# Patient Record
Sex: MALE | Race: OTHER | HISPANIC OR LATINO | Employment: FULL TIME | URBAN - METROPOLITAN AREA
[De-identification: names, ages, dates, MRNs, and addresses within clinical notes are randomized per-mention and may not be internally consistent; named-entity substitution may affect disease eponyms.]

---

## 2017-04-02 ENCOUNTER — HOSPITAL ENCOUNTER (EMERGENCY)
Facility: HOSPITAL | Age: 22
Discharge: HOME | End: 2017-04-02
Admitting: EMERGENCY MEDICINE
Payer: COMMERCIAL

## 2017-04-02 VITALS
DIASTOLIC BLOOD PRESSURE: 68 MMHG | WEIGHT: 227.29 LBS | RESPIRATION RATE: 16 BRPM | HEART RATE: 84 BPM | SYSTOLIC BLOOD PRESSURE: 149 MMHG | OXYGEN SATURATION: 98 % | TEMPERATURE: 98.1 F

## 2017-04-02 DIAGNOSIS — H62.40 OTOMYCOSIS: ICD-10-CM

## 2017-04-02 DIAGNOSIS — H72.90: Primary | ICD-10-CM

## 2017-04-02 DIAGNOSIS — B36.9 OTOMYCOSIS: ICD-10-CM

## 2017-04-02 PROCEDURE — 99282 EMERGENCY DEPT VISIT SF MDM: CPT

## 2017-04-02 RX ORDER — ACETIC ACID 20.65 MG/ML
4 SOLUTION AURICULAR (OTIC) 3 TIMES DAILY
Qty: 50 ML | Refills: 0 | Status: SHIPPED | OUTPATIENT
Start: 2017-04-02 | End: 2018-01-16

## 2017-08-22 ENCOUNTER — HOSPITAL ENCOUNTER (EMERGENCY)
Facility: HOSPITAL | Age: 22
Discharge: HOME | End: 2017-08-22
Admitting: EMERGENCY MEDICINE
Payer: MEDICAID

## 2017-08-22 VITALS
RESPIRATION RATE: 15 BRPM | OXYGEN SATURATION: 98 % | DIASTOLIC BLOOD PRESSURE: 73 MMHG | TEMPERATURE: 98.2 F | SYSTOLIC BLOOD PRESSURE: 170 MMHG | HEART RATE: 85 BPM | WEIGHT: 234.13 LBS

## 2017-08-22 DIAGNOSIS — H66.93 BILATERAL OTITIS MEDIA: Primary | ICD-10-CM

## 2017-08-22 PROCEDURE — 99282 EMERGENCY DEPT VISIT SF MDM: CPT

## 2017-08-22 RX ORDER — OFLOXACIN 3 MG/ML
10 SOLUTION AURICULAR (OTIC) DAILY
Qty: 5 ML | Refills: 0 | Status: SHIPPED | OUTPATIENT
Start: 2017-08-22 | End: 2017-08-29

## 2018-01-16 ENCOUNTER — APPOINTMENT (EMERGENCY)
Dept: CT IMAGING | Facility: HOSPITAL | Age: 23
End: 2018-01-16
Payer: COMMERCIAL

## 2018-01-16 ENCOUNTER — HOSPITAL ENCOUNTER (EMERGENCY)
Facility: HOSPITAL | Age: 23
Discharge: HOME | End: 2018-01-16
Payer: COMMERCIAL

## 2018-01-16 VITALS
SYSTOLIC BLOOD PRESSURE: 130 MMHG | RESPIRATION RATE: 16 BRPM | HEART RATE: 76 BPM | TEMPERATURE: 97.8 F | OXYGEN SATURATION: 100 % | DIASTOLIC BLOOD PRESSURE: 73 MMHG | WEIGHT: 230 LBS

## 2018-01-16 DIAGNOSIS — R11.0 NAUSEA: ICD-10-CM

## 2018-01-16 DIAGNOSIS — R10.13 EPIGASTRIC ABDOMINAL PAIN: ICD-10-CM

## 2018-01-16 DIAGNOSIS — M54.50 LOW BACK PAIN: Primary | ICD-10-CM

## 2018-01-16 LAB
ALBUMIN SERPL BCP-MCNC: 4.2 G/DL (ref 3.5–5)
ALP SERPL-CCNC: 57 U/L (ref 46–116)
ALT SERPL W P-5'-P-CCNC: 32 U/L (ref 12–78)
ANION GAP SERPL CALCULATED.3IONS-SCNC: 6 MMOL/L (ref 4–13)
AST SERPL W P-5'-P-CCNC: 22 U/L (ref 5–45)
BASOPHILS # BLD AUTO: 0.02 THOUSANDS/ΜL (ref 0–0.1)
BASOPHILS NFR BLD AUTO: 0 % (ref 0–1)
BILIRUB SERPL-MCNC: 0.49 MG/DL (ref 0.2–1)
BILIRUB UR QL STRIP: NEGATIVE
BUN SERPL-MCNC: 17 MG/DL (ref 5–25)
CALCIUM SERPL-MCNC: 9.5 MG/DL (ref 8.3–10.1)
CHLORIDE SERPL-SCNC: 102 MMOL/L (ref 100–108)
CLARITY UR: CLEAR
CO2 SERPL-SCNC: 31 MMOL/L (ref 21–32)
COLOR UR: YELLOW
COLOR, POC: YELLOW
CREAT SERPL-MCNC: 1 MG/DL (ref 0.6–1.3)
EOSINOPHIL # BLD AUTO: 0.38 THOUSAND/ΜL (ref 0–0.61)
EOSINOPHIL NFR BLD AUTO: 5 % (ref 0–6)
ERYTHROCYTE [DISTWIDTH] IN BLOOD BY AUTOMATED COUNT: 13 % (ref 11.6–15.1)
GFR SERPL CREATININE-BSD FRML MDRD: 107 ML/MIN/1.73SQ M
GLUCOSE SERPL-MCNC: 94 MG/DL (ref 65–140)
GLUCOSE UR STRIP-MCNC: NEGATIVE MG/DL
HCT VFR BLD AUTO: 52.1 % (ref 36.5–49.3)
HGB BLD-MCNC: 17 G/DL (ref 12–17)
HGB UR QL STRIP.AUTO: NEGATIVE
KETONES UR STRIP-MCNC: NEGATIVE MG/DL
LEUKOCYTE ESTERASE UR QL STRIP: NEGATIVE
LIPASE SERPL-CCNC: 199 U/L (ref 73–393)
LYMPHOCYTES # BLD AUTO: 2.2 THOUSANDS/ΜL (ref 0.6–4.47)
LYMPHOCYTES NFR BLD AUTO: 31 % (ref 14–44)
MCH RBC QN AUTO: 31.3 PG (ref 26.8–34.3)
MCHC RBC AUTO-ENTMCNC: 32.6 G/DL (ref 31.4–37.4)
MCV RBC AUTO: 96 FL (ref 82–98)
MONOCYTES # BLD AUTO: 0.37 THOUSAND/ΜL (ref 0.17–1.22)
MONOCYTES NFR BLD AUTO: 5 % (ref 4–12)
NEUTROPHILS # BLD AUTO: 4.08 THOUSANDS/ΜL (ref 1.85–7.62)
NEUTS SEG NFR BLD AUTO: 59 % (ref 43–75)
NITRITE UR QL STRIP: NEGATIVE
NRBC BLD AUTO-RTO: 0 /100 WBCS
PH UR STRIP.AUTO: 7 [PH] (ref 4.5–8)
PLATELET # BLD AUTO: 211 THOUSANDS/UL (ref 149–390)
PMV BLD AUTO: 11.6 FL (ref 8.9–12.7)
POTASSIUM SERPL-SCNC: 4.2 MMOL/L (ref 3.5–5.3)
PROT SERPL-MCNC: 8.3 G/DL (ref 6.4–8.2)
PROT UR STRIP-MCNC: NEGATIVE MG/DL
RBC # BLD AUTO: 5.44 MILLION/UL (ref 3.88–5.62)
SODIUM SERPL-SCNC: 139 MMOL/L (ref 136–145)
SP GR UR STRIP.AUTO: 1.02 (ref 1–1.03)
UROBILINOGEN UR QL STRIP.AUTO: 0.2 E.U./DL
WBC # BLD AUTO: 7.05 THOUSAND/UL (ref 4.31–10.16)

## 2018-01-16 PROCEDURE — 80053 COMPREHEN METABOLIC PANEL: CPT

## 2018-01-16 PROCEDURE — 74177 CT ABD & PELVIS W/CONTRAST: CPT

## 2018-01-16 PROCEDURE — 83690 ASSAY OF LIPASE: CPT

## 2018-01-16 PROCEDURE — 85025 COMPLETE CBC W/AUTO DIFF WBC: CPT

## 2018-01-16 PROCEDURE — 81002 URINALYSIS NONAUTO W/O SCOPE: CPT

## 2018-01-16 PROCEDURE — 87491 CHLMYD TRACH DNA AMP PROBE: CPT | Performed by: PHYSICIAN ASSISTANT

## 2018-01-16 PROCEDURE — 96374 THER/PROPH/DIAG INJ IV PUSH: CPT

## 2018-01-16 PROCEDURE — 81003 URINALYSIS AUTO W/O SCOPE: CPT

## 2018-01-16 PROCEDURE — 87591 N.GONORRHOEAE DNA AMP PROB: CPT | Performed by: PHYSICIAN ASSISTANT

## 2018-01-16 PROCEDURE — 99284 EMERGENCY DEPT VISIT MOD MDM: CPT

## 2018-01-16 PROCEDURE — 36415 COLL VENOUS BLD VENIPUNCTURE: CPT

## 2018-01-16 RX ORDER — ONDANSETRON 4 MG/1
4 TABLET, ORALLY DISINTEGRATING ORAL EVERY 8 HOURS PRN
Qty: 12 TABLET | Refills: 0 | Status: SHIPPED | OUTPATIENT
Start: 2018-01-16 | End: 2019-03-05 | Stop reason: ALTCHOICE

## 2018-01-16 RX ORDER — CYCLOBENZAPRINE HCL 5 MG
5 TABLET ORAL 3 TIMES DAILY PRN
Qty: 10 TABLET | Refills: 0 | Status: SHIPPED | OUTPATIENT
Start: 2018-01-16 | End: 2019-03-05 | Stop reason: ALTCHOICE

## 2018-01-16 RX ORDER — DIAZEPAM 5 MG/ML
5 INJECTION, SOLUTION INTRAMUSCULAR; INTRAVENOUS ONCE
Status: COMPLETED | OUTPATIENT
Start: 2018-01-16 | End: 2018-01-16

## 2018-01-16 RX ADMIN — IOHEXOL 100 ML: 350 INJECTION, SOLUTION INTRAVENOUS at 17:54

## 2018-01-16 RX ADMIN — DIAZEPAM 5 MG: 5 INJECTION, SOLUTION INTRAMUSCULAR; INTRAVENOUS at 17:15

## 2018-01-16 NOTE — DISCHARGE INSTRUCTIONS
Acute Nausea and Vomiting, Ambulatory Care   GENERAL INFORMATION:   Acute nausea and vomiting  starts suddenly, gets worse quickly, and lasts a short time  Nausea and vomiting may be caused by pregnancy, alcohol, infection, or medicines  Common related symptoms include the following:   · Fever    · Abdominal swelling    · Pain, tenderness, or a lump in the abdomen    · Splashing sounds heard in your stomach when you move  Seek immediate care for the following symptoms:   · Blood in your vomit or bowel movements    · Sudden, severe pain in your chest and upper abdomen after hard vomiting    · Dizziness, dry mouth, and thirst    · Urinating very little or not at all    · Muscle weakness, leg cramps, and trouble breathing    · A heart beat that is faster than normal    · Vomiting for more than 48 hours  Treatment for acute nausea and vomiting  may include medicines to calm your stomach and stop the vomiting  You may need IV fluids if you are dehydrated  Manage your nausea and vomiting:   · Drink liquids as directed to prevent dehydration  Ask how much liquid to drink each day and which liquids are best for you  You may need to drink an oral rehydration solution (ORS)  ORS contains water, salts, and sugar that are needed to replace the lost body fluids  Ask what kind of ORS to use, how much to drink, and where to get it  · Eat smaller meals, more often  Eat small amounts of food every 2 to 3 hours, even if you are not hungry  Food in your stomach may help decrease your nausea  · Avoid stress  Find ways to relax and manage your stress  Headaches due to stress may cause nausea and vomiting  Get more rest and sleep  Follow up with your healthcare provider as directed:  Write down your questions so you remember to ask them during your visits  CARE AGREEMENT:   You have the right to help plan your care  Learn about your health condition and how it may be treated   Discuss treatment options with your caregivers to decide what care you want to receive  You always have the right to refuse treatment  The above information is an  only  It is not intended as medical advice for individual conditions or treatments  Talk to your doctor, nurse or pharmacist before following any medical regimen to see if it is safe and effective for you  © 2014 [address] is for End User's use only and may not be sold, redistributed or otherwise used for commercial purposes  All illustrations and images included in CareNotes® are the copyrighted property of [employer]  or Godwin Guzmanuss  Abdominal Pain   WHAT YOU NEED TO KNOW:   Abdominal pain can be dull, achy, or sharp  You may have pain in one area of your abdomen, or in your entire abdomen  Your pain may be caused by a condition such as constipation, food sensitivity or poisoning, infection, or a blockage  Abdominal pain can also be from a hernia, appendicitis, or an ulcer  Liver, gallbladder, or kidney conditions can also cause abdominal pain  The cause of your abdominal pain may be unknown  DISCHARGE INSTRUCTIONS:   Return to the emergency department if:   · You have new chest pain or shortness of breath  · You have pulsing pain in your upper abdomen or lower back that suddenly becomes constant  · Your pain is in the right lower abdominal area and worsens with movement  · You have a fever over 100 4°F (38°C) or shaking chills  · You are vomiting and cannot keep food or liquids down  · Your pain does not improve or gets worse over the next 8 to 12 hours  · You see blood in your vomit or bowel movements, or they look black and tarry  · Your skin or the whites of your eyes turn yellow  · You are a woman and have a large amount of vaginal bleeding that is not your monthly period  Contact your healthcare provider if:   · You have pain in your lower back       · You are a man and have pain in your testicles  · You have pain when you urinate  · You have questions or concerns about your condition or care  Follow up with your healthcare provider within 24 hours or as directed:  Write down your questions so you remember to ask them during your visits  Medicines:   · Medicines  may be given to calm your stomach and prevent vomiting or to decrease pain  Ask how to take pain medicine safely  · Take your medicine as directed  Contact your healthcare provider if you think your medicine is not helping or if you have side effects  Tell him of her if you are allergic to any medicine  Keep a list of the medicines, vitamins, and herbs you take  Include the amounts, and when and why you take them  Bring the list or the pill bottles to follow-up visits  Carry your medicine list with you in case of an emergency  © 2017 [address] Information is for End User's use only and may not be sold, redistributed or otherwise used for commercial purposes  All illustrations and images included in CareNotes® are the copyrighted property of [employer] D A M [employer] Inc  or Godwin Lopez  The above information is an  only  It is not intended as medical advice for individual conditions or treatments  Talk to your doctor, nurse or pharmacist before following any medical regimen to see if it is safe and effective for you  Acute Low Back Pain   WHAT YOU NEED TO KNOW:   Acute low back pain is sudden discomfort in your lower back area that lasts for up to 6 weeks  The discomfort makes it difficult to tolerate activity  DISCHARGE INSTRUCTIONS:   Return to the emergency department if:   · You have severe pain  · You have sudden stiffness and heaviness on both buttocks down to both legs  · You have numbness or weakness in one leg, or pain in both legs  · You have numbness in your genital area or across your lower back  · You cannot control your urine or bowel movements    Contact your healthcare provider if:   · You have a fever  · You have pain at night or when you rest     · Your pain does not get better with treatment  · You have pain that worsens when you cough or sneeze  · You suddenly feel something pop or snap in your back  · You have questions or concerns about your condition or care  Medicines: The following medicines may be ordered by your healthcare provider:  · Acetaminophen  decreases pain  It is available without a doctor's order  Ask how much to take and how often to take it  Follow directions  Acetaminophen can cause liver damage if not taken correctly  · NSAIDs  help decrease swelling and pain  This medicine is available with or without a doctor's order  NSAIDs can cause stomach bleeding or kidney problems in certain people  If you take blood thinner medicine, always ask your healthcare provider if NSAIDs are safe for you  Always read the medicine label and follow directions  · Prescription pain medicine  may be given  Ask your healthcare provider how to take this medicine safely  · Muscle relaxers  decrease pain by relaxing the muscles in your lower spine  · Take your medicine as directed  Contact your healthcare provider if you think your medicine is not helping or if you have side effects  Tell him of her if you are allergic to any medicine  Keep a list of the medicines, vitamins, and herbs you take  Include the amounts, and when and why you take them  Bring the list or the pill bottles to follow-up visits  Carry your medicine list with you in case of an emergency  Self-care:   · Stay active  as much as you can without causing more pain  Bed rest could make your back pain worse  Start with some light exercises such as walking  Avoid heavy lifting until your pain is gone  Ask for more information about the activities or exercises that are right for you  · Ice  helps decrease swelling, pain, and muscle spams  Put crushed ice in a plastic bag  Cover it with a towel  Place it on your lower back for 20 to 30 minutes every 2 hours  Do this for about 2 to 3 days after your pain starts, or as directed  · Heat  helps decrease pain and muscle spasms  Start to use heat after treatment with ice has stopped  Use a small towel dampened with warm water or a heating pad, or sit in a warm bath  Apply heat on the area for 20 to 30 minutes every 2 hours for as many days as directed  Alternate heat and ice  Prevent acute low back pain:   · Use proper body mechanics  ¨ Bend at the hips and knees when you  objects  Do not bend from the waist  Use your leg muscles as you lift the load  Do not use your back  Keep the object close to your chest as you lift it  Try not to twist or lift anything above your waist     ¨ Change your position often when you stand for long periods of time  Rest one foot on a small box or footrest, and then switch to the other foot often  ¨ Try not to sit for long periods of time  When you do, sit in a straight-backed chair with your feet flat on the floor  Never reach, pull, or push while you are sitting  · Do exercises that strengthen your back muscles  Warm up before you exercise  Ask your healthcare provider the best exercises for you  · Maintain a healthy weight  Ask your healthcare provider how much you should weigh  Ask him to help you create a weight loss plan if you are overweight  Follow up with your healthcare provider as directed:  Return for a follow-up visit if you still have pain after 1 to 3 weeks of treatment  You may need to visit an orthopedist if your back pain lasts more than 12 weeks  Write down your questions so you remember to ask them during your visits  © 2017 [facility] Information is for End User's use only and may not be sold, redistributed or otherwise used for commercial purposes   All illustrations and images included in CareNotes® are the copyrighted property of FREDDY FAROOQ Andrea  or Godwin Lopez  The above information is an  only  It is not intended as medical advice for individual conditions or treatments  Talk to your doctor, nurse or pharmacist before following any medical regimen to see if it is safe and effective for you  DISCHARGE INSTRUCTIONS:    FOLLOW UP WITH YOUR PRIMARY CARE PROVIDER OR THE [address] [place] [address]  MAKE AN APPOINTMENT TO BE SEEN  TAKE TYLENOL FOR PAIN  TAKE FLEXERIL AS PRESCRIBED FOR MUSCLE STRAIN  IF RASH, SHORTNESS OF BREATH OR TROUBLE SWALLOWING, STOP TAKING THE MEDICATION AND BE SEEN  NO DRINKING, DRIVING OR OPERATING HEAVY MACHINERY WHILE TAKING THIS MEDICATION  TAKE ZOFRAN FOR NAUSEA  IF RASH, SHORTNESS OF BREATH OR TROUBLE SWALLOWING, STOP TAKING THE MEDICATION AND BE SEEN  REST AND DRINK PLENTY OF FLUIDS  FOLLOW UP WITH THE RECOMMENDED GASTROENTEROLOGIST  FOLLOW A BLAND DIET  APPLY ICE AND HEAT TO BACK WHERE HAVING PAIN  IF SYMPTOMS WORSEN OR NEW SYMPTOMS ARISE, RETURN TO THE ER TO BE SEEN

## 2018-01-16 NOTE — ED PROVIDER NOTES
History  Chief Complaint   Patient presents with    Abdominal Pain     abdomianl pain x 2 weeks with nausea  Pt deneis fevers/vomiting  Pt states when he gets hungry hisstomach hurts more  Pt also c/o burnign with urination amdb/l lower back pain      22y  o male with no significant PMH presents to the ER for left low back pain and epigastric abdominal pain for 1-2 weeks  Patient denies taking any medication for his symptoms  He describes his pain as sharp and non-radiating  He rates his pain 8/10 and states his abdominal pain comes and goes when he is hungry and his back pain comes and goes with movement  Associated symptoms: diarrhea, nausea and dysuria  He denies injury to the area or heavy lifting  He denies fever, chills, chest pain, dyspnea, vomiting, hematuria, frequency, urgency, bowel/bladder incontinence, weakness or paresthesias  History provided by:  Patient   used: No        None       History reviewed  No pertinent past medical history  History reviewed  No pertinent surgical history  History reviewed  No pertinent family history  I have reviewed and agree with the history as documented  Social History   Substance Use Topics    Smoking status: Never Smoker    Smokeless tobacco: Never Used    Alcohol use Yes      Comment: occasionally        Review of Systems   Constitutional: Negative for chills and fever  Eyes: Negative for photophobia and visual disturbance  Respiratory: Negative for shortness of breath  Cardiovascular: Negative for chest pain  Gastrointestinal: Positive for abdominal pain, diarrhea and nausea  Negative for vomiting  Genitourinary: Positive for dysuria  Negative for frequency, hematuria and urgency  Musculoskeletal: Positive for back pain  Negative for neck pain and neck stiffness  Skin: Negative for rash  Allergic/Immunologic: Negative for food allergies  Neurological: Negative for weakness, numbness and headaches  Physical Exam  ED Triage Vitals [01/16/18 1331]   Temperature Pulse Respirations Blood Pressure SpO2   99 °F (37 2 °C) 83 16 141/62 100 %      Temp Source Heart Rate Source Patient Position - Orthostatic VS BP Location FiO2 (%)   Oral Monitor Sitting Right arm --      Pain Score       5           Orthostatic Vital Signs  Vitals:    01/16/18 1331 01/16/18 1642 01/16/18 1814   BP: 141/62 142/63 130/73   Pulse: 83 77 76   Patient Position - Orthostatic VS: Sitting  Sitting       Physical Exam   Constitutional:  Non-toxic appearance  No distress  HENT:   Head: Normocephalic and atraumatic  Right Ear: Tympanic membrane, external ear and ear canal normal  No drainage, swelling or tenderness  No foreign bodies  Tympanic membrane is not erythematous  No hemotympanum  Left Ear: Tympanic membrane, external ear and ear canal normal  No drainage, swelling or tenderness  No foreign bodies  Tympanic membrane is not erythematous  No hemotympanum  Nose: Nose normal    Mouth/Throat: Uvula is midline, oropharynx is clear and moist and mucous membranes are normal  No uvula swelling  No posterior oropharyngeal edema, posterior oropharyngeal erythema or tonsillar abscesses  No tonsillar exudate  Eyes: Conjunctivae and EOM are normal  Pupils are equal, round, and reactive to light  Neck: Normal range of motion and phonation normal  Neck supple  No tracheal deviation present  Cardiovascular: Normal rate, regular rhythm, S1 normal, S2 normal and normal heart sounds  Exam reveals no gallop and no friction rub  No murmur heard  Pulmonary/Chest: Effort normal and breath sounds normal  No respiratory distress  He has no decreased breath sounds  He has no wheezes  He has no rhonchi  He has no rales  He exhibits no tenderness  Abdominal: Soft  Bowel sounds are normal  He exhibits no distension  There is tenderness in the epigastric area  There is no rebound and no guarding  Musculoskeletal: Normal range of motion   He exhibits no edema or deformity  Cervical back: Normal         Thoracic back: Normal         Lumbar back: He exhibits tenderness (mild) and pain  He exhibits normal range of motion, no bony tenderness, no swelling, no edema and no deformity  Back:    Neurological: He is alert  He has normal strength  No cranial nerve deficit or sensory deficit  He exhibits normal muscle tone  Gait normal  GCS eye subscore is 4  GCS verbal subscore is 5  GCS motor subscore is 6  Skin: Skin is warm and dry  No rash noted  Psychiatric: He has a normal mood and affect  Nursing note and vitals reviewed  ED Medications  Medications   diazepam (VALIUM) injection 5 mg (5 mg Intravenous Given 1/16/18 1715)   iohexol (OMNIPAQUE) 350 MG/ML injection (MULTI-DOSE) 100 mL (100 mL Intravenous Given 1/16/18 1754)       Diagnostic Studies  Results Reviewed     Procedure Component Value Units Date/Time    Chlamydia/GC amplified DNA by PCR [[id number]] Collected:  01/16/18 1719    Lab Status: In process Specimen:  Urine from Urine, Other Updated:  01/16/18 1722    Comprehensive metabolic panel [[id number]]  (Abnormal) Collected:  01/16/18 1527    Lab Status:  Final result Specimen:  Blood from Arm, Right Updated:  01/16/18 1608     Sodium 139 mmol/L      Potassium 4 2 mmol/L      Chloride 102 mmol/L      CO2 31 mmol/L      Anion Gap 6 mmol/L      BUN 17 mg/dL      Creatinine 1 00 mg/dL      Glucose 94 mg/dL      Calcium 9 5 mg/dL      AST 22 U/L      ALT 32 U/L      Alkaline Phosphatase 57 U/L      Total Protein 8 3 (H) g/dL      Albumin 4 2 g/dL      Total Bilirubin 0 49 mg/dL      eGFR 107 ml/min/1 73sq m     Narrative:         National Kidney Disease Education Program recommendations are as follows:  GFR calculation is accurate only with a steady state creatinine  Chronic Kidney disease less than 60 ml/min/1 73 sq  meters  Kidney failure less than 15 ml/min/1 73 sq  meters      Lipase [[id number]]  (Normal) Collected:  01/16/18 1527    Lab Status:  Final result Specimen:  Blood from Arm, Right Updated:  01/16/18 1608     Lipase 199 u/L     CBC and differential [[id number]]  (Abnormal) Collected:  01/16/18 1527    Lab Status:  Final result Specimen:  Blood from Arm, Right Updated:  01/16/18 1538     WBC 7 05 Thousand/uL      RBC 5 44 Million/uL      Hemoglobin 17 0 g/dL      Hematocrit 52 1 (H) %      MCV 96 fL      MCH 31 3 pg      MCHC 32 6 g/dL      RDW 13 0 %      MPV 11 6 fL      Platelets 189 Thousands/uL      nRBC 0 /100 WBCs      Neutrophils Relative 59 %      Lymphocytes Relative 31 %      Monocytes Relative 5 %      Eosinophils Relative 5 %      Basophils Relative 0 %      Neutrophils Absolute 4 08 Thousands/µL      Lymphocytes Absolute 2 20 Thousands/µL      Monocytes Absolute 0 37 Thousand/µL      Eosinophils Absolute 0 38 Thousand/µL      Basophils Absolute 0 02 Thousands/µL     POCT urinalysis dipstick [[id number]]  (Normal) Resulted:  01/16/18 1536    Lab Status:  Final result Specimen:  Urine from Urine, Other Updated:  01/16/18 1536     Color, UA yellow    ED Urine Macroscopic [[id number]]  (Normal) Collected:  01/16/18 1533    Lab Status:  Final result Specimen:  Urine Updated:  01/16/18 1534     Color, UA Yellow     Clarity, UA Clear     pH, UA 7 0     Leukocytes, UA Negative     Nitrite, UA Negative     Protein, UA Negative mg/dl      Glucose, UA Negative mg/dl      Ketones, UA Negative mg/dl      Urobilinogen, UA 0 2 E U /dl      Bilirubin, UA Negative     Blood, UA Negative     Specific Gravity, UA 1 020    Narrative:       [facility] RESULT                 CT abdomen pelvis with contrast   Final Result by John France MD (01/16 1807)   Nonacute with a normal appendix identified           Workstation performed: [id number]                    Procedures  Procedures       Phone Contacts  ED Phone Contact    ED Course  ED Course                                MDM  Number of Diagnoses or Management Options  Epigastric abdominal pain: new and requires workup  Low back pain: new and requires workup  Nausea: new and requires workup  Diagnosis management comments: DDX consists of but not limited to: gastroenteritis, pancreatitis, ulcer, perforation, strain, UTI, kidney stone    Nursing staff ordered CVC, CMP, lipase and urine prior to me seeing the patient  Will CT abdomen and check GC/chlamydia  Will give Valium for back  At discharge, I instructed the patient to:  -follow up with pcp  -take Tylenol for pain  -take Flexeril as prescribed for muscle strain  -take Zofran as prescribed for nausea and vomiting  -rest and drink plenty of fluids  -follow up with the recommended GI specialist for epigastric pain  -follow a bland diet  -apply ice and heat to back  -return to the ER if symptoms worsened or new symptoms arose  Patient agreed to this plan and was stable at time of discharge  Amount and/or Complexity of Data Reviewed  Clinical lab tests: ordered and reviewed  Tests in the radiology section of CPT®: ordered and reviewed    Patient Progress  Patient progress: stable    CritCare Time    Disposition  Final diagnoses:   Low back pain   Epigastric abdominal pain   Nausea     Time reflects when diagnosis was documented in both MDM as applicable and the Disposition within this note     Time User Action Codes Description Comment    1/16/2018  6:22 PM Mateuszkian Sutton Add [M54 5] Low back pain     1/16/2018  6:22 PM Starr Cook Islander A Add [R10 13] Epigastric abdominal pain     1/16/2018  6:22 PM Starr Cook Islander A Add [R11 0] Nausea       ED Disposition     ED Disposition Condition Comment    Discharge  Fernanda Tinoco discharge to home/self care      Condition at discharge: Stable        Follow-up Information     Follow up With Specialties Details Why Donavan Morrell MD Pediatric Emergency Medicine, Pediatrics Schedule an appointment as soon as possible for a visit in 1 day  [facility] [address] [facility] Gastroenterology Schedule an appointment as soon as possible for a visit in 1 day  [place] [id number] [phone]        Discharge Medication List as of 1/16/2018  6:25 PM      START taking these medications    Details   cyclobenzaprine (FLEXERIL) 5 mg tablet Take 1 tablet by mouth 3 (three) times a day as needed for muscle spasms, Starting Tue 1/16/2018, Print      ondansetron (ZOFRAN-ODT) 4 mg disintegrating tablet Take 1 tablet by mouth every 8 (eight) hours as needed for nausea or vomiting, Starting Tue 1/16/2018, Print           No discharge procedures on file      ED Provider  Electronically Signed by           Mario Gutierrez PA-C  01/16/18 2100

## 2018-01-17 LAB
CHLAMYDIA DNA CVX QL NAA+PROBE: NORMAL
N GONORRHOEA DNA GENITAL QL NAA+PROBE: NORMAL

## 2019-03-05 ENCOUNTER — OFFICE VISIT (OUTPATIENT)
Dept: FAMILY MEDICINE CLINIC | Facility: CLINIC | Age: 24
End: 2019-03-05
Payer: COMMERCIAL

## 2019-03-05 VITALS
DIASTOLIC BLOOD PRESSURE: 70 MMHG | SYSTOLIC BLOOD PRESSURE: 110 MMHG | WEIGHT: 218 LBS | HEIGHT: 72 IN | HEART RATE: 82 BPM | TEMPERATURE: 98 F | RESPIRATION RATE: 16 BRPM | BODY MASS INDEX: 29.53 KG/M2 | OXYGEN SATURATION: 97 %

## 2019-03-05 DIAGNOSIS — K58.9 IRRITABLE BOWEL SYNDROME, UNSPECIFIED TYPE: Primary | ICD-10-CM

## 2019-03-05 PROCEDURE — 99203 OFFICE O/P NEW LOW 30 MIN: CPT | Performed by: FAMILY MEDICINE

## 2019-03-05 PROCEDURE — 3008F BODY MASS INDEX DOCD: CPT | Performed by: FAMILY MEDICINE

## 2019-03-12 PROBLEM — K58.9 IRRITABLE BOWEL SYNDROME: Status: ACTIVE | Noted: 2019-03-12

## 2019-03-12 NOTE — ASSESSMENT & PLAN NOTE
He does not have any active disease  Reassurance  Watch diet, increase fiber  avoid excessive fat and carbs  I explained to patient that in patients who have symptoms of irritable bowel syndrome (IBS), the differential diagnosis includes celiac sprue, microscopic and collagenous colitis, atypical Crohns disease for patients with diarrhea-predominant IBS, and chronic constipation (without pain) for those with constipation-predominant IBS  If there are no warning signs, laboratory testing is warranted only if indicated by the history  The current symptoms re benign and are been treated in the needed basis

## 2019-03-12 NOTE — PROGRESS NOTES
Assessment/Plan:    Irritable bowel syndrome  He does not have any active disease  Reassurance  Watch diet, increase fiber  avoid excessive fat and carbs  I explained to patient that in patients who have symptoms of irritable bowel syndrome (IBS), the differential diagnosis includes celiac sprue, microscopic and collagenous colitis, atypical Crohns disease for patients with diarrhea-predominant IBS, and chronic constipation (without pain) for those with constipation-predominant IBS  If there are no warning signs, laboratory testing is warranted only if indicated by the history  The current symptoms re benign and are been treated in the needed basis  Diagnoses and all orders for this visit:    Irritable bowel syndrome, unspecified type          Subjective:      Patient ID: Gerry Serrano is a 26 y o  male  This is a chronic problem  The current episode started more than 1 year ago  The onset quality is sudden  The problem occurs intermittently  The problem has been waxing and waning  The pain is located in the generalized abdominal region  The pain is at a severity of 5/10  The pain is moderate  The quality of the pain is colicky  Pertinent negatives include no arthralgias, dysuria, fever or headaches  Exacerbated by: stress  The pain is relieved by nothing  He has tried nothing for the symptoms  Prior diagnostic workup includes upper endoscopy  There is no history of abdominal surgery, colon cancer, Crohn's disease, gallstones, GERD, irritable bowel syndrome, pancreatitis, PUD or ulcerative colitis  The following portions of the patient's history were reviewed and updated as appropriate: allergies, current medications, past family history, past medical history, past social history, past surgical history and problem list     Review of Systems   Constitutional: Negative for activity change, appetite change, fatigue and fever     HENT: Negative for congestion, dental problem, ear pain, sinus pain and trouble swallowing  Eyes: Negative for discharge, redness and itching  Respiratory: Negative for cough, shortness of breath and wheezing  Cardiovascular: Negative for chest pain  Gastrointestinal: Positive for abdominal pain  Negative for abdominal distention  Genitourinary: Negative for difficulty urinating, dysuria and enuresis  Musculoskeletal: Negative for arthralgias, back pain and gait problem  Neurological: Negative for dizziness and headaches  Hematological: Negative for adenopathy  Does not bruise/bleed easily  Psychiatric/Behavioral: Negative for behavioral problems  Objective:      /70 (BP Location: Left arm, Patient Position: Sitting, Cuff Size: Standard)   Pulse 82   Temp 98 °F (36 7 °C) (Oral)   Resp 16   Ht 6' (1 829 m)   Wt 98 9 kg (218 lb)   SpO2 97%   BMI 29 57 kg/m²          Physical Exam   HENT:   Head: Normocephalic  Eyes: Pupils are equal, round, and reactive to light  EOM are normal    Neck: Neck supple  Cardiovascular: Normal rate and regular rhythm  Pulmonary/Chest: Effort normal    Abdominal: Soft  Musculoskeletal: Normal range of motion  Neurological: He is alert  Skin: Skin is warm and dry  Psychiatric: He has a normal mood and affect   His behavior is normal

## 2019-05-07 ENCOUNTER — OFFICE VISIT (OUTPATIENT)
Dept: FAMILY MEDICINE CLINIC | Facility: CLINIC | Age: 24
End: 2019-05-07
Payer: COMMERCIAL

## 2019-05-07 VITALS
HEIGHT: 72 IN | HEART RATE: 78 BPM | OXYGEN SATURATION: 98 % | TEMPERATURE: 98 F | DIASTOLIC BLOOD PRESSURE: 70 MMHG | BODY MASS INDEX: 31.15 KG/M2 | SYSTOLIC BLOOD PRESSURE: 120 MMHG | RESPIRATION RATE: 16 BRPM | WEIGHT: 230 LBS

## 2019-05-07 DIAGNOSIS — M26.621 ARTHRALGIA OF RIGHT TEMPOROMANDIBULAR JOINT: ICD-10-CM

## 2019-05-07 DIAGNOSIS — Z00.01 ENCOUNTER FOR GENERAL ADULT MEDICAL EXAMINATION WITH ABNORMAL FINDINGS: Primary | ICD-10-CM

## 2019-05-07 PROCEDURE — 99395 PREV VISIT EST AGE 18-39: CPT | Performed by: FAMILY MEDICINE

## 2019-05-12 PROBLEM — M26.621 ARTHRALGIA OF RIGHT TEMPOROMANDIBULAR JOINT: Status: ACTIVE | Noted: 2019-05-12

## 2019-05-12 PROBLEM — Z00.01 ENCOUNTER FOR GENERAL ADULT MEDICAL EXAMINATION WITH ABNORMAL FINDINGS: Status: ACTIVE | Noted: 2019-05-12

## 2019-05-13 ENCOUNTER — HOSPITAL ENCOUNTER (OUTPATIENT)
Dept: RADIOLOGY | Facility: HOSPITAL | Age: 24
Discharge: HOME | End: 2019-05-13
Payer: COMMERCIAL

## 2019-05-13 ENCOUNTER — LAB (OUTPATIENT)
Dept: LAB | Facility: HOSPITAL | Age: 24
End: 2019-05-13
Payer: COMMERCIAL

## 2019-05-13 DIAGNOSIS — Z00.01 ENCOUNTER FOR GENERAL ADULT MEDICAL EXAMINATION WITH ABNORMAL FINDINGS: ICD-10-CM

## 2019-05-13 DIAGNOSIS — M26.621 ARTHRALGIA OF RIGHT TEMPOROMANDIBULAR JOINT: ICD-10-CM

## 2019-05-13 LAB
ALBUMIN SERPL BCP-MCNC: 4.4 G/DL (ref 3–5.2)
ALP SERPL-CCNC: 42 U/L (ref 43–122)
ALT SERPL W P-5'-P-CCNC: 55 U/L (ref 9–52)
ANION GAP SERPL CALCULATED.3IONS-SCNC: 6 MMOL/L (ref 5–14)
AST SERPL W P-5'-P-CCNC: 40 U/L (ref 17–59)
BASOPHILS # BLD AUTO: 0 THOUSANDS/ΜL (ref 0–0.1)
BASOPHILS NFR BLD AUTO: 1 % (ref 0–1)
BILIRUB SERPL-MCNC: 0.7 MG/DL
BUN SERPL-MCNC: 18 MG/DL (ref 5–25)
CALCIUM SERPL-MCNC: 9.9 MG/DL (ref 8.4–10.2)
CHLORIDE SERPL-SCNC: 99 MMOL/L (ref 97–108)
CHOLEST SERPL-MCNC: 209 MG/DL
CO2 SERPL-SCNC: 33 MMOL/L (ref 22–30)
CREAT SERPL-MCNC: 1.04 MG/DL (ref 0.7–1.5)
EOSINOPHIL # BLD AUTO: 0.4 THOUSAND/ΜL (ref 0–0.4)
EOSINOPHIL NFR BLD AUTO: 8 % (ref 0–6)
ERYTHROCYTE [DISTWIDTH] IN BLOOD BY AUTOMATED COUNT: 13.4 %
EST. AVERAGE GLUCOSE BLD GHB EST-MCNC: 100 MG/DL
GFR SERPL CREATININE-BSD FRML MDRD: 101 ML/MIN/1.73SQ M
GLUCOSE P FAST SERPL-MCNC: 98 MG/DL (ref 70–99)
HBA1C MFR BLD: 5.1 % (ref 4.2–6.3)
HCT VFR BLD AUTO: 50.4 % (ref 41–53)
HDLC SERPL-MCNC: 58 MG/DL (ref 40–59)
HGB BLD-MCNC: 16.6 G/DL (ref 13.5–17.5)
LDLC SERPL CALC-MCNC: 131 MG/DL
LYMPHOCYTES # BLD AUTO: 1.8 THOUSANDS/ΜL (ref 0.5–4)
LYMPHOCYTES NFR BLD AUTO: 32 % (ref 25–45)
MCH RBC QN AUTO: 30.9 PG (ref 26–34)
MCHC RBC AUTO-ENTMCNC: 32.9 G/DL (ref 31–36)
MCV RBC AUTO: 94 FL (ref 80–100)
MONOCYTES # BLD AUTO: 0.4 THOUSAND/ΜL (ref 0.2–0.9)
MONOCYTES NFR BLD AUTO: 6 % (ref 1–10)
NEUTROPHILS # BLD AUTO: 3 THOUSANDS/ΜL (ref 1.8–7.8)
NEUTS SEG NFR BLD AUTO: 53 % (ref 45–65)
NONHDLC SERPL-MCNC: 151 MG/DL
PLATELET # BLD AUTO: 201 THOUSANDS/UL (ref 150–450)
PMV BLD AUTO: 9.3 FL (ref 8.9–12.7)
POTASSIUM SERPL-SCNC: 4.1 MMOL/L (ref 3.6–5)
PROT SERPL-MCNC: 7.7 G/DL (ref 5.9–8.4)
RBC # BLD AUTO: 5.37 MILLION/UL (ref 4.5–5.9)
SODIUM SERPL-SCNC: 138 MMOL/L (ref 137–147)
TRIGL SERPL-MCNC: 98 MG/DL
WBC # BLD AUTO: 5.7 THOUSAND/UL (ref 4.5–11)

## 2019-05-13 PROCEDURE — 80061 LIPID PANEL: CPT

## 2019-05-13 PROCEDURE — 85025 COMPLETE CBC W/AUTO DIFF WBC: CPT

## 2019-05-13 PROCEDURE — 36415 COLL VENOUS BLD VENIPUNCTURE: CPT

## 2019-05-13 PROCEDURE — 80053 COMPREHEN METABOLIC PANEL: CPT

## 2019-05-13 PROCEDURE — 83036 HEMOGLOBIN GLYCOSYLATED A1C: CPT

## 2019-05-13 PROCEDURE — 70330 X-RAY EXAM OF JAW JOINTS: CPT

## 2019-07-03 ENCOUNTER — HOSPITAL ENCOUNTER (EMERGENCY)
Facility: HOSPITAL | Age: 24
Discharge: HOME | End: 2019-07-03
Attending: EMERGENCY MEDICINE | Admitting: EMERGENCY MEDICINE
Payer: COMMERCIAL

## 2019-07-03 VITALS
BODY MASS INDEX: 31.99 KG/M2 | HEART RATE: 82 BPM | SYSTOLIC BLOOD PRESSURE: 131 MMHG | DIASTOLIC BLOOD PRESSURE: 70 MMHG | RESPIRATION RATE: 17 BRPM | TEMPERATURE: 98.1 F | OXYGEN SATURATION: 100 % | WEIGHT: 235.89 LBS

## 2019-07-03 DIAGNOSIS — E86.0 DEHYDRATION: ICD-10-CM

## 2019-07-03 DIAGNOSIS — R19.7 NAUSEA, VOMITING AND DIARRHEA: Primary | ICD-10-CM

## 2019-07-03 DIAGNOSIS — R10.9 ABDOMINAL CRAMPING: ICD-10-CM

## 2019-07-03 DIAGNOSIS — R11.2 NAUSEA, VOMITING AND DIARRHEA: Primary | ICD-10-CM

## 2019-07-03 LAB
ALBUMIN SERPL BCP-MCNC: 3.4 G/DL (ref 3.5–5)
ALP SERPL-CCNC: 51 U/L (ref 46–116)
ALT SERPL W P-5'-P-CCNC: 66 U/L (ref 12–78)
ANION GAP SERPL CALCULATED.3IONS-SCNC: 3 MMOL/L (ref 4–13)
AST SERPL W P-5'-P-CCNC: 29 U/L (ref 5–45)
BASOPHILS # BLD AUTO: 0.06 THOUSANDS/ΜL (ref 0–0.1)
BASOPHILS NFR BLD AUTO: 1 % (ref 0–1)
BILIRUB SERPL-MCNC: 0.39 MG/DL (ref 0.2–1)
BILIRUB UR QL STRIP: NEGATIVE
BUN SERPL-MCNC: 12 MG/DL (ref 5–25)
CALCIUM SERPL-MCNC: 8.8 MG/DL (ref 8.3–10.1)
CHLORIDE SERPL-SCNC: 105 MMOL/L (ref 100–108)
CLARITY UR: CLEAR
CO2 SERPL-SCNC: 33 MMOL/L (ref 21–32)
COLOR UR: YELLOW
COLOR, POC: NORMAL
CREAT SERPL-MCNC: 1.16 MG/DL (ref 0.6–1.3)
EOSINOPHIL # BLD AUTO: 2.48 THOUSAND/ΜL (ref 0–0.61)
EOSINOPHIL NFR BLD AUTO: 27 % (ref 0–6)
ERYTHROCYTE [DISTWIDTH] IN BLOOD BY AUTOMATED COUNT: 12.3 % (ref 11.6–15.1)
GFR SERPL CREATININE-BSD FRML MDRD: 89 ML/MIN/1.73SQ M
GLUCOSE SERPL-MCNC: 56 MG/DL (ref 65–140)
GLUCOSE SERPL-MCNC: 98 MG/DL (ref 65–140)
GLUCOSE UR STRIP-MCNC: NEGATIVE MG/DL
HCT VFR BLD AUTO: 47.2 % (ref 36.5–49.3)
HGB BLD-MCNC: 15.8 G/DL (ref 12–17)
HGB UR QL STRIP.AUTO: NEGATIVE
IMM GRANULOCYTES # BLD AUTO: 0.02 THOUSAND/UL (ref 0–0.2)
IMM GRANULOCYTES NFR BLD AUTO: 0 % (ref 0–2)
KETONES UR STRIP-MCNC: NEGATIVE MG/DL
LEUKOCYTE ESTERASE UR QL STRIP: NEGATIVE
LIPASE SERPL-CCNC: 192 U/L (ref 73–393)
LYMPHOCYTES # BLD AUTO: 2.58 THOUSANDS/ΜL (ref 0.6–4.47)
LYMPHOCYTES NFR BLD AUTO: 28 % (ref 14–44)
MCH RBC QN AUTO: 31.5 PG (ref 26.8–34.3)
MCHC RBC AUTO-ENTMCNC: 33.5 G/DL (ref 31.4–37.4)
MCV RBC AUTO: 94 FL (ref 82–98)
MONOCYTES # BLD AUTO: 0.47 THOUSAND/ΜL (ref 0.17–1.22)
MONOCYTES NFR BLD AUTO: 5 % (ref 4–12)
NEUTROPHILS # BLD AUTO: 3.57 THOUSANDS/ΜL (ref 1.85–7.62)
NEUTS SEG NFR BLD AUTO: 39 % (ref 43–75)
NITRITE UR QL STRIP: NEGATIVE
NRBC BLD AUTO-RTO: 0 /100 WBCS
PH UR STRIP.AUTO: 6 [PH] (ref 4.5–8)
PLATELET # BLD AUTO: 222 THOUSANDS/UL (ref 149–390)
PMV BLD AUTO: 10.7 FL (ref 8.9–12.7)
POTASSIUM SERPL-SCNC: 3.6 MMOL/L (ref 3.5–5.3)
PROT SERPL-MCNC: 7 G/DL (ref 6.4–8.2)
PROT UR STRIP-MCNC: NEGATIVE MG/DL
RBC # BLD AUTO: 5.01 MILLION/UL (ref 3.88–5.62)
SODIUM SERPL-SCNC: 141 MMOL/L (ref 136–145)
SP GR UR STRIP.AUTO: >=1.03 (ref 1–1.03)
UROBILINOGEN UR QL STRIP.AUTO: 1 E.U./DL
WBC # BLD AUTO: 9.18 THOUSAND/UL (ref 4.31–10.16)

## 2019-07-03 PROCEDURE — 96361 HYDRATE IV INFUSION ADD-ON: CPT

## 2019-07-03 PROCEDURE — 99284 EMERGENCY DEPT VISIT MOD MDM: CPT

## 2019-07-03 PROCEDURE — 85025 COMPLETE CBC W/AUTO DIFF WBC: CPT | Performed by: EMERGENCY MEDICINE

## 2019-07-03 PROCEDURE — 99284 EMERGENCY DEPT VISIT MOD MDM: CPT | Performed by: EMERGENCY MEDICINE

## 2019-07-03 PROCEDURE — 82948 REAGENT STRIP/BLOOD GLUCOSE: CPT

## 2019-07-03 PROCEDURE — 83690 ASSAY OF LIPASE: CPT | Performed by: EMERGENCY MEDICINE

## 2019-07-03 PROCEDURE — 80053 COMPREHEN METABOLIC PANEL: CPT | Performed by: EMERGENCY MEDICINE

## 2019-07-03 PROCEDURE — 96375 TX/PRO/DX INJ NEW DRUG ADDON: CPT

## 2019-07-03 PROCEDURE — 36415 COLL VENOUS BLD VENIPUNCTURE: CPT | Performed by: EMERGENCY MEDICINE

## 2019-07-03 PROCEDURE — 96374 THER/PROPH/DIAG INJ IV PUSH: CPT

## 2019-07-03 PROCEDURE — 81003 URINALYSIS AUTO W/O SCOPE: CPT

## 2019-07-03 RX ORDER — KETOROLAC TROMETHAMINE 30 MG/ML
30 INJECTION, SOLUTION INTRAMUSCULAR; INTRAVENOUS ONCE
Status: COMPLETED | OUTPATIENT
Start: 2019-07-03 | End: 2019-07-03

## 2019-07-03 RX ORDER — ONDANSETRON 4 MG/1
4 TABLET, ORALLY DISINTEGRATING ORAL EVERY 8 HOURS PRN
Qty: 20 TABLET | Refills: 0 | Status: SHIPPED | OUTPATIENT
Start: 2019-07-03 | End: 2019-08-09

## 2019-07-03 RX ORDER — ONDANSETRON 2 MG/ML
4 INJECTION INTRAMUSCULAR; INTRAVENOUS ONCE
Status: COMPLETED | OUTPATIENT
Start: 2019-07-03 | End: 2019-07-03

## 2019-07-03 RX ORDER — DICYCLOMINE HCL 20 MG
20 TABLET ORAL EVERY 6 HOURS PRN
Qty: 20 TABLET | Refills: 0 | Status: SHIPPED | OUTPATIENT
Start: 2019-07-03 | End: 2019-07-08 | Stop reason: SDUPTHER

## 2019-07-03 RX ORDER — DICYCLOMINE HCL 20 MG
20 TABLET ORAL ONCE
Status: COMPLETED | OUTPATIENT
Start: 2019-07-03 | End: 2019-07-03

## 2019-07-03 RX ADMIN — ONDANSETRON 4 MG: 2 INJECTION INTRAMUSCULAR; INTRAVENOUS at 22:30

## 2019-07-03 RX ADMIN — SODIUM CHLORIDE 1000 ML: 0.9 INJECTION, SOLUTION INTRAVENOUS at 22:30

## 2019-07-03 RX ADMIN — DICYCLOMINE HYDROCHLORIDE 20 MG: 20 TABLET ORAL at 22:35

## 2019-07-03 RX ADMIN — KETOROLAC TROMETHAMINE 30 MG: 30 INJECTION, SOLUTION INTRAMUSCULAR; INTRAVENOUS at 22:30

## 2019-07-04 NOTE — ED PROVIDER NOTES
History  Chief Complaint   Patient presents with    Abdominal Pain     NVD  Generalized abd pain  Flank pain bilateral  Symptoms intermittent x 1 week, but worse the past 3 days  27 yo male who has had generalized abd cramping discomfort with NVD x past 5 days  At times pain wraps around to lower back  History provided by:  Patient and significant other   used: No    Abdominal Pain   Pain location:  Generalized  Pain quality: cramping    Pain radiates to:  Back  Pain severity:  Moderate  Onset quality:  Gradual  Duration:  5 days  Timing:  Constant  Progression:  Waxing and waning  Chronicity:  New  Relieved by:  Nothing  Worsened by:  Nothing  Ineffective treatments:  None tried  Associated symptoms: anorexia, diarrhea, fatigue, nausea and vomiting    Associated symptoms: no chest pain, no chills, no dysuria, no fever, no hematuria, no shortness of breath and no sore throat    Diarrhea:     Quality:  Watery    Severity:  Moderate    Duration:  5 days    Timing:  Constant  Fatigue:     Severity:  Mild    Duration:  5 days    Timing:  Constant  Nausea:     Severity:  Moderate    Onset quality:  Gradual    Duration:  5 days    Timing:  Constant    Progression:  Waxing and waning  Vomiting:     Quality:  Stomach contents    Severity:  Mild    Duration:  5 days    Timing:  Intermittent  Risk factors: has not had multiple surgeries        Prior to Admission Medications   Prescriptions Last Dose Informant Patient Reported?  Taking?   ibuprofen (MOTRIN) 600 mg tablet   Yes No   Sig: Take 600 mg by mouth every 8 (eight) hours as needed      Facility-Administered Medications: None       Past Medical History:   Diagnosis Date    Duodenitis        Past Surgical History:   Procedure Laterality Date    ESOPHAGOGASTRODUODENOSCOPY  08/2018    duodenitis       Family History   Problem Relation Age of Onset    Hypertension Mother      I have reviewed and agree with the history as documented  Social History     Tobacco Use    Smoking status: Never Smoker    Smokeless tobacco: Never Used   Substance Use Topics    Alcohol use: Not Currently     Frequency: Monthly or less     Drinks per session: 1 or 2     Binge frequency: Never     Comment: occasionally    Drug use: No        Review of Systems   Constitutional: Positive for appetite change and fatigue  Negative for chills and fever  HENT: Negative for congestion and sore throat  Eyes: Negative for visual disturbance  Respiratory: Negative for shortness of breath and wheezing  Cardiovascular: Negative for chest pain and palpitations  Gastrointestinal: Positive for abdominal pain, anorexia, diarrhea, nausea and vomiting  Genitourinary: Negative for dysuria and hematuria  Musculoskeletal: Positive for back pain (sometime mild low back ache)  Negative for neck pain and neck stiffness  Skin: Negative for pallor and rash  Neurological: Negative for headaches  Psychiatric/Behavioral: Negative for confusion  All other systems reviewed and are negative  Physical Exam  Physical Exam   Constitutional: He is oriented to person, place, and time  He appears well-developed and well-nourished  No distress  HENT:   Head: Normocephalic and atraumatic  Right Ear: External ear normal    Left Ear: External ear normal    MM tachy   Eyes: EOM are normal    Neck: Neck supple  Cardiovascular: Normal rate and regular rhythm  No murmur heard  Pulmonary/Chest: Effort normal and breath sounds normal    Abdominal: Soft  Bowel sounds are normal  He exhibits no distension  There is no tenderness  There is no CVA tenderness  Musculoskeletal: Normal range of motion  He exhibits no edema  Neurological: He is alert and oriented to person, place, and time  Skin: Skin is warm  No rash noted  No pallor  Psychiatric: He has a normal mood and affect  His behavior is normal    Nursing note and vitals reviewed        Vital Signs  ED Triage Vitals [07/03/19 2138]   Temperature Pulse Respirations Blood Pressure SpO2   98 1 °F (36 7 °C) 81 20 131/92 100 %      Temp Source Heart Rate Source Patient Position - Orthostatic VS BP Location FiO2 (%)   Temporal Monitor Sitting Right arm --      Pain Score       Worst Possible Pain           Vitals:    07/03/19 2138 07/03/19 2338   BP: 131/92 131/70   Pulse: 81 82   Patient Position - Orthostatic VS: Sitting Lying         Visual Acuity      ED Medications  Medications   sodium chloride 0 9 % bolus 1,000 mL (0 mL Intravenous Stopped 7/3/19 2335)   ondansetron (ZOFRAN) injection 4 mg (4 mg Intravenous Given 7/3/19 2230)   ketorolac (TORADOL) injection 30 mg (30 mg Intravenous Given 7/3/19 2230)   dicyclomine (BENTYL) tablet 20 mg (20 mg Oral Given 7/3/19 2235)       Diagnostic Studies  Results Reviewed     Procedure Component Value Units Date/Time    Fingerstick Glucose (POCT) [[id number]]  (Normal) Collected:  07/03/19 2315    Lab Status:  Final result Updated:  07/03/19 2316     POC Glucose 98 mg/dl     Comprehensive metabolic panel [[id number]]  (Abnormal) Collected:  07/03/19 2228    Lab Status:  Final result Specimen:  Blood from Arm, Right Updated:  07/03/19 2256     Sodium 141 mmol/L      Potassium 3 6 mmol/L      Chloride 105 mmol/L      CO2 33 mmol/L      ANION GAP 3 mmol/L      BUN 12 mg/dL      Creatinine 1 16 mg/dL      Glucose 56 mg/dL      Calcium 8 8 mg/dL      AST 29 U/L      ALT 66 U/L      Alkaline Phosphatase 51 U/L      Total Protein 7 0 g/dL      Albumin 3 4 g/dL      Total Bilirubin 0 39 mg/dL      eGFR 89 ml/min/1 73sq m     Narrative:       [place] guidelines for Chronic Kidney Disease (CKD):     Stage 1 with normal or high GFR (GFR > 90 mL/min/1 73 square meters)    Stage 2 Mild CKD (GFR = 60-89 mL/min/1 73 square meters)    Stage 3A Moderate CKD (GFR = 45-59 mL/min/1 73 square meters)    Stage 3B Moderate CKD (GFR = 30-44 mL/min/1 73 square meters)   Stage 4 Severe CKD (GFR = 15-29 mL/min/1 73 square meters)    Stage 5 End Stage CKD (GFR <15 mL/min/1 73 square meters)  Note: GFR calculation is accurate only with a steady state creatinine    Lipase [[id number]]  (Normal) Collected:  07/03/19 2228    Lab Status:  Final result Specimen:  Blood from Arm, Right Updated:  07/03/19 2256     Lipase 192 u/L     CBC and differential [[id number]]  (Abnormal) Collected:  07/03/19 2228    Lab Status:  Final result Specimen:  Blood from Arm, Right Updated:  07/03/19 2241     WBC 9 18 Thousand/uL      RBC 5 01 Million/uL      Hemoglobin 15 8 g/dL      Hematocrit 47 2 %      MCV 94 fL      MCH 31 5 pg      MCHC 33 5 g/dL      RDW 12 3 %      MPV 10 7 fL      Platelets 869 Thousands/uL      nRBC 0 /100 WBCs      Neutrophils Relative 39 %      Immat GRANS % 0 %      Lymphocytes Relative 28 %      Monocytes Relative 5 %      Eosinophils Relative 27 %      Basophils Relative 1 %      Neutrophils Absolute 3 57 Thousands/µL      Immature Grans Absolute 0 02 Thousand/uL      Lymphocytes Absolute 2 58 Thousands/µL      Monocytes Absolute 0 47 Thousand/µL      Eosinophils Absolute 2 48 Thousand/µL      Basophils Absolute 0 06 Thousands/µL     POCT urinalysis dipstick [[id number]]  (Normal) Resulted:  07/03/19 2224    Lab Status:  Final result Specimen:  Urine Updated:  07/03/19 2225     Color, UA Yellow, Clear    ED Urine Macroscopic [[id number]] Collected:  07/03/19 2235    Lab Status:  Final result Specimen:  Urine Updated:  07/03/19 2224     Color, UA Yellow     Clarity, UA Clear     pH, UA 6 0     Leukocytes, UA Negative     Nitrite, UA Negative     Protein, UA Negative mg/dl      Glucose, UA Negative mg/dl      Ketones, UA Negative mg/dl      Urobilinogen, UA 1 0 E U /dl      Bilirubin, UA Negative     Blood, UA Negative     Specific Gravity, UA >=1 030    Narrative:       CLINITEK RESULT                 No orders to display              Procedures  Procedures       ED Course  ED Course as of Jul 04 0520 Wed Jul 03, 2019   2212 Pt seen and examined  27 yo male who has had generalized abd cramping discomfort with NVD x past 5 days  At times pain wraps around to lower back  Pt well appearing, mild dehydration as MM tachy  Abd exam benign - nontender  Will dip urine, check labs and give IVF toradol, bentyl and zofran  2254 Pt resting comfortably getting IVF after medicated  CBC and urine WNL other than SG >1030        2312 BS 56 on CMP - pt asymptomatic - will recheck and treat as needed  2317 BS 96 without intervention and pt feels great  Will d/c home and prn zofran and bentyl  MDM    Disposition  Final diagnoses:   Nausea, vomiting and diarrhea   Dehydration   Abdominal cramping     Time reflects when diagnosis was documented in both MDM as applicable and the Disposition within this note     Time User Action Codes Description Comment    7/3/2019 11:18 PM Nely Cage Add [R11 2,  R19 7] Nausea, vomiting and diarrhea     7/3/2019 11:19 PM Nely Cage Add [E86 0] Dehydration     7/3/2019 11:19 PM Nely Cage Add [R10 9] Abdominal cramping       ED Disposition     ED Disposition Condition Date/Time Comment    Discharge Stable Wed Jul 3, 2019 11:18 PM Fernanda Tinoco discharge to home/self care              Follow-up Information     Follow up With Specialties Details Why Contact Info    Andreas Wolfe MD Family Medicine Schedule an appointment as soon as possible for a visit  As needed [address]  [address] [address]            Discharge Medication List as of 7/3/2019 11:19 PM      START taking these medications    Details   dicyclomine (BENTYL) 20 mg tablet Take 1 tablet (20 mg total) by mouth every 6 (six) hours as needed (abd cramping) for up to 5 days, Starting Wed 7/3/2019, Until Mon 7/8/2019, Print      ondansetron (ZOFRAN-ODT) 4 mg disintegrating tablet Take 1 tablet (4 mg total) by mouth every 8 (eight) hours as needed for nausea or vomiting for up to 7 days, Starting Wed 7/3/2019, Until Wed 7/10/2019, Print         CONTINUE these medications which have NOT CHANGED    Details   ibuprofen (MOTRIN) 600 mg tablet Take 600 mg by mouth every 8 (eight) hours as needed, Starting Wed 6/19/2019, Historical Med           No discharge procedures on file      ED Provider  Electronically Signed by           Familia Ovalles DO  07/04/19 7711

## 2019-07-08 ENCOUNTER — APPOINTMENT (OUTPATIENT)
Dept: LAB | Facility: HOSPITAL | Age: 24
End: 2019-07-08
Payer: COMMERCIAL

## 2019-07-08 ENCOUNTER — OFFICE VISIT (OUTPATIENT)
Dept: FAMILY MEDICINE CLINIC | Facility: CLINIC | Age: 24
End: 2019-07-08
Payer: COMMERCIAL

## 2019-07-08 ENCOUNTER — TRANSCRIBE ORDERS (OUTPATIENT)
Dept: ADMINISTRATIVE | Facility: HOSPITAL | Age: 24
End: 2019-07-08

## 2019-07-08 VITALS
HEART RATE: 72 BPM | BODY MASS INDEX: 31.83 KG/M2 | DIASTOLIC BLOOD PRESSURE: 80 MMHG | WEIGHT: 235 LBS | SYSTOLIC BLOOD PRESSURE: 122 MMHG | TEMPERATURE: 97.8 F | HEIGHT: 72 IN | RESPIRATION RATE: 18 BRPM | OXYGEN SATURATION: 98 %

## 2019-07-08 DIAGNOSIS — R19.7 DIARRHEA, UNSPECIFIED TYPE: Primary | ICD-10-CM

## 2019-07-08 DIAGNOSIS — R10.9 ABDOMINAL CRAMPING: ICD-10-CM

## 2019-07-08 DIAGNOSIS — R19.7 DIARRHEA, UNSPECIFIED TYPE: ICD-10-CM

## 2019-07-08 PROCEDURE — 87338 HPYLORI STOOL AG IA: CPT

## 2019-07-08 PROCEDURE — 82784 ASSAY IGA/IGD/IGG/IGM EACH: CPT

## 2019-07-08 PROCEDURE — 86255 FLUORESCENT ANTIBODY SCREEN: CPT

## 2019-07-08 PROCEDURE — 87209 SMEAR COMPLEX STAIN: CPT

## 2019-07-08 PROCEDURE — 87505 NFCT AGENT DETECTION GI: CPT

## 2019-07-08 PROCEDURE — 99214 OFFICE O/P EST MOD 30 MIN: CPT | Performed by: NURSE PRACTITIONER

## 2019-07-08 PROCEDURE — 83516 IMMUNOASSAY NONANTIBODY: CPT

## 2019-07-08 PROCEDURE — 87177 OVA AND PARASITES SMEARS: CPT

## 2019-07-08 PROCEDURE — 36415 COLL VENOUS BLD VENIPUNCTURE: CPT

## 2019-07-08 PROCEDURE — 3008F BODY MASS INDEX DOCD: CPT | Performed by: NURSE PRACTITIONER

## 2019-07-08 RX ORDER — DICYCLOMINE HCL 20 MG
20 TABLET ORAL 3 TIMES DAILY
Qty: 90 TABLET | Refills: 0 | Status: SHIPPED | OUTPATIENT
Start: 2019-07-08 | End: 2019-08-09

## 2019-07-08 RX ORDER — LOPERAMIDE HYDROCHLORIDE 2 MG/1
2 CAPSULE ORAL 4 TIMES DAILY PRN
Qty: 30 CAPSULE | Refills: 1 | Status: SHIPPED | OUTPATIENT
Start: 2019-07-08 | End: 2019-08-09

## 2019-07-08 NOTE — PROGRESS NOTES
Assessment/Plan:    Abdominal cramping  This is likely related to IBS  I recommended patient use bentyl for cramping as this helps with his abdominal discomfort  Discussed with patient that main goal of treatment is to decrease severity of symptoms and improve quality of life  I recommended to pt lifestyle modifications such as avoiding precipitating substances like caffeine, lactose or fructose  Increasing intake of probiotics  For diarrhea bouts I am advising patient take loperamide after a loose stool  I am also prescribing bentyl for the pain and bloating  Diarrhea  -Ordering work up to rule out cause of diarrhea  Advised to eat healthy diet and avoid lactose and high in fat content foods      -Rx for loperamide given to patient today  Diagnoses and all orders for this visit:    Diarrhea, unspecified type  -     Ova and parasite examination; Future  -     H  pylori antigen, stool; Future  -     Giardia antigen; Future  -     Stool Enteric Bacterial Panel by PCR; Future  -     Lactose tolerance test; Future  -     Celiac Disease Diagnostic Panel; Future  -     loperamide (IMODIUM) 2 mg capsule; Take 1 capsule (2 mg total) by mouth 4 (four) times a day as needed for diarrhea    Abdominal cramping  -     dicyclomine (BENTYL) 20 mg tablet; Take 1 tablet (20 mg total) by mouth 3 (three) times a day          Subjective:      Patient ID: Elena Bailey is a 26 y o  male  Cc  Per pt x 2 weeks severe abdominal pain and diarrhea  26year old male patient presents today with abdominal pain and diarrhea  States that he has been this way x 2 weeks  Was in the ER and everything was normal and was given Bentyl to help alleviate his discomfort  States it has helped somewhat  Pt also states he suffers from gastritis as well and had an endoscopy done in the past  Would like to be further evaluated  Diarrhea    This is a recurrent problem  The current episode started 1 to 4 weeks ago (2 weeks)   The problem occurs 5 to 10 times per day  The problem has been gradually worsening  The stool consistency is described as watery  The patient states that diarrhea awakens him from sleep  Associated symptoms include abdominal pain, bloating, increased flatus, vomiting and weight loss  Pertinent negatives include no coughing, fever, headaches or URI  The symptoms are aggravated by dairy products, stress and rye/wheat (anything he eats causes diarrhea)  There are no known risk factors  Treatments tried: anti-spasmotic  The treatment provided moderate relief  There is no history of bowel resection, irritable bowel syndrome or a recent abdominal surgery  Abdominal Pain   Associated symptoms include diarrhea, flatus, vomiting and weight loss  Pertinent negatives include no fever or headaches  There is no history of irritable bowel syndrome  The following portions of the patient's history were reviewed and updated as appropriate: allergies, current medications, past family history, past medical history, past social history, past surgical history and problem list     Review of Systems   Constitutional: Positive for weight loss  Negative for fever  HENT: Negative  Eyes: Negative  Respiratory: Negative  Negative for cough, chest tightness and wheezing  Cardiovascular: Negative  Negative for chest pain and palpitations  Gastrointestinal: Positive for abdominal pain, bloating, diarrhea, flatus and vomiting  Endocrine: Negative  Genitourinary: Negative  Musculoskeletal: Negative  Skin: Negative  Negative for color change and rash  Allergic/Immunologic: Negative  Neurological: Negative  Negative for headaches  Hematological: Negative  Psychiatric/Behavioral: Negative            Objective:      /80 (BP Location: Left arm, Patient Position: Sitting, Cuff Size: Standard)   Pulse 72   Temp 97 8 °F (36 6 °C) (Temporal)   Resp 18   Ht 6' (1 829 m)   Wt 107 kg (235 lb)   SpO2 98%   BMI 31 87 kg/m²          Physical Exam   Constitutional: He is oriented to person, place, and time  He appears well-developed and well-nourished  HENT:   Head: Normocephalic and atraumatic  Mouth/Throat: Oropharynx is clear and moist    Eyes: Pupils are equal, round, and reactive to light  EOM are normal    Neck: Normal range of motion  Neck supple  Cardiovascular: Normal rate and regular rhythm  Exam reveals no gallop and no friction rub  No murmur heard  Pulmonary/Chest: Effort normal and breath sounds normal  No respiratory distress  He has no wheezes  Abdominal: Soft  Normal appearance and bowel sounds are normal  There is generalized tenderness  There is no rigidity, no rebound, no guarding, no CVA tenderness, no tenderness at McBurney's point and negative Rodriguez's sign  Lymphadenopathy:     He has no cervical adenopathy  Neurological: He is alert and oriented to person, place, and time  Skin: Skin is warm and dry  Capillary refill takes less than 2 seconds  Psychiatric: He has a normal mood and affect  His behavior is normal    Nursing note and vitals reviewed

## 2019-07-09 PROBLEM — R19.7 DIARRHEA: Status: ACTIVE | Noted: 2019-07-09

## 2019-07-09 PROBLEM — R10.9 ABDOMINAL CRAMPING: Status: ACTIVE | Noted: 2019-07-09

## 2019-07-09 LAB
CAMPYLOBACTER DNA SPEC NAA+PROBE: NORMAL
ENDOMYSIUM IGA SER QL: NEGATIVE
GLIADIN PEPTIDE IGA SER-ACNC: 62 UNITS (ref 0–19)
GLIADIN PEPTIDE IGG SER-ACNC: 2 UNITS (ref 0–19)
H PYLORI AG STL QL IA: NEGATIVE
IGA SERPL-MCNC: 235 MG/DL (ref 90–386)
SALMONELLA DNA SPEC QL NAA+PROBE: NORMAL
SHIGA TOXIN STX GENE SPEC NAA+PROBE: NORMAL
SHIGELLA DNA SPEC QL NAA+PROBE: NORMAL
TTG IGA SER-ACNC: <2 U/ML (ref 0–3)
TTG IGG SER-ACNC: 8 U/ML (ref 0–5)

## 2019-07-09 NOTE — ASSESSMENT & PLAN NOTE
-Ordering work up to rule out cause of diarrhea  Advised to eat healthy diet and avoid lactose and high in fat content foods      -Rx for loperamide given to patient today

## 2019-07-10 DIAGNOSIS — R76.8 IGG GLIADIN ANTIBODY POSITIVE: Primary | ICD-10-CM

## 2019-07-10 DIAGNOSIS — K90.9 INTESTINAL MALABSORPTION, UNSPECIFIED TYPE: ICD-10-CM

## 2019-07-10 LAB
G LAMBLIA AG STL QL IA: NEGATIVE
O+P STL CONC: NORMAL

## 2019-08-09 ENCOUNTER — HOSPITAL ENCOUNTER (EMERGENCY)
Facility: HOSPITAL | Age: 24
Discharge: HOME | End: 2019-08-09
Attending: EMERGENCY MEDICINE
Payer: COMMERCIAL

## 2019-08-09 VITALS
TEMPERATURE: 97.8 F | SYSTOLIC BLOOD PRESSURE: 136 MMHG | RESPIRATION RATE: 18 BRPM | OXYGEN SATURATION: 99 % | WEIGHT: 229.72 LBS | BODY MASS INDEX: 31.16 KG/M2 | DIASTOLIC BLOOD PRESSURE: 61 MMHG | HEART RATE: 69 BPM

## 2019-08-09 DIAGNOSIS — J06.9 VIRAL UPPER RESPIRATORY TRACT INFECTION: Primary | ICD-10-CM

## 2019-08-09 PROCEDURE — 99283 EMERGENCY DEPT VISIT LOW MDM: CPT

## 2019-08-09 PROCEDURE — 99283 EMERGENCY DEPT VISIT LOW MDM: CPT | Performed by: PHYSICIAN ASSISTANT

## 2019-08-18 NOTE — ED PROVIDER NOTES
History  Chief Complaint   Patient presents with    Earache     Pt reports b/l ear pain with headache that began this am around 03:00  Subjective fever at home  Swimming [address] this week    Cough     reports non productive cough     26year old male PMH gastritis presents today complaining of bilateral ear pain that began this morning  Pt reports associated headache when the pain began  Denies fever, visual changes, dizziness or lightheadedness  Also admits to cough and slight sore throat  Cough is nonproductive  Denies wheezing or shortness of breath  No sick contacts  Nothing taken for his symptoms  None       Past Medical History:   Diagnosis Date    Duodenitis     Gastritis        Past Surgical History:   Procedure Laterality Date    ESOPHAGOGASTRODUODENOSCOPY  08/2018    duodenitis       Family History   Problem Relation Age of Onset    Hypertension Mother      I have reviewed and agree with the history as documented  Social History     Tobacco Use    Smoking status: Current Some Day Smoker    Smokeless tobacco: Never Used    Tobacco comment: Hookah   Substance Use Topics    Alcohol use: Not Currently     Frequency: Monthly or less     Drinks per session: 1 or 2     Binge frequency: Never     Comment: occasionally    Drug use: No        Review of Systems   HENT: Positive for ear pain and sore throat  Respiratory: Positive for cough  Neurological: Positive for headaches  All other systems reviewed and are negative  Physical Exam  Physical Exam   Constitutional: He appears well-developed and well-nourished  No distress  HENT:   Head: Normocephalic and atraumatic  Right Ear: Tympanic membrane normal    Left Ear: Tympanic membrane normal    Mouth/Throat: Oropharynx is clear and moist  No oropharyngeal exudate  Eyes: Conjunctivae and EOM are normal    Neck: Normal range of motion  Neck supple  Cardiovascular: Normal rate, regular rhythm and normal heart sounds  Pulmonary/Chest: Effort normal and breath sounds normal  No stridor  No respiratory distress  He has no wheezes  Abdominal: Soft  He exhibits no distension  There is no tenderness  There is no guarding  Musculoskeletal: Normal range of motion  Lymphadenopathy:     He has no cervical adenopathy  Neurological: He is alert  Skin: Skin is warm and dry  Capillary refill takes less than 2 seconds  No rash noted  He is not diaphoretic  Psychiatric: He has a normal mood and affect  His behavior is normal        Vital Signs  ED Triage Vitals [08/09/19 1532]   Temperature Pulse Respirations Blood Pressure SpO2   97 8 °F (36 6 °C) 69 18 136/61 99 %      Temp Source Heart Rate Source Patient Position - Orthostatic VS BP Location FiO2 (%)   Temporal Monitor Sitting Right arm --      Pain Score       Worst Possible Pain           Vitals:    08/09/19 1532   BP: 136/61   Pulse: 69   Patient Position - Orthostatic VS: Sitting         Visual Acuity      ED Medications  Medications - No data to display    Diagnostic Studies  Results Reviewed     None                 No orders to display              Procedures  Procedures       ED Course                               MDM    Disposition  Final diagnoses:   Viral upper respiratory tract infection     Time reflects when diagnosis was documented in both MDM as applicable and the Disposition within this note     Time User Action Codes Description Comment    8/9/2019  3:51 PM Girish Montgomery Add [J06 9] Viral upper respiratory tract infection       ED Disposition     ED Disposition Condition Date/Time Comment    Discharge Stable Fri Aug 9, 2019  3:51 PM Fernanda Tinoco discharge to home/self care              Follow-up Information     Follow up With Specialties Details Why Contact Info    Regla Issa MD [facility] Medicine   [address] [place] [address]  [address] [facility] [address]  [place] [address] [facility]  [phone]            Discharge Medication List as of 8/9/2019  3:53 PM      START taking these medications    Details   dextromethorphan-guaifenesin (MUCINEX DM)  MG per 12 hr tablet Take 1 tablet by mouth every 12 (twelve) hours, Starting Fri 8/9/2019, Print           No discharge procedures on file      ED Provider  Electronically Signed by           Musa Tran PA-C  08/18/19 2702 No

## 2019-10-11 ENCOUNTER — OFFICE VISIT (OUTPATIENT)
Dept: FAMILY MEDICINE CLINIC | Facility: CLINIC | Age: 24
End: 2019-10-11
Payer: COMMERCIAL

## 2019-10-11 VITALS
HEIGHT: 72 IN | HEART RATE: 88 BPM | TEMPERATURE: 98.5 F | SYSTOLIC BLOOD PRESSURE: 110 MMHG | BODY MASS INDEX: 30.45 KG/M2 | WEIGHT: 224.8 LBS | OXYGEN SATURATION: 98 % | DIASTOLIC BLOOD PRESSURE: 80 MMHG

## 2019-10-11 DIAGNOSIS — Z23 NEED FOR INFLUENZA VACCINATION: ICD-10-CM

## 2019-10-11 DIAGNOSIS — R30.0 DYSURIA: Primary | ICD-10-CM

## 2019-10-11 DIAGNOSIS — R31.9 HEMATURIA, UNSPECIFIED TYPE: ICD-10-CM

## 2019-10-11 DIAGNOSIS — Z23 NEED FOR TDAP VACCINATION: ICD-10-CM

## 2019-10-11 LAB
SL AMB  POCT GLUCOSE, UA: ABNORMAL
SL AMB LEUKOCYTE ESTERASE,UA: ABNORMAL
SL AMB POCT BILIRUBIN,UA: ABNORMAL
SL AMB POCT BLOOD,UA: ABNORMAL
SL AMB POCT CLARITY,UA: ABNORMAL
SL AMB POCT COLOR,UA: YELLOW
SL AMB POCT KETONES,UA: ABNORMAL
SL AMB POCT NITRITE,UA: ABNORMAL
SL AMB POCT PH,UA: 7.5
SL AMB POCT SPECIFIC GRAVITY,UA: 1.01
SL AMB POCT URINE PROTEIN: ABNORMAL
SL AMB POCT UROBILINOGEN: 0.2

## 2019-10-11 PROCEDURE — 3008F BODY MASS INDEX DOCD: CPT | Performed by: NURSE PRACTITIONER

## 2019-10-11 PROCEDURE — 90715 TDAP VACCINE 7 YRS/> IM: CPT | Performed by: NURSE PRACTITIONER

## 2019-10-11 PROCEDURE — 87086 URINE CULTURE/COLONY COUNT: CPT | Performed by: NURSE PRACTITIONER

## 2019-10-11 PROCEDURE — 99214 OFFICE O/P EST MOD 30 MIN: CPT | Performed by: NURSE PRACTITIONER

## 2019-10-11 PROCEDURE — 81002 URINALYSIS NONAUTO W/O SCOPE: CPT | Performed by: NURSE PRACTITIONER

## 2019-10-11 PROCEDURE — 90471 IMMUNIZATION ADMIN: CPT | Performed by: NURSE PRACTITIONER

## 2019-10-11 RX ORDER — CIPROFLOXACIN 500 MG/1
500 TABLET, FILM COATED ORAL EVERY 12 HOURS SCHEDULED
Qty: 14 TABLET | Refills: 0 | Status: SHIPPED | OUTPATIENT
Start: 2019-10-11 | End: 2019-10-18

## 2019-10-11 NOTE — PROGRESS NOTES
Assessment/Plan:    Dysuria  -Today I am starting patient on antibiotics as he is presenting with symptoms but no leukocytes or nitrates in urine  Sending urine out for culture  Discussed with patient the goal of treatment is to eradicate the bacteria  I am sending out urinalysis and culture  Due to men having complicated UTI's I am starting on cipro 500mg BID as this is first line treatment for UTI  I discussed side effects associated with fluoroquinolones with patient  Advised if s/s do not resolve after 1 week or worsen to call office  Also sending for US or kidneys due to hematuria  Diagnoses and all orders for this visit:    Dysuria  -     POCT urine dip  -     Cancel: UA w Reflex to Microscopic w Reflex to Culture  -     ciprofloxacin (CIPRO) 500 mg tablet; Take 1 tablet (500 mg total) by mouth every 12 (twelve) hours for 7 days  -     Urine culture    Need for influenza vaccination  -     influenza vaccine, [id number], quadrivalent, 0 5 mL, preservative-free, for adult and pediatric patients 6 mos+ (AFLURIA, FLUARIX, FLULAVAL, FLUZONE)    Need for Tdap vaccination  -     TDAP VACCINE GREATER THAN OR EQUAL TO 8YO IM    Hematuria, unspecified type  -     US retroperitoneal complete; Future  -     Urine culture          Subjective:      Patient ID: Vannessa Hinojosa is a 26 y o  male  26year old male patient presents today for burning on urination  States this started about 1 week ago  States every urination it burns to go and feels he is going more often then not  The pain is about a 9/10 when he urinates  States he [place]ipicillin from [place] and took some yesterday but his burning has not resolved and decided to come in to get checked  He is currently sexually active with his wife  Has never had any UTI's, urinary stasis, or any problems with his kidneys  Denies any flank pain or hematuria  No fever or chills  Did admit to some discomfort in suprapubic area but not painful  Difficulty Urinating    This is a new problem  The current episode started in the past 7 days  The problem occurs every urination  The problem has been gradually worsening  The quality of the pain is described as burning  The pain is at a severity of 9/10  The pain is moderate  There has been no fever  The fever has been present for less than 1 day  He is sexually active  There is no history of pyelonephritis  Associated symptoms include frequency and urgency  Pertinent negatives include no discharge, flank pain, hematuria, nausea or vomiting  He has tried antibiotics (ampicillin yesterday) for the symptoms  The treatment provided mild relief  There is no history of catheterization, kidney stones, recurrent UTIs, a single kidney, urinary stasis or a urological procedure  The following portions of the patient's history were reviewed and updated as appropriate: allergies, current medications, past family history, past medical history, past social history, past surgical history and problem list     Review of Systems   Constitutional: Negative  Negative for appetite change, fever and unexpected weight change  HENT: Negative  Eyes: Negative  Respiratory: Negative  Negative for cough, chest tightness, shortness of breath and wheezing  Cardiovascular: Negative  Negative for chest pain and palpitations  Gastrointestinal: Negative  Negative for abdominal distention, anal bleeding, nausea and vomiting  Endocrine: Negative  Genitourinary: Positive for dysuria, frequency and urgency  Negative for decreased urine volume, discharge, flank pain, genital sores, hematuria, penile pain, penile swelling, scrotal swelling and testicular pain  Musculoskeletal: Negative  Skin: Negative  Allergic/Immunologic: Negative  Neurological: Negative  Hematological: Negative  Psychiatric/Behavioral: Negative            Objective:      /80 (BP Location: Left arm, Patient Position: Sitting, Cuff Size: Adult)   Pulse 88   Temp 98 5 °F (36 9 °C) (Oral)   Ht 6' (1 829 m)   Wt 102 kg (224 lb 12 8 oz)   SpO2 98%   BMI 30 49 kg/m²          Physical Exam   Constitutional: He is oriented to person, place, and time  He appears well-developed and well-nourished  HENT:   Head: Normocephalic and atraumatic  Right Ear: External ear normal    Left Ear: External ear normal    Eyes: Pupils are equal, round, and reactive to light  EOM are normal    Neck: Normal range of motion  Neck supple  Cardiovascular: Normal rate, regular rhythm, normal heart sounds and intact distal pulses  Exam reveals no gallop and no friction rub  No murmur heard  Pulmonary/Chest: Effort normal and breath sounds normal  No respiratory distress  He has no wheezes  Abdominal: Soft  Normal appearance and bowel sounds are normal  He exhibits no distension and no mass  There is no tenderness  There is no rigidity, no rebound, no guarding, no CVA tenderness, no tenderness at McBurney's point and negative Rodriguez's sign  Lymphadenopathy:     He has no cervical adenopathy  Neurological: He is alert and oriented to person, place, and time  Skin: Skin is warm and dry  Capillary refill takes less than 2 seconds  Psychiatric: He has a normal mood and affect  Thought content normal    Nursing note and vitals reviewed

## 2019-10-12 LAB — BACTERIA UR CULT: NORMAL

## 2019-10-14 ENCOUNTER — HOSPITAL ENCOUNTER (OUTPATIENT)
Dept: ULTRASOUND IMAGING | Facility: HOSPITAL | Age: 24
Discharge: HOME | End: 2019-10-14
Payer: COMMERCIAL

## 2019-10-14 DIAGNOSIS — R31.9 HEMATURIA, UNSPECIFIED TYPE: ICD-10-CM

## 2019-10-14 PROBLEM — R30.0 DYSURIA: Status: ACTIVE | Noted: 2019-10-14

## 2019-10-14 PROCEDURE — 76770 US EXAM ABDO BACK WALL COMP: CPT

## 2019-10-14 NOTE — ASSESSMENT & PLAN NOTE
-Today I am starting patient on antibiotics as he is presenting with symptoms but no leukocytes or nitrates in urine  Sending urine out for culture  Discussed with patient the goal of treatment is to eradicate the bacteria  I am sending out urinalysis and culture  Due to men having complicated UTI's I am starting on cipro 500mg BID as this is first line treatment for UTI  I discussed side effects associated with fluoroquinolones with patient  Advised if s/s do not resolve after 1 week or worsen to call office  Also sending for US or kidneys due to hematuria

## 2019-11-15 ENCOUNTER — HOSPITAL ENCOUNTER (EMERGENCY)
Facility: HOSPITAL | Age: 24
Discharge: LEFT BEFORE DISCHARGE | End: 2019-11-15

## 2019-11-15 VITALS
TEMPERATURE: 98.5 F | WEIGHT: 220.46 LBS | RESPIRATION RATE: 16 BRPM | SYSTOLIC BLOOD PRESSURE: 131 MMHG | DIASTOLIC BLOOD PRESSURE: 59 MMHG | BODY MASS INDEX: 29.9 KG/M2 | HEART RATE: 89 BPM | OXYGEN SATURATION: 100 %

## 2019-11-15 LAB
ALBUMIN SERPL BCP-MCNC: 4.4 G/DL (ref 3.5–5)
ALP SERPL-CCNC: 64 U/L (ref 46–116)
ALT SERPL W P-5'-P-CCNC: 29 U/L (ref 12–78)
ANION GAP SERPL CALCULATED.3IONS-SCNC: 7 MMOL/L (ref 4–13)
AST SERPL W P-5'-P-CCNC: 21 U/L (ref 5–45)
BASOPHILS # BLD AUTO: 0.03 THOUSANDS/ΜL (ref 0–0.1)
BASOPHILS NFR BLD AUTO: 0 % (ref 0–1)
BILIRUB SERPL-MCNC: 0.75 MG/DL (ref 0.2–1)
BUN SERPL-MCNC: 13 MG/DL (ref 5–25)
CALCIUM SERPL-MCNC: 9.7 MG/DL (ref 8.3–10.1)
CHLORIDE SERPL-SCNC: 103 MMOL/L (ref 100–108)
CO2 SERPL-SCNC: 31 MMOL/L (ref 21–32)
CREAT SERPL-MCNC: 1.12 MG/DL (ref 0.6–1.3)
EOSINOPHIL # BLD AUTO: 0.17 THOUSAND/ΜL (ref 0–0.61)
EOSINOPHIL NFR BLD AUTO: 2 % (ref 0–6)
ERYTHROCYTE [DISTWIDTH] IN BLOOD BY AUTOMATED COUNT: 12.8 % (ref 11.6–15.1)
GFR SERPL CREATININE-BSD FRML MDRD: 93 ML/MIN/1.73SQ M
GLUCOSE SERPL-MCNC: 98 MG/DL (ref 65–140)
HCT VFR BLD AUTO: 57.3 % (ref 36.5–49.3)
HGB BLD-MCNC: 18.6 G/DL (ref 12–17)
IMM GRANULOCYTES # BLD AUTO: 0.04 THOUSAND/UL (ref 0–0.2)
IMM GRANULOCYTES NFR BLD AUTO: 0 % (ref 0–2)
LIPASE SERPL-CCNC: 127 U/L (ref 73–393)
LYMPHOCYTES # BLD AUTO: 0.75 THOUSANDS/ΜL (ref 0.6–4.47)
LYMPHOCYTES NFR BLD AUTO: 6 % (ref 14–44)
MCH RBC QN AUTO: 30.9 PG (ref 26.8–34.3)
MCHC RBC AUTO-ENTMCNC: 32.5 G/DL (ref 31.4–37.4)
MCV RBC AUTO: 95 FL (ref 82–98)
MONOCYTES # BLD AUTO: 0.65 THOUSAND/ΜL (ref 0.17–1.22)
MONOCYTES NFR BLD AUTO: 6 % (ref 4–12)
NEUTROPHILS # BLD AUTO: 10.07 THOUSANDS/ΜL (ref 1.85–7.62)
NEUTS SEG NFR BLD AUTO: 86 % (ref 43–75)
NRBC BLD AUTO-RTO: 0 /100 WBCS
PLATELET # BLD AUTO: 242 THOUSANDS/UL (ref 149–390)
PMV BLD AUTO: 11.2 FL (ref 8.9–12.7)
POTASSIUM SERPL-SCNC: 4.1 MMOL/L (ref 3.5–5.3)
PROT SERPL-MCNC: 8.5 G/DL (ref 6.4–8.2)
RBC # BLD AUTO: 6.02 MILLION/UL (ref 3.88–5.62)
SODIUM SERPL-SCNC: 141 MMOL/L (ref 136–145)
WBC # BLD AUTO: 11.71 THOUSAND/UL (ref 4.31–10.16)

## 2019-11-15 PROCEDURE — 36415 COLL VENOUS BLD VENIPUNCTURE: CPT

## 2019-11-15 PROCEDURE — 83690 ASSAY OF LIPASE: CPT

## 2019-11-15 PROCEDURE — 85025 COMPLETE CBC W/AUTO DIFF WBC: CPT

## 2019-11-15 PROCEDURE — 80053 COMPREHEN METABOLIC PANEL: CPT

## 2019-11-15 RX ORDER — ONDANSETRON 4 MG/1
4 TABLET, ORALLY DISINTEGRATING ORAL ONCE
Status: COMPLETED | OUTPATIENT
Start: 2019-11-15 | End: 2019-11-15

## 2019-11-15 RX ADMIN — ONDANSETRON 4 MG: 4 TABLET, ORALLY DISINTEGRATING ORAL at 12:32

## 2020-01-09 ENCOUNTER — OFFICE VISIT (OUTPATIENT)
Dept: FAMILY MEDICINE CLINIC | Facility: CLINIC | Age: 25
End: 2020-01-09
Payer: COMMERCIAL

## 2020-01-09 VITALS
WEIGHT: 224.6 LBS | HEIGHT: 72 IN | OXYGEN SATURATION: 95 % | HEART RATE: 67 BPM | BODY MASS INDEX: 30.42 KG/M2 | DIASTOLIC BLOOD PRESSURE: 80 MMHG | SYSTOLIC BLOOD PRESSURE: 110 MMHG

## 2020-01-09 DIAGNOSIS — M54.9 BACK PAIN, UNSPECIFIED BACK LOCATION, UNSPECIFIED BACK PAIN LATERALITY, UNSPECIFIED CHRONICITY: Primary | ICD-10-CM

## 2020-01-09 DIAGNOSIS — Z11.4 SCREENING FOR HIV (HUMAN IMMUNODEFICIENCY VIRUS): ICD-10-CM

## 2020-01-09 DIAGNOSIS — D72.829 LEUKOCYTOSIS, UNSPECIFIED TYPE: ICD-10-CM

## 2020-01-09 LAB
SL AMB  POCT GLUCOSE, UA: NORMAL
SL AMB LEUKOCYTE ESTERASE,UA: NORMAL
SL AMB POCT BILIRUBIN,UA: NORMAL
SL AMB POCT BLOOD,UA: NORMAL
SL AMB POCT CLARITY,UA: CLEAR
SL AMB POCT COLOR,UA: YELLOW
SL AMB POCT KETONES,UA: NORMAL
SL AMB POCT NITRITE,UA: NORMAL
SL AMB POCT PH,UA: 8
SL AMB POCT SPECIFIC GRAVITY,UA: 1.01
SL AMB POCT URINE PROTEIN: NORMAL
SL AMB POCT UROBILINOGEN: 0.2

## 2020-01-09 PROCEDURE — 99214 OFFICE O/P EST MOD 30 MIN: CPT | Performed by: NURSE PRACTITIONER

## 2020-01-09 PROCEDURE — 3008F BODY MASS INDEX DOCD: CPT | Performed by: NURSE PRACTITIONER

## 2020-01-09 PROCEDURE — 81002 URINALYSIS NONAUTO W/O SCOPE: CPT | Performed by: NURSE PRACTITIONER

## 2020-01-09 RX ORDER — OMEPRAZOLE 20 MG/1
20 CAPSULE, DELAYED RELEASE ORAL DAILY
Qty: 30 CAPSULE | Refills: 0 | Status: SHIPPED | OUTPATIENT
Start: 2020-01-09 | End: 2022-04-26

## 2020-01-09 RX ORDER — HYDROCODONE BITARTRATE AND ACETAMINOPHEN 5; 325 MG/1; MG/1
1 TABLET ORAL EVERY 6 HOURS PRN
COMMUNITY
Start: 2019-12-10 | End: 2022-04-12

## 2020-01-09 RX ORDER — NAPROXEN 500 MG/1
500 TABLET ORAL 2 TIMES DAILY
COMMUNITY
Start: 2019-10-21 | End: 2020-01-09 | Stop reason: SDUPTHER

## 2020-01-09 RX ORDER — MELOXICAM 15 MG/1
15 TABLET ORAL DAILY
COMMUNITY
Start: 2019-12-10 | End: 2020-01-09

## 2020-01-09 RX ORDER — NAPROXEN 500 MG/1
500 TABLET ORAL 2 TIMES DAILY
Qty: 30 TABLET | Refills: 0 | Status: SHIPPED | OUTPATIENT
Start: 2020-01-09 | End: 2022-04-12

## 2020-01-09 RX ORDER — OMEPRAZOLE 20 MG/1
20 CAPSULE, DELAYED RELEASE ORAL DAILY
COMMUNITY
End: 2020-01-09 | Stop reason: SDUPTHER

## 2020-01-09 NOTE — ASSESSMENT & PLAN NOTE
Pt recommended continue use of Naproxen to help decrease pain with Omeprazole to avoid GI upset  Pt to continue keeping active and performing some light exercises such as walking and strengthening muscles  Advised to avoid heavy lifting and use of heat to back for 20 to 30 minutes every 2 hours  If symptoms do not improve within 4 to 6 weeks pt advised to start return to clinic and possibly start Physical therapy      -POCT urine normal

## 2020-01-09 NOTE — PROGRESS NOTES
Assessment/Plan:    Back pain  Pt recommended continue use of Naproxen to help decrease pain with Omeprazole to avoid GI upset  Pt to continue keeping active and performing some light exercises such as walking and strengthening muscles  Advised to avoid heavy lifting and use of heat to back for 20 to 30 minutes every 2 hours  If symptoms do not improve within 4 to 6 weeks pt advised to start return to clinic and possibly start Physical therapy  -POCT urine normal     Leukocytosis  -Repeating CBC       Diagnoses and all orders for this visit:    Back pain, unspecified back location, unspecified back pain laterality, unspecified chronicity  -     POCT urine dip  -     naproxen (NAPROSYN) 500 mg tablet; Take 1 tablet (500 mg total) by mouth 2 (two) times a day  -     omeprazole (PriLOSEC) 20 mg delayed release capsule; Take 1 capsule (20 mg total) by mouth daily  -     Ambulatory referral to Physical Therapy; Future    Leukocytosis, unspecified type  -     Cancel: CBC and differential; Future    Screening for HIV (human immunodeficiency virus)  -     HIV 1/2 AG-AB combo; Future    Other orders  -     Discontinue: omeprazole (PriLOSEC) 20 mg delayed release capsule; Take 20 mg by mouth daily  -     Cancel: Urine culture; Future          Subjective:      Patient ID: Sherif Cabral is a 22 y o  male  22year old male patient presents today for back pain  Denies any injuries to his back  Does work at [employer]  Currently not working due to injury to his shoulder of which he is getting PT for  Back Pain   This is a new problem  The current episode started in the past 7 days  The problem occurs constantly  The problem is unchanged  The pain is present in the lumbar spine  The quality of the pain is described as aching  The pain radiates to the left foot, left thigh and left knee  The pain is at a severity of 8/10  The pain is severe  The pain is the same all the time   The symptoms are aggravated by lying down, bending, standing and sitting  Stiffness is present all day  Pertinent negatives include no bladder incontinence, bowel incontinence, chest pain, dysuria, fever, headaches, leg pain, paresthesias, tingling or weakness  Risk factors include sedentary lifestyle, poor posture, lack of exercise and obesity  He has tried NSAIDs for the symptoms  The treatment provided mild relief  The following portions of the patient's history were reviewed and updated as appropriate: allergies, current medications, past family history, past medical history, past social history, past surgical history and problem list     Review of Systems   Constitutional: Negative  Negative for appetite change, fatigue and fever  HENT: Negative  Eyes: Negative  Respiratory: Negative  Negative for cough, chest tightness, shortness of breath and wheezing  Cardiovascular: Negative  Negative for chest pain and palpitations  Gastrointestinal: Negative  Negative for bowel incontinence  Endocrine: Negative  Genitourinary: Negative  Negative for bladder incontinence, decreased urine volume, discharge, dysuria, flank pain, scrotal swelling and testicular pain  Musculoskeletal: Positive for back pain  Negative for arthralgias, gait problem, myalgias, neck pain and neck stiffness  Skin: Negative  Negative for color change and rash  Allergic/Immunologic: Negative  Neurological: Negative  Negative for dizziness, tingling, weakness, headaches and paresthesias  Hematological: Negative  Negative for adenopathy  Does not bruise/bleed easily  Psychiatric/Behavioral: Negative  Objective:      /80 (BP Location: Left arm, Patient Position: Sitting, Cuff Size: Adult)   Pulse 67   Ht 6' (1 829 m)   Wt 102 kg (224 lb 9 6 oz)   SpO2 95%   BMI 30 46 kg/m²          Physical Exam   Constitutional: He is oriented to person, place, and time  He appears well-developed and well-nourished   No distress  HENT:   Head: Normocephalic and atraumatic  Right Ear: External ear normal    Left Ear: External ear normal    Eyes: Pupils are equal, round, and reactive to light  EOM are normal    Neck: Normal range of motion  Neck supple  Cardiovascular: Normal rate, regular rhythm, normal heart sounds and intact distal pulses  Exam reveals no gallop and no friction rub  No murmur heard  Pulmonary/Chest: Effort normal and breath sounds normal  No respiratory distress  He has no wheezes  Abdominal: Soft  Bowel sounds are normal    Musculoskeletal: He exhibits tenderness  Lumbar back: He exhibits decreased range of motion, tenderness and pain  He exhibits no swelling and no edema  Lymphadenopathy:     He has no cervical adenopathy  Neurological: He is alert and oriented to person, place, and time  Skin: Skin is warm and dry  Capillary refill takes less than 2 seconds  He is not diaphoretic  Psychiatric: He has a normal mood and affect  Thought content normal    Nursing note and vitals reviewed

## 2020-02-02 ENCOUNTER — HOSPITAL ENCOUNTER (EMERGENCY)
Facility: HOSPITAL | Age: 25
Discharge: HOME | End: 2020-02-02
Attending: EMERGENCY MEDICINE
Payer: COMMERCIAL

## 2020-02-02 VITALS
TEMPERATURE: 97.8 F | BODY MASS INDEX: 31.13 KG/M2 | SYSTOLIC BLOOD PRESSURE: 162 MMHG | HEART RATE: 63 BPM | DIASTOLIC BLOOD PRESSURE: 83 MMHG | RESPIRATION RATE: 16 BRPM | WEIGHT: 229.5 LBS | OXYGEN SATURATION: 98 %

## 2020-02-02 DIAGNOSIS — R11.10 VOMITING AND DIARRHEA: Primary | ICD-10-CM

## 2020-02-02 DIAGNOSIS — R19.7 VOMITING AND DIARRHEA: Primary | ICD-10-CM

## 2020-02-02 LAB
ALBUMIN SERPL BCP-MCNC: 3.4 G/DL (ref 3.5–5)
ALP SERPL-CCNC: 56 U/L (ref 46–116)
ALT SERPL W P-5'-P-CCNC: 41 U/L (ref 12–78)
ANION GAP SERPL CALCULATED.3IONS-SCNC: 5 MMOL/L (ref 4–13)
AST SERPL W P-5'-P-CCNC: 19 U/L (ref 5–45)
BASOPHILS # BLD AUTO: 0.04 THOUSANDS/ΜL (ref 0–0.1)
BASOPHILS NFR BLD AUTO: 1 % (ref 0–1)
BILIRUB SERPL-MCNC: 0.56 MG/DL (ref 0.2–1)
BUN SERPL-MCNC: 11 MG/DL (ref 5–25)
CALCIUM SERPL-MCNC: 8.9 MG/DL (ref 8.3–10.1)
CHLORIDE SERPL-SCNC: 102 MMOL/L (ref 100–108)
CO2 SERPL-SCNC: 32 MMOL/L (ref 21–32)
CREAT SERPL-MCNC: 1.07 MG/DL (ref 0.6–1.3)
EOSINOPHIL # BLD AUTO: 3.16 THOUSAND/ΜL (ref 0–0.61)
EOSINOPHIL NFR BLD AUTO: 36 % (ref 0–6)
ERYTHROCYTE [DISTWIDTH] IN BLOOD BY AUTOMATED COUNT: 12.9 % (ref 11.6–15.1)
GFR SERPL CREATININE-BSD FRML MDRD: 97 ML/MIN/1.73SQ M
GLUCOSE SERPL-MCNC: 85 MG/DL (ref 65–140)
HCT VFR BLD AUTO: 50.8 % (ref 36.5–49.3)
HGB BLD-MCNC: 16.6 G/DL (ref 12–17)
IMM GRANULOCYTES # BLD AUTO: 0.02 THOUSAND/UL (ref 0–0.2)
IMM GRANULOCYTES NFR BLD AUTO: 0 % (ref 0–2)
LIPASE SERPL-CCNC: 177 U/L (ref 73–393)
LYMPHOCYTES # BLD AUTO: 2.04 THOUSANDS/ΜL (ref 0.6–4.47)
LYMPHOCYTES NFR BLD AUTO: 23 % (ref 14–44)
MCH RBC QN AUTO: 31.1 PG (ref 26.8–34.3)
MCHC RBC AUTO-ENTMCNC: 32.7 G/DL (ref 31.4–37.4)
MCV RBC AUTO: 95 FL (ref 82–98)
MONOCYTES # BLD AUTO: 0.38 THOUSAND/ΜL (ref 0.17–1.22)
MONOCYTES NFR BLD AUTO: 4 % (ref 4–12)
NEUTROPHILS # BLD AUTO: 3.21 THOUSANDS/ΜL (ref 1.85–7.62)
NEUTS SEG NFR BLD AUTO: 36 % (ref 43–75)
NRBC BLD AUTO-RTO: 0 /100 WBCS
PLATELET # BLD AUTO: 204 THOUSANDS/UL (ref 149–390)
PMV BLD AUTO: 11 FL (ref 8.9–12.7)
POTASSIUM SERPL-SCNC: 3.8 MMOL/L (ref 3.5–5.3)
PROT SERPL-MCNC: 6.7 G/DL (ref 6.4–8.2)
RBC # BLD AUTO: 5.33 MILLION/UL (ref 3.88–5.62)
SODIUM SERPL-SCNC: 139 MMOL/L (ref 136–145)
WBC # BLD AUTO: 8.85 THOUSAND/UL (ref 4.31–10.16)

## 2020-02-02 PROCEDURE — 99284 EMERGENCY DEPT VISIT MOD MDM: CPT | Performed by: EMERGENCY MEDICINE

## 2020-02-02 PROCEDURE — 36415 COLL VENOUS BLD VENIPUNCTURE: CPT | Performed by: EMERGENCY MEDICINE

## 2020-02-02 PROCEDURE — 99284 EMERGENCY DEPT VISIT MOD MDM: CPT

## 2020-02-02 PROCEDURE — 80053 COMPREHEN METABOLIC PANEL: CPT | Performed by: EMERGENCY MEDICINE

## 2020-02-02 PROCEDURE — 96374 THER/PROPH/DIAG INJ IV PUSH: CPT

## 2020-02-02 PROCEDURE — 83690 ASSAY OF LIPASE: CPT | Performed by: EMERGENCY MEDICINE

## 2020-02-02 PROCEDURE — 85025 COMPLETE CBC W/AUTO DIFF WBC: CPT | Performed by: EMERGENCY MEDICINE

## 2020-02-02 PROCEDURE — 96361 HYDRATE IV INFUSION ADD-ON: CPT

## 2020-02-02 RX ORDER — ONDANSETRON 4 MG/1
4 TABLET, ORALLY DISINTEGRATING ORAL EVERY 8 HOURS PRN
Qty: 10 TABLET | Refills: 0 | Status: SHIPPED | OUTPATIENT
Start: 2020-02-02 | End: 2022-04-12

## 2020-02-02 RX ORDER — ONDANSETRON 2 MG/ML
4 INJECTION INTRAMUSCULAR; INTRAVENOUS ONCE
Status: COMPLETED | OUTPATIENT
Start: 2020-02-02 | End: 2020-02-02

## 2020-02-02 RX ORDER — DICYCLOMINE HCL 20 MG
20 TABLET ORAL ONCE
Status: COMPLETED | OUTPATIENT
Start: 2020-02-02 | End: 2020-02-02

## 2020-02-02 RX ORDER — DICYCLOMINE HCL 20 MG
20 TABLET ORAL EVERY 6 HOURS PRN
Qty: 15 TABLET | Refills: 0 | Status: SHIPPED | OUTPATIENT
Start: 2020-02-02 | End: 2022-04-12

## 2020-02-02 RX ADMIN — DICYCLOMINE HYDROCHLORIDE 20 MG: 20 TABLET ORAL at 18:33

## 2020-02-02 RX ADMIN — SODIUM CHLORIDE 1000 ML: 0.9 INJECTION, SOLUTION INTRAVENOUS at 18:32

## 2020-02-02 RX ADMIN — ONDANSETRON 4 MG: 2 INJECTION INTRAMUSCULAR; INTRAVENOUS at 18:32

## 2020-02-02 NOTE — ED PROVIDER NOTES
History  Chief Complaint   Patient presents with    Abdominal Pain     nausea, diarrhea, abdominal pain, dizziness  symptoms x4 days  A 23-year-old male with past medical history of gastritis; presents with vomiting, diarrhea and abdominal pain that has been ongoing for the past 4 days  Patient reports the vomiting has somewhat improved, last episode was yesterday  Patient does complain of persistent nausea at this time  Patient has a decreased appetite secondary to the nausea  Patient reports several episodes of diarrhea per day since onset  Abdominal pain is generalized in nature, described as a cramping sensation  Patient does also complain of a headache and dizziness  Patient denies fever, chills, chest pain, shortness of breath, dysuria, peripheral edema and rashes  Patient denies sick contacts  Patient has not taken anything for symptoms  Assessment and plan:  Vomiting and diarrhea, associated with abdominal pain  Mild upper abdominal tenderness appreciated  No peritoneal signs  Suspect symptoms are viral in nature  Will check basic labs for intra-abdominal pathology  Will treat symptomatically  History provided by:  Patient  Abdominal Pain   Associated symptoms: diarrhea, nausea and vomiting        Prior to Admission Medications   Prescriptions Last Dose Informant Patient Reported? Taking?    HYDROcodone-acetaminophen (NORCO) 5-325 mg per tablet   Yes No   Sig: Take 1 tablet by mouth every 6 (six) hours as needed   naproxen (NAPROSYN) 500 mg tablet   No No   Sig: Take 1 tablet (500 mg total) by mouth 2 (two) times a day   omeprazole (PriLOSEC) 20 mg delayed release capsule   No No   Sig: Take 1 capsule (20 mg total) by mouth daily      Facility-Administered Medications: None       Past Medical History:   Diagnosis Date    Duodenitis     Gastritis        Past Surgical History:   Procedure Laterality Date    ESOPHAGOGASTRODUODENOSCOPY  08/2018    duodenitis       Family History Problem Relation Age of Onset    Hypertension Mother      I have reviewed and agree with the history as documented  Social History     Tobacco Use    Smoking status: Current Some Day Smoker     Types: Pipe    Smokeless tobacco: Never Used    Tobacco comment: Hookah   Substance Use Topics    Alcohol use: Yes     Frequency: Monthly or less     Drinks per session: 1 or 2     Binge frequency: Never     Comment: occasionally    Drug use: No        Review of Systems   Gastrointestinal: Positive for abdominal pain, diarrhea, nausea and vomiting  Neurological: Positive for dizziness and headaches  All other systems reviewed and are negative        Physical Exam  Physical Exam  General Appearance: alert and oriented, nad, non toxic appearing  Skin:  Warm, dry, intact  HEENT: atraumatic, normocephalic  Neck: Supple, trachea midline  Cardiac: RRR; no murmurs, rub, gallops  Pulmonary: lungs CTAB; no wheezes, rales, rhonchi  Gastrointestinal: abdomen soft, mild upper abdominal tenderness, nondistended; no guarding or rebound tenderness; good bowel sounds, no mass or bruits  Extremities:  no pedal edema, 2+ pulses; no calf tenderness, no clubbing, no cyanosis  Neuro:  no focal motor or sensory deficits, CN 2-12 grossly intact  Psych:  Normal mood and affect, normal judgement and insight      Vital Signs  ED Triage Vitals [02/02/20 1743]   Temperature Pulse Respirations Blood Pressure SpO2   97 8 °F (36 6 °C) 63 16 162/83 98 %      Temp Source Heart Rate Source Patient Position - Orthostatic VS BP Location FiO2 (%)   Temporal Monitor Sitting Right arm --      Pain Score       --           Vitals:    02/02/20 1743   BP: 162/83   Pulse: 63   Patient Position - Orthostatic VS: Sitting         Visual Acuity      ED Medications  Medications   sodium chloride 0 9 % bolus 1,000 mL (0 mL Intravenous Stopped 2/2/20 1912)   ondansetron (ZOFRAN) injection 4 mg (4 mg Intravenous Given 2/2/20 1832)   dicyclomine (BENTYL) tablet 20 mg (20 mg Oral Given 2/2/20 1833)       Diagnostic Studies  Results Reviewed     Procedure Component Value Units Date/Time    Comprehensive metabolic panel [[id number]]  (Abnormal) Collected:  02/02/20 1832    Lab Status:  Final result Specimen:  Blood from Arm, Right Updated:  02/02/20 1859     Sodium 139 mmol/L      Potassium 3 8 mmol/L      Chloride 102 mmol/L      CO2 32 mmol/L      ANION GAP 5 mmol/L      BUN 11 mg/dL      Creatinine 1 07 mg/dL      Glucose 85 mg/dL      Calcium 8 9 mg/dL      AST 19 U/L      ALT 41 U/L      Alkaline Phosphatase 56 U/L      Total Protein 6 7 g/dL      Albumin 3 4 g/dL      Total Bilirubin 0 56 mg/dL      eGFR 97 ml/min/1 73sq m     Narrative:       National Kid[employer] Foundation guidelines for Chronic Kidney Disease (CKD):     Stage 1 with normal or high GFR (GFR > 90 mL/min/1 73 square meters)    Stage 2 Mild CKD (GFR = 60-89 mL/min/1 73 square meters)    Stage 3A Moderate CKD (GFR = 45-59 mL/min/1 73 square meters)    Stage 3B Moderate CKD (GFR = 30-44 mL/min/1 73 square meters)    Stage 4 Severe CKD (GFR = 15-29 mL/min/1 73 square meters)    Stage 5 End Stage CKD (GFR <15 mL/min/1 73 square meters)  Note: GFR calculation is accurate only with a steady state creatinine    Lipase [[id number]]  (Normal) Collected:  02/02/20 1832    Lab Status:  Final result Specimen:  Blood from Arm, Right Updated:  02/02/20 1859     Lipase 177 u/L     CBC and differential [[id number]]  (Abnormal) Collected:  02/02/20 1832    Lab Status:  Final result Specimen:  Blood from Arm, Right Updated:  02/02/20 1840     WBC 8 85 Thousand/uL      RBC 5 33 Million/uL      Hemoglobin 16 6 g/dL      Hematocrit 50 8 %      MCV 95 fL      MCH 31 1 pg      MCHC 32 7 g/dL      RDW 12 9 %      MPV 11 0 fL      Platelets 353 Thousands/uL      nRBC 0 /100 WBCs      Neutrophils Relative 36 %      Immat GRANS % 0 %      Lymphocytes Relative 23 %      Monocytes Relative 4 %      Eosinophils Relative 36 % Basophils Relative 1 %      Neutrophils Absolute 3 21 Thousands/µL      Immature Grans Absolute 0 02 Thousand/uL      Lymphocytes Absolute 2 04 Thousands/µL      Monocytes Absolute 0 38 Thousand/µL      Eosinophils Absolute 3 16 Thousand/µL      Basophils Absolute 0 04 Thousands/µL                  No orders to display              Procedures  Procedures         ED Course  ED Course as of Feb 02 2115   Sena Lua Feb 02, 2020   1908 Labs within normal limits      1912 Patient reports overall improvement in symptoms  Patient does complain of persistent dizziness, although improved from arrival   Patient able to ambulate to the bathroom without difficulty  Suspect symptoms are viral in nature  Will proceed with discharge home, recommending increased PO hydration  MDM      Disposition  Final diagnoses:   Vomiting and diarrhea     Time reflects when diagnosis was documented in both MDM as applicable and the Disposition within this note     Time User Action Codes Description Comment    2/2/2020  7:13 PM Yeimi Roman Add [R11 10,  R19 7] Vomiting and diarrhea       ED Disposition     ED Disposition Condition Date/Time Comment    Discharge Stable Sun Feb 2, 2020  7:13 PM Fernanda Gary discharge to home/self care              Follow-up Information     Follow up With Specialties Details Why Contact Info    James Trejo MD Family Medicine Schedule an appointment as soon as possible for a visit in 2 days For re-evaluation [address]  [address] [facility]  [address]  [phone] [place] [address]            Discharge Medication List as of 2/2/2020  7:14 PM      START taking these medications    Details   dicyclomine (BENTYL) 20 mg tablet Take 1 tablet (20 mg total) by mouth every 6 (six) hours as needed (diarrhea, abdominal cramps), Starting Sun 2/2/2020, Print      ondansetron (ZOFRAN-ODT) 4 mg disintegrating tablet Take 1 tablet (4 mg total) by mouth every 8 (eight) hours as needed for nausea, Starting Sun 2/2/2020, Print         CONTINUE these medications which have NOT CHANGED    Details   HYDROcodone-acetaminophen (NORCO) 5-325 mg per tablet Take 1 tablet by mouth every 6 (six) hours as needed, Starting Tue 12/10/2019, Historical Med      naproxen (NAPROSYN) 500 mg tablet Take 1 tablet (500 mg total) by mouth 2 (two) times a day, Starting u 1/9/2020, Normal      omeprazole (PriLOSEC) 20 mg delayed release capsule Take 1 capsule (20 mg total) by mouth daily, Starting u 1/9/2020, Normal           No discharge procedures on file      ED Provider  Electronically Signed by           Milli Yanez DO  02/02/20 6528

## 2020-07-24 ENCOUNTER — OFFICE VISIT (OUTPATIENT)
Dept: URGENT CARE | Facility: MEDICAL CENTER | Age: 25
End: 2020-07-24

## 2020-07-24 VITALS — RESPIRATION RATE: 18 BRPM | TEMPERATURE: 97.6 F | OXYGEN SATURATION: 98 % | HEART RATE: 78 BPM

## 2020-07-24 DIAGNOSIS — Z11.59 SPECIAL SCREENING EXAMINATION FOR UNSPECIFIED VIRAL DISEASE: Primary | ICD-10-CM

## 2020-07-24 PROCEDURE — U0003 INFECTIOUS AGENT DETECTION BY NUCLEIC ACID (DNA OR RNA); SEVERE ACUTE RESPIRATORY SYNDROME CORONAVIRUS 2 (SARS-COV-2) (CORONAVIRUS DISEASE [COVID-19]), AMPLIFIED PROBE TECHNIQUE, MAKING USE OF HIGH THROUGHPUT TECHNOLOGIES AS DESCRIBED BY CMS-2020-01-R: HCPCS | Performed by: PHYSICIAN ASSISTANT

## 2020-07-24 PROCEDURE — G0382 LEV 3 HOSP TYPE B ED VISIT: HCPCS | Performed by: PHYSICIAN ASSISTANT

## 2020-07-24 NOTE — PATIENT INSTRUCTIONS

## 2020-07-24 NOTE — PROGRESS NOTES
[facility] Now        NAME: Guanaco Hanley is a 22 y o  male  : 1995    MRN: [id number]  DATE: 2020  TIME: 7:46 PM    Assessment and Plan   Special screening examination for unspecified viral disease [Z11 59]  1  Special screening examination for unspecified viral disease  MISC COVID-19 TEST- Office Collection         Patient Instructions     Curbside testing for COVID-19 was performed  Name and date of birth were confirmed before testing  Patient was advised to remain in isolation until test results return  Isolation guidelines were given to him with his discharge summary  Advised test results can take anywhere between 7-10 days to return  Will call with negative or positive results  Proceed to  ER if symptoms worsen  Chief Complaint     Chief Complaint   Patient presents with    COVID-19     Patient is Turkish speaking states "I am here for COVID testing" States started with a headache and nasal congestion 2 - 3 days  "I visited a cousin's house on  who tested positive" Denies shortness of breath and chest pain  Unsure of fever  Denies cough  History of Present Illness       Patient is a 41-year-old male presenting for COVID-19 testing  Patient states he was at his cousin's house last week who just tested positive for coronavirus  Patient states he does not have any symptoms currently except for headache and some nasal congestion  Patient denies chest pain, shortness of breath, cough, fevers, chills, anosmia, ageusia, GI upset  Patient denies recent travel  Patient states I just want to get tested to be sure   Review of Systems   Review of Systems   Constitutional: Negative for activity change, appetite change, chills, diaphoresis, fatigue and fever  HENT: Positive for congestion  Negative for ear discharge, ear pain, hearing loss, postnasal drip, rhinorrhea, sinus pressure, sinus pain, sore throat and trouble swallowing      Eyes: Negative for pain, discharge, redness and itching  Respiratory: Negative for cough, chest tightness, shortness of breath, wheezing and stridor  Cardiovascular: Negative for chest pain, palpitations and leg swelling  Gastrointestinal: Negative for abdominal distention, abdominal pain, diarrhea, nausea and vomiting  Musculoskeletal: Negative for arthralgias, myalgias, neck pain and neck stiffness  Skin: Negative for color change, pallor and rash  Neurological: Positive for headaches  Negative for dizziness, syncope, weakness, light-headedness and numbness           Current Medications       Current Outpatient Medications:     omeprazole (PriLOSEC) 20 mg delayed release capsule, Take 1 capsule (20 mg total) by mouth daily, Disp: 30 capsule, Rfl: 0    dicyclomine (BENTYL) 20 mg tablet, Take 1 tablet (20 mg total) by mouth every 6 (six) hours as needed (diarrhea, abdominal cramps) (Patient not taking: Reported on 7/24/2020), Disp: 15 tablet, Rfl: 0    HYDROcodone-acetaminophen (NORCO) 5-325 mg per tablet, Take 1 tablet by mouth every 6 (six) hours as needed, Disp: , Rfl:     naproxen (NAPROSYN) 500 mg tablet, Take 1 tablet (500 mg total) by mouth 2 (two) times a day (Patient not taking: Reported on 7/24/2020), Disp: 30 tablet, Rfl: 0    ondansetron (ZOFRAN-ODT) 4 mg disintegrating tablet, Take 1 tablet (4 mg total) by mouth every 8 (eight) hours as needed for nausea (Patient not taking: Reported on 7/24/2020), Disp: 10 tablet, Rfl: 0    Current Allergies     Allergies as of 07/24/2020 - Reviewed 07/24/2020   Allergen Reaction Noted    Aspirin Rash 04/02/2017    Cyclobenzaprine Rash 05/02/2018            The following portions of the patient's history were reviewed and updated as appropriate: allergies, current medications, past family history, past medical history, past social history, past surgical history and problem list      Past Medical History:   Diagnosis Date    Duodenitis     Gastritis        Past Surgical History:   Procedure Laterality Date    ESOPHAGOGASTRODUODENOSCOPY  08/2018    duodenitis       Family History   Problem Relation Age of Onset    Hypertension Mother          Medications have been verified  Objective   Pulse 78   Temp 97 6 °F (36 4 °C)   Resp 18   SpO2 98%        Physical Exam     Physical Exam   Constitutional: He is oriented to person, place, and time  He appears well-developed and well-nourished  No distress  Cardiovascular: Normal rate, regular rhythm and normal heart sounds  No murmur heard  Pulmonary/Chest: Effort normal and breath sounds normal  No stridor  No respiratory distress  He has no wheezes  Neurological: He is alert and oriented to person, place, and time  Skin: He is not diaphoretic  Psychiatric: He has a normal mood and affect  His behavior is normal    Nursing note and vitals reviewed

## 2020-07-27 LAB — SARS-COV-2 RNA SPEC QL NAA+PROBE: NOT DETECTED

## 2020-07-28 ENCOUNTER — TELEPHONE (OUTPATIENT)
Dept: URGENT CARE | Facility: MEDICAL CENTER | Age: 25
End: 2020-07-28

## 2020-07-28 NOTE — TELEPHONE ENCOUNTER
Spoke with Fernanda  Informed him of his negative COVID-19 test results  Patient had no further questions or concerns  Verbalized understanding

## 2021-09-08 ENCOUNTER — HOSPITAL ENCOUNTER (EMERGENCY)
Facility: HOSPITAL | Age: 26
Discharge: HOME | End: 2021-09-08
Attending: EMERGENCY MEDICINE

## 2021-09-08 VITALS
BODY MASS INDEX: 31.19 KG/M2 | SYSTOLIC BLOOD PRESSURE: 137 MMHG | DIASTOLIC BLOOD PRESSURE: 85 MMHG | OXYGEN SATURATION: 100 % | RESPIRATION RATE: 18 BRPM | WEIGHT: 229.94 LBS | TEMPERATURE: 97.8 F | HEART RATE: 73 BPM

## 2021-09-08 DIAGNOSIS — Z20.822 SUSPECTED COVID-19 VIRUS INFECTION: Primary | ICD-10-CM

## 2021-09-08 LAB
FLUAV RNA RESP QL NAA+PROBE: NEGATIVE
FLUBV RNA RESP QL NAA+PROBE: NEGATIVE
RSV RNA RESP QL NAA+PROBE: POSITIVE
SARS-COV-2 RNA RESP QL NAA+PROBE: NEGATIVE

## 2021-09-08 PROCEDURE — 0241U HB NFCT DS VIR RESP RNA 4 TRGT: CPT | Performed by: PHYSICIAN ASSISTANT

## 2021-09-08 PROCEDURE — 99282 EMERGENCY DEPT VISIT SF MDM: CPT | Performed by: PHYSICIAN ASSISTANT

## 2021-09-08 PROCEDURE — 99282 EMERGENCY DEPT VISIT SF MDM: CPT

## 2021-09-08 NOTE — ED PROVIDER NOTES
History  Chief Complaint   Patient presents with    Labs Only     c/o sore throat, body aches, diarrhea, chills, headache for the past 4 days  Would like to be tested for covid  URI  Presenting symptoms: congestion, cough, fever, rhinorrhea and sore throat    Presenting symptoms: no ear pain    Severity:  Moderate  Duration:  4 days  Timing:  Constant  Progression:  Unchanged  Relieved by:  Nothing  Worsened by:  Nothing  Ineffective treatments:  OTC medications  Associated symptoms: headaches, sinus pain and swollen glands    Associated symptoms: no arthralgias        Prior to Admission Medications   Prescriptions Last Dose Informant Patient Reported? Taking? HYDROcodone-acetaminophen (NORCO) 5-325 mg per tablet   Yes No   Sig: Take 1 tablet by mouth every 6 (six) hours as needed   dicyclomine (BENTYL) 20 mg tablet   No No   Sig: Take 1 tablet (20 mg total) by mouth every 6 (six) hours as needed (diarrhea, abdominal cramps)   Patient not taking: Reported on 7/24/2020   naproxen (NAPROSYN) 500 mg tablet   No No   Sig: Take 1 tablet (500 mg total) by mouth 2 (two) times a day   Patient not taking: Reported on 7/24/2020   omeprazole (PriLOSEC) 20 mg delayed release capsule   No No   Sig: Take 1 capsule (20 mg total) by mouth daily   ondansetron (ZOFRAN-ODT) 4 mg disintegrating tablet   No No   Sig: Take 1 tablet (4 mg total) by mouth every 8 (eight) hours as needed for nausea   Patient not taking: Reported on 7/24/2020      Facility-Administered Medications: None       Past Medical History:   Diagnosis Date    Duodenitis     Gastritis        Past Surgical History:   Procedure Laterality Date    ESOPHAGOGASTRODUODENOSCOPY  08/2018    duodenitis       Family History   Problem Relation Age of Onset    Hypertension Mother      I have reviewed and agree with the history as documented      E-Cigarette/Vaping     E-Cigarette/Vaping Substances     Social History     Tobacco Use    Smoking status: Current Some Day Smoker     Types: Pipe    Smokeless tobacco: Never Used    Tobacco comment: Hookah   Substance Use Topics    Alcohol use: Yes     Comment: occasionally    Drug use: No       Review of Systems   Constitutional: Positive for fever  Negative for chills  HENT: Positive for congestion, rhinorrhea, sinus pain and sore throat  Negative for ear pain  Eyes: Negative for pain and visual disturbance  Respiratory: Positive for cough  Negative for shortness of breath  Cardiovascular: Negative for chest pain and palpitations  Gastrointestinal: Negative for abdominal pain and vomiting  Genitourinary: Negative for dysuria and hematuria  Musculoskeletal: Negative for arthralgias and back pain  Skin: Negative for color change and rash  Neurological: Positive for headaches  Negative for seizures and syncope  All other systems reviewed and are negative  Physical Exam  Physical Exam  Vitals and nursing note reviewed  Constitutional:       General: He is not in acute distress  Appearance: Normal appearance  He is not ill-appearing, toxic-appearing or diaphoretic  HENT:      Head: Normocephalic and atraumatic  Nose: Mucosal edema and congestion present  No nasal deformity  Right Turbinates: Not swollen  Left Turbinates: Swollen  Right Sinus: Maxillary sinus tenderness and frontal sinus tenderness present  Left Sinus: Maxillary sinus tenderness and frontal sinus tenderness present  Mouth/Throat:      Mouth: Mucous membranes are moist    Eyes:      General: No scleral icterus  Pupils: Pupils are equal, round, and reactive to light  Cardiovascular:      Rate and Rhythm: Normal rate and regular rhythm  Pulses: Normal pulses  Heart sounds: Normal heart sounds  Pulmonary:      Effort: Pulmonary effort is normal       Breath sounds: Normal breath sounds  Abdominal:      General: Abdomen is flat  There is no distension  Palpations: Abdomen is soft  Tenderness: There is no abdominal tenderness  There is no guarding or rebound  Hernia: No hernia is present  Musculoskeletal:         General: No swelling or tenderness  Normal range of motion  Cervical back: Normal range of motion  Skin:     General: Skin is warm  Capillary Refill: Capillary refill takes less than 2 seconds  Neurological:      General: No focal deficit present  Mental Status: He is alert  Psychiatric:         Mood and Affect: Mood normal          Vital Signs  ED Triage Vitals [09/08/21 1100]   Temperature Pulse Respirations Blood Pressure SpO2   97 8 °F (36 6 °C) 73 18 137/85 100 %      Temp Source Heart Rate Source Patient Position - Orthostatic VS BP Location FiO2 (%)   Oral Monitor Sitting Right arm --      Pain Score       --           Vitals:    09/08/21 1100   BP: 137/85   Pulse: 73   Patient Position - Orthostatic VS: Sitting         Visual Acuity      ED Medications  Medications - No data to display    Diagnostic Studies  Results Reviewed     Procedure Component Value Units Date/Time    COVID19, Influenza A/B, RSV PCR, [facility] [[id number]]  (Abnormal) Collected: 09/08/21 1143    Lab Status: Final result Specimen: Nares from Nasopharyngeal Swab Updated: 09/08/21 1329     SARS-CoV-2 Negative     INFLUENZA A PCR Negative     INFLUENZA B PCR Negative     RSV PCR Positive    Narrative: This test has been authorized by FDA under an EUA (Emergency Use Assay) for use by authorized laboratories  Clinical caution and judgement should be used with the interpretation of these results with consideration of the clinical impression and other laboratory testing  Testing reported as "Positive" or "Negative" has been proven to be accurate according to standard laboratory validation requirements  All testing is performed with control materials showing appropriate reactivity at standard intervals                   No orders to display              Procedures  Procedures ED Course                                           MDM  Number of Diagnoses or Management Options  Suspected COVID-19 virus infection: new and requires workup     Amount and/or Complexity of Data Reviewed  Clinical lab tests: ordered and reviewed    Risk of Complications, Morbidity, and/or Mortality  Presenting problems: moderate  Diagnostic procedures: moderate  Management options: low    Patient Progress  Patient progress: stable      Disposition  Final diagnoses:   Suspected COVID-19 virus infection     Time reflects when diagnosis was documented in both MDM as applicable and the Disposition within this note     Time User Action Codes Description Comment    9/8/2021 11:42 AM Rusty Miranda Add [Z20 822] Suspected COVID-19 virus infection       ED Disposition     ED Disposition Condition Date/Time Comment    Discharge Stable Wed Sep 8, 2021 11:42 AM Fernanda Welsh discharge to home/self care              Follow-up Information     Follow up With Specialties Details Why Contact Info       If symptoms worsen           Discharge Medication List as of 9/8/2021 11:44 AM      CONTINUE these medications which have NOT CHANGED    Details   dicyclomine (BENTYL) 20 mg tablet Take 1 tablet (20 mg total) by mouth every 6 (six) hours as needed (diarrhea, abdominal cramps), Starting Sun 2/2/2020, Print      HYDROcodone-acetaminophen (NORCO) 5-325 mg per tablet Take 1 tablet by mouth every 6 (six) hours as needed, Starting Tue 12/10/2019, Historical Med      naproxen (NAPROSYN) 500 mg tablet Take 1 tablet (500 mg total) by mouth 2 (two) times a day, Starting u 1/9/2020, Normal      omeprazole (PriLOSEC) 20 mg delayed release capsule Take 1 capsule (20 mg total) by mouth daily, Starting Thu 1/9/2020, Normal      ondansetron (ZOFRAN-ODT) 4 mg disintegrating tablet Take 1 tablet (4 mg total) by mouth every 8 (eight) hours as needed for nausea, Starting Sun 2/2/2020, Print           No discharge procedures on file     PDMP Review     None          ED Provider  Electronically Signed by           Jose Chapman PA-C  09/09/21 0072

## 2021-09-08 NOTE — DISCHARGE INSTRUCTIONS
You need to self quarantine until you get your results back  If you are positive you will need to self quarantine until 9/13/21  Tylenol, motrin, delsym, mucinex as needed for symptoms

## 2022-03-22 ENCOUNTER — HOSPITAL ENCOUNTER (EMERGENCY)
Facility: HOSPITAL | Age: 27
Discharge: HOME | End: 2022-03-22
Attending: EMERGENCY MEDICINE | Admitting: EMERGENCY MEDICINE
Payer: COMMERCIAL

## 2022-03-22 ENCOUNTER — APPOINTMENT (EMERGENCY)
Dept: RADIOLOGY | Facility: HOSPITAL | Age: 27
End: 2022-03-22
Payer: COMMERCIAL

## 2022-03-22 VITALS
SYSTOLIC BLOOD PRESSURE: 113 MMHG | BODY MASS INDEX: 30.91 KG/M2 | HEIGHT: 72 IN | OXYGEN SATURATION: 99 % | WEIGHT: 228.18 LBS | HEART RATE: 82 BPM | TEMPERATURE: 98 F | DIASTOLIC BLOOD PRESSURE: 70 MMHG | RESPIRATION RATE: 16 BRPM

## 2022-03-22 DIAGNOSIS — R68.89 FLU-LIKE SYMPTOMS: Primary | ICD-10-CM

## 2022-03-22 PROCEDURE — 71045 X-RAY EXAM CHEST 1 VIEW: CPT

## 2022-03-22 PROCEDURE — 99284 EMERGENCY DEPT VISIT MOD MDM: CPT | Performed by: PHYSICIAN ASSISTANT

## 2022-03-22 PROCEDURE — 99283 EMERGENCY DEPT VISIT LOW MDM: CPT

## 2022-03-22 PROCEDURE — 87636 SARSCOV2 & INF A&B AMP PRB: CPT | Performed by: PHYSICIAN ASSISTANT

## 2022-03-22 RX ORDER — DEXTROMETHORPHAN HYDROBROMIDE AND PROMETHAZINE HYDROCHLORIDE 15; 6.25 MG/5ML; MG/5ML
5 SOLUTION ORAL 4 TIMES DAILY PRN
Qty: 118 ML | Refills: 0 | Status: SHIPPED | OUTPATIENT
Start: 2022-03-22 | End: 2022-03-29

## 2022-03-22 NOTE — ED PROVIDER NOTES
History  Chief Complaint   Patient presents with    Cold Like Symptoms     pt report, body aches, sore throat, cough, nasal congestion started thursday  This is a 52-year-old male patient with had flu-like symptoms body aches, sore throat, nonproductive cough nasal congestion does notice 60s  The cough keeps him up at night  There is no shortness of breath no tachycardia no hypoxia  No pleuritic pain  Nothing makes better or worse she tried nothing over-the-counter  Patient nontoxic in no acute distress  No one else in the house has this  Denies any fever chills headache blurred vision double vision  His throat only hurts when he coughs  No chest pain no shortness with the nausea vomiting diarrhea abdominal pain no stiff neck  No urinary symptoms  Differential diagnosis includes not limited to cold, influenza, bronchitis, pneumonia          Prior to Admission Medications   Prescriptions Last Dose Informant Patient Reported? Taking?    HYDROcodone-acetaminophen (NORCO) 5-325 mg per tablet   Yes No   Sig: Take 1 tablet by mouth every 6 (six) hours as needed   dicyclomine (BENTYL) 20 mg tablet   No No   Sig: Take 1 tablet (20 mg total) by mouth every 6 (six) hours as needed (diarrhea, abdominal cramps)   Patient not taking: Reported on 7/24/2020   naproxen (NAPROSYN) 500 mg tablet   No No   Sig: Take 1 tablet (500 mg total) by mouth 2 (two) times a day   Patient not taking: Reported on 7/24/2020   omeprazole (PriLOSEC) 20 mg delayed release capsule   No No   Sig: Take 1 capsule (20 mg total) by mouth daily   ondansetron (ZOFRAN-ODT) 4 mg disintegrating tablet   No No   Sig: Take 1 tablet (4 mg total) by mouth every 8 (eight) hours as needed for nausea   Patient not taking: Reported on 7/24/2020      Facility-Administered Medications: None       Past Medical History:   Diagnosis Date    Duodenitis     Gastritis        Past Surgical History:   Procedure Laterality Date    ESOPHAGOGASTRODUODENOSCOPY 08/2018    duodenitis       Family History   Problem Relation Age of Onset    Hypertension Mother      I have reviewed and agree with the history as documented  E-Cigarette/Vaping     E-Cigarette/Vaping Substances     Social History     Tobacco Use    Smoking status: Current Some Day Smoker     Types: Pipe    Smokeless tobacco: Never Used    Tobacco comment: Hookah   Substance Use Topics    Alcohol use: Yes     Comment: occasionally    Drug use: No       Review of Systems   Constitutional: Negative for chills, diaphoresis, fatigue and fever  HENT: Positive for sore throat  Negative for congestion, ear pain, facial swelling, hearing loss, nosebleeds, postnasal drip, rhinorrhea, sinus pressure, sinus pain, sneezing, tinnitus, trouble swallowing and voice change  Eyes: Negative for photophobia, pain, discharge and visual disturbance  Respiratory: Positive for cough  Negative for choking, chest tightness, shortness of breath and wheezing  Cardiovascular: Negative for chest pain and palpitations  Gastrointestinal: Negative for abdominal distention, abdominal pain, diarrhea and vomiting  Genitourinary: Negative for dysuria, flank pain and frequency  Musculoskeletal: Positive for arthralgias and myalgias  Negative for back pain, gait problem and joint swelling  Skin: Negative for color change and rash  Neurological: Negative for dizziness, syncope and headaches  Psychiatric/Behavioral: Negative for behavioral problems and confusion  The patient is not nervous/anxious  All other systems reviewed and are negative  Physical Exam  Physical Exam  Vitals and nursing note reviewed  Constitutional:       General: He is not in acute distress  Appearance: Normal appearance  He is well-developed  He is not ill-appearing, toxic-appearing or diaphoretic  HENT:      Head: Normocephalic and atraumatic        Right Ear: Tympanic membrane, ear canal and external ear normal       Left Ear: Tympanic membrane, ear canal and external ear normal       Nose: Nose normal       Mouth/Throat:      Mouth: Mucous membranes are moist       Pharynx: Oropharynx is clear  No oropharyngeal exudate or posterior oropharyngeal erythema  Eyes:      General: No scleral icterus  Right eye: No discharge  Left eye: No discharge  Conjunctiva/sclera: Conjunctivae normal       Pupils: Pupils are equal, round, and reactive to light  Cardiovascular:      Rate and Rhythm: Normal rate and regular rhythm  Pulmonary:      Effort: Pulmonary effort is normal       Breath sounds: Normal breath sounds  Abdominal:      General: Bowel sounds are normal       Palpations: Abdomen is soft  Tenderness: There is no abdominal tenderness  Musculoskeletal:         General: Normal range of motion  Cervical back: Normal range of motion and neck supple  Right lower leg: No edema  Left lower leg: No edema  Skin:     General: Skin is warm  Capillary Refill: Capillary refill takes less than 2 seconds  Neurological:      General: No focal deficit present  Mental Status: He is alert and oriented to person, place, and time  Mental status is at baseline  Psychiatric:         Mood and Affect: Mood normal          Behavior: Behavior normal          Vital Signs  ED Triage Vitals [03/22/22 1350]   Temperature Pulse Respirations Blood Pressure SpO2   98 °F (36 7 °C) 82 16 113/70 99 %      Temp Source Heart Rate Source Patient Position - Orthostatic VS BP Location FiO2 (%)   Oral Monitor Sitting Right arm --      Pain Score       5           Vitals:    03/22/22 1350   BP: 113/70   Pulse: 82   Patient Position - Orthostatic VS: Sitting         Visual Acuity      ED Medications  Medications - No data to display    Diagnostic Studies  Results Reviewed     Procedure Component Value Units Date/Time    COVID/FLU - 24 hour TAT [[id number]] Collected: 03/22/22 1415    Lab Status:  In process Specimen: Nares from Nasopharyngeal Swab Updated: 03/22/22 1435                 XR chest 1 view portable    (Results Pending)              Procedures  Procedures         ED Course                                             MDM    Disposition  Final diagnoses:   Flu-like symptoms     Time reflects when diagnosis was documented in both MDM as applicable and the Disposition within this note     Time User Action Codes Description Comment    3/22/2022  2:34 PM Anastasia Souzasadiqandrew Add [R68 89] Flu-like symptoms       ED Disposition     ED Disposition Condition Date/Time Comment    Discharge Stable Tue Mar 22, 2022  2:34 PM Fernanda Terrazas discharge to home/self care  Follow-up Information     Follow up With Specialties Details Why [address] Schedule an appointment as soon as possible for a visit   Abran [address] [place] [address]  [facility] [address]             Patient's Medications   Discharge Prescriptions    PROMETHAZINE-DM (PHENERGAN-DM) 6 25-15 MG/5 ML ORAL SYRUP    Take 5 mL by mouth 4 (four) times a day as needed for cough for up to 7 days       Start Date: 3/22/2022 End Date: 3/29/2022       Order Dose: 5 mL       Quantity: 118 mL    Refills: 0       No discharge procedures on file      PDMP Review     None          ED Provider  Electronically Signed by           Kacey Verdugo PA-C  03/22/22 5344

## 2022-03-22 NOTE — Clinical Note
Fernanda Rainey was seen and treated in our emergency department on 3/22/2022  Diagnosis: The flu-like symptoms    Fernanda    He may return on this date: If you have any questions or concerns, please don't hesitate to call        Corie Gottron, PA-C    ______________________________           _______________          _______________  Hospital Representative                              Date                                Time

## 2022-03-22 NOTE — DISCHARGE INSTRUCTIONS
Please return with any worsening symptoms questions, concerns      To call you with results of COVID and influenza test

## 2022-03-23 LAB
FLUAV RNA RESP QL NAA+PROBE: NEGATIVE
FLUBV RNA RESP QL NAA+PROBE: NEGATIVE
SARS-COV-2 RNA RESP QL NAA+PROBE: NEGATIVE

## 2022-04-05 ENCOUNTER — NURSE TRIAGE (OUTPATIENT)
Dept: OTHER | Facility: OTHER | Age: 27
End: 2022-04-05

## 2022-04-06 NOTE — TELEPHONE ENCOUNTER
Reason for Disposition   MILD rectal bleeding (more than just a few drops or streaks)    Answer Assessment - Initial Assessment Questions  1  APPEARANCE of BLOOD: "What color is it?" "Is it passed separately, on the surface of the stool, or mixed in with the stool?"       Bright red, mixed in  2  AMOUNT: "How much blood was passed?"       Not too much  3  FREQUENCY: "How many times has blood been passed with the stools?"       Once  4  ONSET: "When was the blood first seen in the stools?" (Days or weeks)       Monday  5  DIARRHEA: "Is there also some diarrhea?" If Yes, ask: "How many diarrhea stools were passed in past 24 hours?"       Denies  6  CONSTIPATION: "Do you have constipation?" If Yes, ask: "How bad is it?"      Denies  7  RECURRENT SYMPTOMS: "Have you had blood in your stools before?" If Yes, ask: "When was the last time?" and "What happened that time?"       Once before  8  BLOOD THINNERS: "Do you take any blood thinners?" (e g , Coumadin/warfarin, Pradaxa/dabigatran, aspirin)      Denies  9   OTHER SYMPTOMS: "Do you have any other symptoms?"  (e g , abdominal pain, vomiting, dizziness, fever)      Abdominal pain, moderate    Protocols used: RECTAL BLEEDING-ADULT-AH

## 2022-04-12 ENCOUNTER — OFFICE VISIT (OUTPATIENT)
Dept: FAMILY MEDICINE CLINIC | Facility: CLINIC | Age: 27
End: 2022-04-12
Payer: COMMERCIAL

## 2022-04-12 VITALS
HEIGHT: 72 IN | OXYGEN SATURATION: 99 % | TEMPERATURE: 97.8 F | HEART RATE: 75 BPM | BODY MASS INDEX: 31.97 KG/M2 | SYSTOLIC BLOOD PRESSURE: 110 MMHG | DIASTOLIC BLOOD PRESSURE: 80 MMHG | WEIGHT: 236 LBS | RESPIRATION RATE: 20 BRPM

## 2022-04-12 DIAGNOSIS — R10.13 EPIGASTRIC ABDOMINAL PAIN: Primary | ICD-10-CM

## 2022-04-12 DIAGNOSIS — E78.2 MIXED HYPERLIPIDEMIA: ICD-10-CM

## 2022-04-12 DIAGNOSIS — R53.83 OTHER FATIGUE: ICD-10-CM

## 2022-04-12 DIAGNOSIS — Z11.4 ENCOUNTER FOR SCREENING FOR HIV: ICD-10-CM

## 2022-04-12 DIAGNOSIS — R19.4 ENCOUNTER FOR DIAGNOSTIC COLONOSCOPY DUE TO CHANGE IN BOWEL HABITS: ICD-10-CM

## 2022-04-12 DIAGNOSIS — K92.1 BLOOD IN STOOL: ICD-10-CM

## 2022-04-12 DIAGNOSIS — R73.9 HYPERGLYCEMIA: ICD-10-CM

## 2022-04-12 DIAGNOSIS — Z11.59 ENCOUNTER FOR HEPATITIS C SCREENING TEST FOR LOW RISK PATIENT: ICD-10-CM

## 2022-04-12 PROCEDURE — 99214 OFFICE O/P EST MOD 30 MIN: CPT | Performed by: NURSE PRACTITIONER

## 2022-04-12 PROCEDURE — 3725F SCREEN DEPRESSION PERFORMED: CPT | Performed by: NURSE PRACTITIONER

## 2022-04-12 NOTE — ASSESSMENT & PLAN NOTE
-epigastric pain on palpation  I am ordering an US to rule out cause  Also ordering h pylori testing on patient and labs  Will f/u with patient once resulted

## 2022-04-12 NOTE — ASSESSMENT & PLAN NOTE
-pt reports having an episode where he saw blood in toilet after BM  He states he does not have hemorrhoids or suffer from hemorrhoids  On exam noted to be normal without hemorrhoids felt however very difficult to say he may have internal hemorrhoid  I am recommending high fiber diet and increased fluids  Hemoccult done in office positive for blood  Ordering Colonoscopy to be done

## 2022-04-12 NOTE — PROGRESS NOTES
BMI Counseling: Body mass index is 32 01 kg/m²  The BMI is above normal  Nutrition recommendations include encouraging healthy choices of fruits and vegetables, decreasing fast food intake, consuming healthier snacks, limiting drinks that contain sugar, increasing intake of lean protein, reducing intake of saturated and trans fat and reducing intake of cholesterol  Exercise recommendations include exercising 3-5 times per week  Rationale for BMI follow-up plan is due to patient being overweight or obese  Depression Screening and Follow-up Plan: Patient was screened for depression during today's encounter  They screened negative with a PHQ-2 score of 0  Assessment/Plan:    Epigastric abdominal pain  -epigastric pain on palpation  I am ordering an US to rule out cause  Also ordering h pylori testing on patient and labs  Will f/u with patient once resulted  Blood in stool  -pt reports having an episode where he saw blood in toilet after BM  He states he does not have hemorrhoids or suffer from hemorrhoids  On exam noted to be normal without hemorrhoids felt however very difficult to say he may have internal hemorrhoid  I am recommending high fiber diet and increased fluids  Hemoccult done in office positive for blood  Ordering Colonoscopy to be done  Diagnoses and all orders for this visit:    Epigastric abdominal pain  -     H  pylori antigen, stool; Future  -     US abdomen complete; Future    Blood in stool  -     Ambulatory Referral to General Surgery; Future    Mixed hyperlipidemia  -     Lipid panel; Future    Hyperglycemia  -     Comprehensive metabolic panel; Future    Other fatigue  -     CBC and differential; Future    Encounter for screening for HIV  -     HIV 1/2 Antigen/Antibody (4th Generation) w Reflex [facility]; Future    Encounter for hepatitis C screening test for low risk patient  -     Hepatitis C antibody;  Future    Encounter for diagnostic colonoscopy due to change in bowel habits  - Ambulatory Referral to General Surgery; Future          Subjective:      Patient ID: Jim De La Torre is a 23 y o  male  23year old male patient here for follow up on epigastric pain and 1 episode of blood in stool  States this happened last week  States he does not have any hemorrhoids  Unsure what he ate that  Denies any pain with stooling  After stooling he noted blood in the toilet  Abdominal Pain  This is a new problem  The current episode started 1 to 4 weeks ago (2 weeks ago)  The onset quality is gradual  The problem occurs constantly  The problem has been waxing and waning  The pain is located in the epigastric region  The pain is at a severity of 5/10  The pain is moderate  The quality of the pain is aching  The abdominal pain does not radiate  Associated symptoms include belching, flatus and melena (1 episode)  Pertinent negatives include no arthralgias, constipation, diarrhea, fever, headaches, nausea or vomiting  The pain is aggravated by palpation and deep breathing  Relieved by: pressing down  The treatment provided moderate relief  Prior diagnostic workup includes ultrasound  His past medical history is significant for GERD  There is no history of abdominal surgery, gallstones, irritable bowel syndrome, pancreatitis or ulcerative colitis  The following portions of the patient's history were reviewed and updated as appropriate: allergies, current medications, past family history, past medical history, past social history, past surgical history and problem list     Review of Systems   Constitutional: Negative  Negative for appetite change, fatigue and fever  HENT: Negative  Negative for congestion, ear pain, postnasal drip, rhinorrhea, sore throat and voice change  Eyes: Negative  Respiratory: Negative  Negative for cough, chest tightness, shortness of breath and wheezing  Cardiovascular: Negative  Negative for chest pain and palpitations     Gastrointestinal: Positive for abdominal pain (epigastric), blood in stool, flatus and melena (1 episode)  Negative for abdominal distention, constipation, diarrhea, nausea and vomiting  Endocrine: Negative  Genitourinary: Negative  Negative for difficulty urinating  Musculoskeletal: Negative  Negative for arthralgias  Skin: Negative  Negative for color change and rash  Allergic/Immunologic: Negative  Neurological: Negative  Negative for dizziness and headaches  Hematological: Negative  Does not bruise/bleed easily  Psychiatric/Behavioral: Negative  Objective:      /80 (BP Location: Left arm, Patient Position: Sitting, Cuff Size: Standard)   Pulse 75   Temp 97 8 °F (36 6 °C) (Tympanic)   Resp 20   Ht 6' (1 829 m)   Wt 107 kg (236 lb)   SpO2 99%   BMI 32 01 kg/m²          Physical Exam  Vitals and nursing note reviewed  Exam conducted with a chaperone present  Constitutional:       General: He is not in acute distress  Appearance: Normal appearance  He is well-developed  He is obese  He is not ill-appearing  HENT:      Head: Normocephalic and atraumatic  Right Ear: External ear normal       Left Ear: External ear normal       Nose: Nose normal  No congestion or rhinorrhea  Mouth/Throat:      Pharynx: Oropharynx is clear  No oropharyngeal exudate  Eyes:      Conjunctiva/sclera: Conjunctivae normal    Cardiovascular:      Rate and Rhythm: Normal rate and regular rhythm  Pulses: Normal pulses  Heart sounds: Normal heart sounds  Pulmonary:      Effort: Pulmonary effort is normal  No respiratory distress  Breath sounds: Normal breath sounds  Abdominal:      General: Bowel sounds are normal       Palpations: Abdomen is soft  Tenderness: There is abdominal tenderness  There is no right CVA tenderness or left CVA tenderness  Genitourinary:     Rectum: Guaiac result positive  Internal hemorrhoid present  No tenderness or external hemorrhoid  Musculoskeletal:         General: Normal range of motion  Cervical back: Normal range of motion and neck supple  Skin:     General: Skin is warm and dry  Capillary Refill: Capillary refill takes less than 2 seconds  Neurological:      General: No focal deficit present  Mental Status: He is alert and oriented to person, place, and time     Psychiatric:         Mood and Affect: Mood normal          Behavior: Behavior normal              Chief Complaint   Patient presents with    Abdominal Pain

## 2022-04-15 ENCOUNTER — HOSPITAL ENCOUNTER (OUTPATIENT)
Dept: ULTRASOUND IMAGING | Facility: HOSPITAL | Age: 27
Discharge: HOME | End: 2022-04-15
Payer: COMMERCIAL

## 2022-04-15 ENCOUNTER — APPOINTMENT (OUTPATIENT)
Dept: LAB | Facility: HOSPITAL | Age: 27
End: 2022-04-15
Payer: COMMERCIAL

## 2022-04-15 DIAGNOSIS — E78.2 MIXED HYPERLIPIDEMIA: ICD-10-CM

## 2022-04-15 DIAGNOSIS — R53.83 OTHER FATIGUE: ICD-10-CM

## 2022-04-15 DIAGNOSIS — Z11.59 ENCOUNTER FOR HEPATITIS C SCREENING TEST FOR LOW RISK PATIENT: ICD-10-CM

## 2022-04-15 DIAGNOSIS — Z11.4 ENCOUNTER FOR SCREENING FOR HIV: ICD-10-CM

## 2022-04-15 DIAGNOSIS — R73.9 HYPERGLYCEMIA: ICD-10-CM

## 2022-04-15 DIAGNOSIS — R10.13 EPIGASTRIC ABDOMINAL PAIN: ICD-10-CM

## 2022-04-15 LAB
ALBUMIN SERPL BCP-MCNC: 3.7 G/DL (ref 3.5–5)
ALP SERPL-CCNC: 53 U/L (ref 46–116)
ALT SERPL W P-5'-P-CCNC: 42 U/L (ref 12–78)
ANION GAP SERPL CALCULATED.3IONS-SCNC: 5 MMOL/L (ref 4–13)
AST SERPL W P-5'-P-CCNC: 22 U/L (ref 5–45)
BASOPHILS # BLD AUTO: 0.03 THOUSANDS/ΜL (ref 0–0.1)
BASOPHILS NFR BLD AUTO: 1 % (ref 0–1)
BILIRUB SERPL-MCNC: 0.68 MG/DL (ref 0.2–1)
BUN SERPL-MCNC: 11 MG/DL (ref 5–25)
CALCIUM SERPL-MCNC: 8.9 MG/DL (ref 8.3–10.1)
CHLORIDE SERPL-SCNC: 103 MMOL/L (ref 100–108)
CHOLEST SERPL-MCNC: 231 MG/DL
CO2 SERPL-SCNC: 31 MMOL/L (ref 21–32)
CREAT SERPL-MCNC: 1.05 MG/DL (ref 0.6–1.3)
EOSINOPHIL # BLD AUTO: 0.38 THOUSAND/ΜL (ref 0–0.61)
EOSINOPHIL NFR BLD AUTO: 6 % (ref 0–6)
ERYTHROCYTE [DISTWIDTH] IN BLOOD BY AUTOMATED COUNT: 12.7 % (ref 11.6–15.1)
GFR SERPL CREATININE-BSD FRML MDRD: 98 ML/MIN/1.73SQ M
GLUCOSE SERPL-MCNC: 92 MG/DL (ref 65–140)
HCT VFR BLD AUTO: 47.9 % (ref 36.5–49.3)
HCV AB SER QL: NORMAL
HDLC SERPL-MCNC: 66 MG/DL
HGB BLD-MCNC: 16.7 G/DL (ref 12–17)
IMM GRANULOCYTES # BLD AUTO: 0.02 THOUSAND/UL (ref 0–0.2)
IMM GRANULOCYTES NFR BLD AUTO: 0 % (ref 0–2)
LDLC SERPL CALC-MCNC: 146 MG/DL (ref 0–100)
LYMPHOCYTES # BLD AUTO: 2.64 THOUSANDS/ΜL (ref 0.6–4.47)
LYMPHOCYTES NFR BLD AUTO: 45 % (ref 14–44)
MCH RBC QN AUTO: 31.2 PG (ref 26.8–34.3)
MCHC RBC AUTO-ENTMCNC: 34.9 G/DL (ref 31.4–37.4)
MCV RBC AUTO: 89 FL (ref 82–98)
MONOCYTES # BLD AUTO: 0.4 THOUSAND/ΜL (ref 0.17–1.22)
MONOCYTES NFR BLD AUTO: 7 % (ref 4–12)
NEUTROPHILS # BLD AUTO: 2.44 THOUSANDS/ΜL (ref 1.85–7.62)
NEUTS SEG NFR BLD AUTO: 41 % (ref 43–75)
NONHDLC SERPL-MCNC: 165 MG/DL
NRBC BLD AUTO-RTO: 0 /100 WBCS
PLATELET # BLD AUTO: 204 THOUSANDS/UL (ref 149–390)
PMV BLD AUTO: 11.3 FL (ref 8.9–12.7)
POTASSIUM SERPL-SCNC: 3.8 MMOL/L (ref 3.5–5.3)
PROT SERPL-MCNC: 7.6 G/DL (ref 6.4–8.2)
RBC # BLD AUTO: 5.36 MILLION/UL (ref 3.88–5.62)
SODIUM SERPL-SCNC: 139 MMOL/L (ref 136–145)
TRIGL SERPL-MCNC: 96 MG/DL
WBC # BLD AUTO: 5.91 THOUSAND/UL (ref 4.31–10.16)

## 2022-04-15 PROCEDURE — 80053 COMPREHEN METABOLIC PANEL: CPT

## 2022-04-15 PROCEDURE — 80061 LIPID PANEL: CPT

## 2022-04-15 PROCEDURE — 86803 HEPATITIS C AB TEST: CPT

## 2022-04-15 PROCEDURE — 85025 COMPLETE CBC W/AUTO DIFF WBC: CPT

## 2022-04-15 PROCEDURE — 76700 US EXAM ABDOM COMPLETE: CPT

## 2022-04-15 PROCEDURE — 87389 HIV-1 AG W/HIV-1&-2 AB AG IA: CPT

## 2022-04-15 PROCEDURE — 36415 COLL VENOUS BLD VENIPUNCTURE: CPT

## 2022-04-16 LAB — HIV 1+2 AB+HIV1 P24 AG SERPL QL IA: NORMAL

## 2022-04-26 ENCOUNTER — CONSULT (OUTPATIENT)
Dept: SURGERY | Facility: CLINIC | Age: 27
End: 2022-04-26
Payer: COMMERCIAL

## 2022-04-26 VITALS
HEART RATE: 92 BPM | OXYGEN SATURATION: 97 % | SYSTOLIC BLOOD PRESSURE: 112 MMHG | BODY MASS INDEX: 31.97 KG/M2 | HEIGHT: 72 IN | WEIGHT: 236 LBS | DIASTOLIC BLOOD PRESSURE: 82 MMHG | TEMPERATURE: 97.1 F

## 2022-04-26 DIAGNOSIS — K21.9 GASTROESOPHAGEAL REFLUX DISEASE, UNSPECIFIED WHETHER ESOPHAGITIS PRESENT: ICD-10-CM

## 2022-04-26 DIAGNOSIS — K92.1 BLOOD IN STOOL: ICD-10-CM

## 2022-04-26 DIAGNOSIS — R19.4 ENCOUNTER FOR DIAGNOSTIC COLONOSCOPY DUE TO CHANGE IN BOWEL HABITS: Primary | ICD-10-CM

## 2022-04-26 PROCEDURE — 3008F BODY MASS INDEX DOCD: CPT | Performed by: SURGERY

## 2022-04-26 PROCEDURE — 99204 OFFICE O/P NEW MOD 45 MIN: CPT | Performed by: SURGERY

## 2022-04-26 RX ORDER — OMEPRAZOLE 20 MG/1
20 CAPSULE, DELAYED RELEASE ORAL DAILY
Qty: 30 CAPSULE | Refills: 3 | Status: SHIPPED | OUTPATIENT
Start: 2022-04-26 | End: 2022-05-25 | Stop reason: SDUPTHER

## 2022-04-26 RX ORDER — POLYETHYLENE GLYCOL 3350 17 G/17G
238 POWDER, FOR SOLUTION ORAL SEE ADMIN INSTRUCTIONS
Qty: 238 G | Refills: 0 | OUTPATIENT
Start: 2022-04-26 | End: 2022-05-11

## 2022-04-26 NOTE — H&P (VIEW-ONLY)
Assessment/Plan:  Reflux and pain with eating certain foods that is improved by omeprazole indicative of a gastritis or GERD etiology  Abdominal ultrasound revealed normal appearing gallbladder and he has no right upper quadrant pain or nausea/vomiting with eating  Previous EGD revealed several nonspecific findings  History of blood in his stool without previous colonoscopy  Will perform colonoscopy and EGD to evaluate for source of bleed as well as follow-up on findings present for years ago on last EGD  Will also test for H pylori and perform biopsies  Discussed risks of the procedures with colonoscopy risks of abdominal pain/bloating, very low risk of bleeding and very low risk of perforation of the colon  Discussed risks of EGD including sore throat, bleeding, perforation  Informed consent was obtained bowel prep was explained a prescription will be sent to the pharmacy  Discussed referral to GI pending results of endoscopy  No problem-specific Assessment & Plan notes found for this encounter  Diagnoses and all orders for this visit:    Blood in stool  -     Ambulatory Referral to General Surgery    Encounter for diagnostic colonoscopy due to change in bowel habits  -     Ambulatory Referral to General Surgery          Subjective:      Patient ID: Nuzhat Gabriel is a 23 y o  male  He presents with a pain, mainly after eating  The pain will last several hours and then goes away  He says it is worse after eating spicy foods dairy and fried foods so he started avoiding those foods  He denies having pain when eating bland or healthy foods  He denies any cramping or lower abdominal pain  He also reports significant reflux  He was taking omeprazole which did help but was told to start cutting down on the omeprazole so he has not been taking it every day  He also reports blood mixed in with the stool that has happened several times over the last couple months    Last time it happened was about 4 weeks ago  He also reports some rectal pain with having a bowel movement but denies any constipation  He denies any family history of inflammatory bowel disease that he knows of  He denies significant diarrhea or loose watery stools  No constitutional symptoms  He did undergo an EGD 4 years ago and though the stomach and duodenum appeared normal 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  He has not followed up with a GI doctor since that time  A chart review was performed and previous primary care visit notes were reviewed  All applicable imaging studies were reviewed and images were reviewed personally  All applicable laboratory studies were reviewed personally  Care everywhere review was performed if  available and all pertinent notes were reviewed  The following portions of the patient's history were reviewed and updated as appropriate:   He  has a past medical history of COVID, Duodenitis, and Gastritis  He   Patient Active Problem List    Diagnosis Date Noted    Epigastric abdominal pain 04/12/2022    Blood in stool 04/12/2022    Mixed hyperlipidemia 04/12/2022    Hyperglycemia 04/12/2022    Other fatigue 04/12/2022    Encounter for screening for HIV 04/12/2022    Encounter for hepatitis C screening test for low risk patient 04/12/2022    Encounter for diagnostic colonoscopy due to change in bowel habits 04/12/2022    Back pain 01/09/2020    Leukocytosis 01/09/2020    Dysuria 10/14/2019    Diarrhea 07/09/2019    Abdominal cramping 07/09/2019    Encounter for general adult medical examination with abnormal findings 05/12/2019    Arthralgia of right temporomandibular joint 05/12/2019    Irritable bowel syndrome 03/12/2019     He  has a past surgical history that includes Esophagogastroduodenoscopy (08/2018)  His family history includes Hypertension in his mother  He  reports that he has been smoking pipe   He has never used smokeless tobacco  He reports current alcohol use  He reports that he does not use drugs  Current Outpatient Medications   Medication Sig Dispense Refill    omeprazole (PriLOSEC) 20 mg delayed release capsule Take 1 capsule (20 mg total) by mouth daily 30 capsule 0     No current facility-administered medications for this visit  He is allergic to aspirin and cyclobenzaprine       Review of Systems   Constitutional: Negative for appetite change and unexpected weight change  Gastrointestinal: Positive for abdominal pain, blood in stool and rectal pain  Negative for abdominal distention, anal bleeding, constipation, diarrhea and vomiting  All other systems reviewed and are negative  Objective:      /82 (BP Location: Left arm, Patient Position: Sitting, Cuff Size: Adult)   Pulse 92   Temp (!) 97 1 °F (36 2 °C) (Tympanic)   Ht 6' (1 829 m)   Wt 107 kg (236 lb)   SpO2 97%   BMI 32 01 kg/m²          Physical Exam  Vitals reviewed  Constitutional:       General: He is not in acute distress  Appearance: Normal appearance  He is normal weight  HENT:      Head: Normocephalic and atraumatic  Nose: Nose normal       Mouth/Throat:      Mouth: Mucous membranes are moist       Pharynx: Oropharynx is clear  Eyes:      Extraocular Movements: Extraocular movements intact  Conjunctiva/sclera: Conjunctivae normal       Pupils: Pupils are equal, round, and reactive to light  Cardiovascular:      Rate and Rhythm: Normal rate and regular rhythm  Heart sounds: Normal heart sounds  Pulmonary:      Effort: Pulmonary effort is normal       Breath sounds: Normal breath sounds  Abdominal:      General: Abdomen is flat  There is no distension  Palpations: Abdomen is soft  Tenderness: There is no abdominal tenderness  There is no guarding or rebound  Hernia: No hernia is present  Musculoskeletal:         General: No swelling or tenderness  Normal range of motion        Cervical back: Normal range of motion and neck supple  Skin:     General: Skin is warm and dry  Neurological:      General: No focal deficit present  Mental Status: He is alert and oriented to person, place, and time

## 2022-04-26 NOTE — PROGRESS NOTES
Assessment/Plan:  Reflux and pain with eating certain foods that is improved by omeprazole indicative of a gastritis or GERD etiology  Abdominal ultrasound revealed normal appearing gallbladder and he has no right upper quadrant pain or nausea/vomiting with eating  Previous EGD revealed several nonspecific findings  History of blood in his stool without previous colonoscopy  Will perform colonoscopy and EGD to evaluate for source of bleed as well as follow-up on findings present for years ago on last EGD  Will also test for H pylori and perform biopsies  Discussed risks of the procedures with colonoscopy risks of abdominal pain/bloating, very low risk of bleeding and very low risk of perforation of the colon  Discussed risks of EGD including sore throat, bleeding, perforation  Informed consent was obtained bowel prep was explained a prescription will be sent to the pharmacy  Discussed referral to GI pending results of endoscopy  No problem-specific Assessment & Plan notes found for this encounter  Diagnoses and all orders for this visit:    Blood in stool  -     Ambulatory Referral to General Surgery    Encounter for diagnostic colonoscopy due to change in bowel habits  -     Ambulatory Referral to General Surgery          Subjective:      Patient ID: Kiran Colon is a 23 y o  male  He presents with a pain, mainly after eating  The pain will last several hours and then goes away  He says it is worse after eating spicy foods dairy and fried foods so he started avoiding those foods  He denies having pain when eating bland or healthy foods  He denies any cramping or lower abdominal pain  He also reports significant reflux  He was taking omeprazole which did help but was told to start cutting down on the omeprazole so he has not been taking it every day  He also reports blood mixed in with the stool that has happened several times over the last couple months    Last time it happened was about 4 weeks ago  He also reports some rectal pain with having a bowel movement but denies any constipation  He denies any family history of inflammatory bowel disease that he knows of  He denies significant diarrhea or loose watery stools  No constitutional symptoms  He did undergo an EGD 4 years ago and though the stomach and duodenum appeared normal 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  He has not followed up with a GI doctor since that time  A chart review was performed and previous primary care visit notes were reviewed  All applicable imaging studies were reviewed and images were reviewed personally  All applicable laboratory studies were reviewed personally  Care everywhere review was performed if  available and all pertinent notes were reviewed  The following portions of the patient's history were reviewed and updated as appropriate:   He  has a past medical history of COVID, Duodenitis, and Gastritis  He   Patient Active Problem List    Diagnosis Date Noted    Epigastric abdominal pain 04/12/2022    Blood in stool 04/12/2022    Mixed hyperlipidemia 04/12/2022    Hyperglycemia 04/12/2022    Other fatigue 04/12/2022    Encounter for screening for HIV 04/12/2022    Encounter for hepatitis C screening test for low risk patient 04/12/2022    Encounter for diagnostic colonoscopy due to change in bowel habits 04/12/2022    Back pain 01/09/2020    Leukocytosis 01/09/2020    Dysuria 10/14/2019    Diarrhea 07/09/2019    Abdominal cramping 07/09/2019    Encounter for general adult medical examination with abnormal findings 05/12/2019    Arthralgia of right temporomandibular joint 05/12/2019    Irritable bowel syndrome 03/12/2019     He  has a past surgical history that includes Esophagogastroduodenoscopy (08/2018)  His family history includes Hypertension in his mother  He  reports that he has been smoking pipe   He has never used smokeless tobacco  He reports current alcohol use  He reports that he does not use drugs  Current Outpatient Medications   Medication Sig Dispense Refill    omeprazole (PriLOSEC) 20 mg delayed release capsule Take 1 capsule (20 mg total) by mouth daily 30 capsule 0     No current facility-administered medications for this visit  He is allergic to aspirin and cyclobenzaprine       Review of Systems   Constitutional: Negative for appetite change and unexpected weight change  Gastrointestinal: Positive for abdominal pain, blood in stool and rectal pain  Negative for abdominal distention, anal bleeding, constipation, diarrhea and vomiting  All other systems reviewed and are negative  Objective:      /82 (BP Location: Left arm, Patient Position: Sitting, Cuff Size: Adult)   Pulse 92   Temp (!) 97 1 °F (36 2 °C) (Tympanic)   Ht 6' (1 829 m)   Wt 107 kg (236 lb)   SpO2 97%   BMI 32 01 kg/m²          Physical Exam  Vitals reviewed  Constitutional:       General: He is not in acute distress  Appearance: Normal appearance  He is normal weight  HENT:      Head: Normocephalic and atraumatic  Nose: Nose normal       Mouth/Throat:      Mouth: Mucous membranes are moist       Pharynx: Oropharynx is clear  Eyes:      Extraocular Movements: Extraocular movements intact  Conjunctiva/sclera: Conjunctivae normal       Pupils: Pupils are equal, round, and reactive to light  Cardiovascular:      Rate and Rhythm: Normal rate and regular rhythm  Heart sounds: Normal heart sounds  Pulmonary:      Effort: Pulmonary effort is normal       Breath sounds: Normal breath sounds  Abdominal:      General: Abdomen is flat  There is no distension  Palpations: Abdomen is soft  Tenderness: There is no abdominal tenderness  There is no guarding or rebound  Hernia: No hernia is present  Musculoskeletal:         General: No swelling or tenderness  Normal range of motion        Cervical back: Normal range of motion and neck supple  Skin:     General: Skin is warm and dry  Neurological:      General: No focal deficit present  Mental Status: He is alert and oriented to person, place, and time

## 2022-05-03 ENCOUNTER — HOSPITAL ENCOUNTER (EMERGENCY)
Facility: HOSPITAL | Age: 27
Discharge: HOME | End: 2022-05-03
Attending: EMERGENCY MEDICINE | Admitting: EMERGENCY MEDICINE
Payer: COMMERCIAL

## 2022-05-03 VITALS
DIASTOLIC BLOOD PRESSURE: 96 MMHG | SYSTOLIC BLOOD PRESSURE: 137 MMHG | OXYGEN SATURATION: 97 % | BODY MASS INDEX: 32.35 KG/M2 | WEIGHT: 238.54 LBS | TEMPERATURE: 98.4 F | HEART RATE: 83 BPM | RESPIRATION RATE: 18 BRPM

## 2022-05-03 DIAGNOSIS — J30.2 SEASONAL ALLERGIES: Primary | ICD-10-CM

## 2022-05-03 PROCEDURE — 99283 EMERGENCY DEPT VISIT LOW MDM: CPT

## 2022-05-03 PROCEDURE — 99284 EMERGENCY DEPT VISIT MOD MDM: CPT | Performed by: PHYSICIAN ASSISTANT

## 2022-05-03 RX ORDER — METHYLPREDNISOLONE 4 MG/1
TABLET ORAL
Qty: 21 TABLET | Refills: 0 | Status: SHIPPED | OUTPATIENT
Start: 2022-05-03

## 2022-05-03 RX ORDER — FLUTICASONE PROPIONATE 50 MCG
1 SPRAY, SUSPENSION (ML) NASAL DAILY
Qty: 16 G | Refills: 0 | Status: SHIPPED | OUTPATIENT
Start: 2022-05-03

## 2022-05-03 RX ORDER — ALBUTEROL SULFATE 90 UG/1
1-2 AEROSOL, METERED RESPIRATORY (INHALATION) EVERY 6 HOURS PRN
Qty: 18 G | Refills: 0 | Status: SHIPPED | OUTPATIENT
Start: 2022-05-03

## 2022-05-04 NOTE — ED PROVIDER NOTES
History  Chief Complaint   Patient presents with    Allergic Reaction     Pt states "I think i ate something " Pt reports itchy rash on face, eye redness since this morning  No angioedema noted  Patient is a 50-year-old male with history of seasonal allergies, presents emergency department for evaluation congestion, cough, eye itching, postnasal drip and chest tightness  Patient states he has had symptoms for 1 month  Patient has been taking Claritin daily relief symptoms  Patient states Benadryl work for him  Patient states he gets allergies like this every year as when the weather changes  Patient denies any sick contacts or recent travel  Patient denies any difficulty swallowing or difficulty breathing  Patient states he did see clear ophthalmic drops which did relieve his symptoms eye itching  Patient without fever, chills chest pain shortness a headache vision changes, pain with extraocular motion lip/tongue/throat swelling  Prior to Admission Medications   Prescriptions Last Dose Informant Patient Reported? Taking?   bisacodyl (DULCOLAX) 5 mg EC tablet   No No   Sig: Take 1 tablet (5 mg total) by mouth see administration instructions Take 2 tablets at 12:00 p m  on day prior to colonoscopy  Take 2 tablets at 4:00 p m  on day prior to colonoscopy   omeprazole (PriLOSEC) 20 mg delayed release capsule   No No   Sig: Take 1 capsule (20 mg total) by mouth daily   polyethylene glycol (GLYCOLAX) 17 GM/SCOOP powder   No No   Sig: Take 238 g by mouth see administration instructions Mix 238 g with 64 oz of clear liquid  Drink half starting at noon on the day prior to colonoscopy  Drink remaining half 6 hours prior to procedure on day of colonoscopy        Facility-Administered Medications: None       Past Medical History:   Diagnosis Date    COVID     Duodenitis     Gastritis        Past Surgical History:   Procedure Laterality Date    ESOPHAGOGASTRODUODENOSCOPY  08/2018    duodenitis Family History   Problem Relation Age of Onset    Hypertension Mother      I have reviewed and agree with the history as documented  E-Cigarette/Vaping    E-Cigarette Use Never User      E-Cigarette/Vaping Substances    Nicotine No     THC No     CBD No     Flavoring No     Other No     Unknown No      Social History     Tobacco Use    Smoking status: Current Some Day Smoker     Types: Pipe    Smokeless tobacco: Never Used    Tobacco comment: Hookah   Vaping Use    Vaping Use: Never used   Substance Use Topics    Alcohol use: Yes     Comment: occasionally    Drug use: No       Review of Systems   Constitutional: Negative for chills and fever  HENT: Positive for congestion and postnasal drip  Negative for ear discharge, ear pain, sore throat and trouble swallowing  Eyes: Positive for itching  Negative for visual disturbance  Respiratory: Positive for cough, chest tightness and wheezing  Negative for shortness of breath  Cardiovascular: Negative for chest pain  Gastrointestinal: Negative for abdominal pain, diarrhea, nausea and vomiting  Genitourinary: Negative for dysuria and hematuria  Musculoskeletal: Negative for back pain and neck pain  Skin: Positive for rash  Neurological: Negative for dizziness, speech difficulty, weakness and headaches  Psychiatric/Behavioral: Negative for confusion  Physical Exam  Physical Exam  Constitutional:       General: He is not in acute distress  Appearance: He is well-developed  He is not ill-appearing, toxic-appearing or diaphoretic  HENT:      Head: Normocephalic and atraumatic  Right Ear: External ear normal       Left Ear: External ear normal       Nose: Nose normal       Mouth/Throat:      Lips: Pink  Mouth: Mucous membranes are moist    Eyes:      General: Allergic shiner present  Extraocular Movements: Extraocular movements intact        Conjunctiva/sclera: Conjunctivae normal       Comments: Tearing bilaterally  No pain with EOM   Cardiovascular:      Rate and Rhythm: Normal rate and regular rhythm  Pulmonary:      Effort: Pulmonary effort is normal  No tachypnea or respiratory distress  Breath sounds: Normal breath sounds  No decreased breath sounds, wheezing, rhonchi or rales  Musculoskeletal:      Cervical back: Normal range of motion and neck supple  Right lower leg: Normal  No swelling, tenderness or bony tenderness  No edema  Left lower leg: Normal  No swelling, tenderness or bony tenderness  No edema  Skin:     General: Skin is warm and dry  Capillary Refill: Capillary refill takes less than 2 seconds  Comments: Red blotches to face  No skin sloughing  No rashes in the mouth  No redness in the eyes  No bullae  No petechiae  No central clearing/ targetoid lesions to suggest erythema multiforme  No mucosal involvement  No neck stiffness  No hemorrhagic lesions  No lesions with central necrosis  No current fevers  No desquamation of the skin to suggest staph scalded skin syndrome  No blistering  No strawberry tongue  No recent tick bites to suggest [facility] spotted fever  No crepitus  Neurological:      Mental Status: He is alert and oriented to person, place, and time  GCS: GCS eye subscore is 4  GCS verbal subscore is 5  GCS motor subscore is 6     Psychiatric:         Mood and Affect: Mood and affect normal          Speech: Speech normal          Vital Signs  ED Triage Vitals [05/03/22 2136]   Temperature Pulse Respirations Blood Pressure SpO2   98 4 °F (36 9 °C) 83 18 137/96 97 %      Temp src Heart Rate Source Patient Position - Orthostatic VS BP Location FiO2 (%)   -- Monitor -- -- --      Pain Score       --           Vitals:    05/03/22 2136   BP: 137/96   Pulse: 83         Visual Acuity      ED Medications  Medications - No data to display    Diagnostic Studies  Results Reviewed     None                 No orders to display Procedures  Procedures         ED Course                                             MDM  Number of Diagnoses or Management Options  Seasonal allergies  Diagnosis management comments: Patient is a 71-year-old male with history of seasonal allergies, presents emergency department for evaluation congestion, cough, eye itching, postnasal drip and chest tightness  Symptoms likely secondary to she is allergies provided patient with Flonase Medrol Dosepak albuterol inhaler and antihistamine eye drops  Follow-up asthma/allergy specialist    Patient verbalizes understanding and agrees with plan  The management plan was discussed in detail with the patient at bedside and all questions were answered  Prior to discharge, I provided both verbal and written instructions  I discussed with the patient the signs and symptoms for which to return to the emergency department  All questions were answered and patient was comfortable with the plan of care and discharged to home  The patient agrees to return to the Emergency Department for concerns and/or progression of illness  Disposition  Final diagnoses:   Seasonal allergies     Time reflects when diagnosis was documented in both MDM as applicable and the Disposition within this note     Time User Action Codes Description Comment    5/3/2022 10:51 PM Yocasta Salter [J30 2] Seasonal allergies       ED Disposition     ED Disposition Condition Date/Time Comment    Discharge Stable Tue May 3, 2022 10:51 PM Fernanda Omalley discharge to home/self care              Follow-up Information     Follow up With Specialties Details Why Contact Info Additional Information    [facility] Schedule an appointment as soon as possible for a visit   [address] [place] [id number]  [phone] [facility], [address] [place] [address] 100, [facility], [address]          Patient's Medications   Discharge Prescriptions    ALBUTEROL (PROVENTIL HFA) 90 MCG/ACT INHALER    Inhale 1-2 puffs every 6 (six) hours as needed for wheezing or shortness of breath       Start Date: 5/3/2022  End Date: --       Order Dose: 1-2 puffs       Quantity: 18 g    Refills: 0    FLUTICASONE (FLONASE) 50 MCG/ACT NASAL SPRAY    1 spray into each nostril daily       Start Date: 5/3/2022  End Date: --       Order Dose: 1 spray       Quantity: 16 g    Refills: 0    METHYLPREDNISOLONE 4 MG TABLET THERAPY PACK    Use as directed on package       Start Date: 5/3/2022  End Date: --       Order Dose: --       Quantity: 21 tablet    Refills: 0    NAPHAZOLINE-PHENIRAMINE (NAPHCON-A) 0 025-0 3 % OPHTHALMIC SOLUTION    Administer 2 drops to both eyes every 6 (six) hours as needed for allergies or irritation       Start Date: 5/3/2022  End Date: --       Order Dose: 2 drops       Quantity: 15 mL    Refills: 0       No discharge procedures on file      PDMP Review     None          ED Provider  Electronically Signed by           Sada Solis PA-C  05/03/22 3063

## 2022-05-11 ENCOUNTER — ANESTHESIA EVENT (OUTPATIENT)
Dept: GASTROENTEROLOGY | Facility: HOSPITAL | Age: 27
End: 2022-05-11

## 2022-05-11 ENCOUNTER — HOSPITAL ENCOUNTER (OUTPATIENT)
Dept: GASTROENTEROLOGY | Facility: HOSPITAL | Age: 27
Setting detail: OUTPATIENT SURGERY
Discharge: HOME | End: 2022-05-11
Attending: SURGERY | Admitting: SURGERY
Payer: COMMERCIAL

## 2022-05-11 ENCOUNTER — ANESTHESIA (OUTPATIENT)
Dept: GASTROENTEROLOGY | Facility: HOSPITAL | Age: 27
End: 2022-05-11

## 2022-05-11 VITALS
HEART RATE: 73 BPM | RESPIRATION RATE: 20 BRPM | BODY MASS INDEX: 30.75 KG/M2 | TEMPERATURE: 97.4 F | DIASTOLIC BLOOD PRESSURE: 69 MMHG | SYSTOLIC BLOOD PRESSURE: 116 MMHG | WEIGHT: 232 LBS | HEIGHT: 73 IN | OXYGEN SATURATION: 99 %

## 2022-05-11 DIAGNOSIS — K21.9 GASTROESOPHAGEAL REFLUX DISEASE, UNSPECIFIED WHETHER ESOPHAGITIS PRESENT: ICD-10-CM

## 2022-05-11 DIAGNOSIS — K92.1 BLOOD IN STOOL: ICD-10-CM

## 2022-05-11 PROCEDURE — 88341 IMHCHEM/IMCYTCHM EA ADD ANTB: CPT | Performed by: PATHOLOGY

## 2022-05-11 PROCEDURE — 88342 IMHCHEM/IMCYTCHM 1ST ANTB: CPT | Performed by: PATHOLOGY

## 2022-05-11 PROCEDURE — 45378 DIAGNOSTIC COLONOSCOPY: CPT | Performed by: SURGERY

## 2022-05-11 PROCEDURE — 43239 EGD BIOPSY SINGLE/MULTIPLE: CPT | Performed by: SURGERY

## 2022-05-11 PROCEDURE — 88305 TISSUE EXAM BY PATHOLOGIST: CPT | Performed by: PATHOLOGY

## 2022-05-11 PROCEDURE — 88313 SPECIAL STAINS GROUP 2: CPT | Performed by: PATHOLOGY

## 2022-05-11 RX ORDER — SODIUM CHLORIDE 9 MG/ML
125 INJECTION, SOLUTION INTRAVENOUS CONTINUOUS
Status: DISCONTINUED | OUTPATIENT
Start: 2022-05-11 | End: 2022-05-15 | Stop reason: HOSPADM

## 2022-05-11 RX ORDER — PROPOFOL 10 MG/ML
INJECTION, EMULSION INTRAVENOUS AS NEEDED
Status: DISCONTINUED | OUTPATIENT
Start: 2022-05-11 | End: 2022-05-11

## 2022-05-11 RX ORDER — LIDOCAINE HYDROCHLORIDE 10 MG/ML
INJECTION, SOLUTION EPIDURAL; INFILTRATION; INTRACAUDAL; PERINEURAL AS NEEDED
Status: DISCONTINUED | OUTPATIENT
Start: 2022-05-11 | End: 2022-05-11

## 2022-05-11 RX ORDER — PROPOFOL 10 MG/ML
INJECTION, EMULSION INTRAVENOUS CONTINUOUS PRN
Status: DISCONTINUED | OUTPATIENT
Start: 2022-05-11 | End: 2022-05-11

## 2022-05-11 RX ADMIN — SODIUM CHLORIDE 125 ML/HR: 0.9 INJECTION, SOLUTION INTRAVENOUS at 10:15

## 2022-05-11 RX ADMIN — LIDOCAINE HYDROCHLORIDE 100 MG: 10 INJECTION, SOLUTION EPIDURAL; INFILTRATION; INTRACAUDAL; PERINEURAL at 11:03

## 2022-05-11 RX ADMIN — PROPOFOL 50 MG: 10 INJECTION, EMULSION INTRAVENOUS at 11:05

## 2022-05-11 RX ADMIN — PROPOFOL 50 MG: 10 INJECTION, EMULSION INTRAVENOUS at 11:18

## 2022-05-11 RX ADMIN — PROPOFOL 140 MCG/KG/MIN: 10 INJECTION, EMULSION INTRAVENOUS at 11:18

## 2022-05-11 RX ADMIN — PROPOFOL 50 MG: 10 INJECTION, EMULSION INTRAVENOUS at 11:07

## 2022-05-11 RX ADMIN — PROPOFOL 50 MG: 10 INJECTION, EMULSION INTRAVENOUS at 11:15

## 2022-05-11 RX ADMIN — PROPOFOL 20 MG: 10 INJECTION, EMULSION INTRAVENOUS at 11:04

## 2022-05-11 RX ADMIN — PROPOFOL 180 MG: 10 INJECTION, EMULSION INTRAVENOUS at 11:03

## 2022-05-11 RX ADMIN — PROPOFOL 50 MG: 10 INJECTION, EMULSION INTRAVENOUS at 11:11

## 2022-05-11 NOTE — INTERVAL H&P NOTE
H&P reviewed  After examining the patient I find no changes in the patients condition since the H&P had been written      Vitals:    05/11/22 1000   BP: 120/84   Pulse: 77   Resp: 20   Temp: 97 5 °F (36 4 °C)   SpO2: 100%

## 2022-05-11 NOTE — ANESTHESIA POSTPROCEDURE EVALUATION
Post-Op Assessment Note    CV Status:  Stable  Pain Score: 0    Pain management: adequate     Mental Status:  Alert and awake   Hydration Status:  Euvolemic   PONV Controlled:  Controlled   Airway Patency:  Patent      Post Op Vitals Reviewed: Yes      Staff: CRNA         No complications documented      /69 (05/11/22 1150)    Temp (!) 97 4 °F (36 3 °C) (05/11/22 1150)    Pulse 76 (05/11/22 1150)   Resp 20 (05/11/22 1150)    SpO2 99 % (05/11/22 1150)

## 2022-05-11 NOTE — ANESTHESIA PREPROCEDURE EVALUATION
Procedure:  COLONOSCOPY  EGD    Relevant Problems   CARDIO   (+) Mixed hyperlipidemia      MUSCULOSKELETAL   (+) Back pain        Physical Exam    Airway    Mallampati score: I  TM Distance: >3 FB  Neck ROM: full     Dental   No notable dental hx     Cardiovascular      Pulmonary      Other Findings        Anesthesia Plan  ASA Score- 2     Anesthesia Type- IV sedation with anesthesia with ASA Monitors  Additional Monitors:   Airway Plan:           Plan Factors-Exercise tolerance (METS): >4 METS  Chart reviewed  Patient summary reviewed  Patient is not a current smoker  Patient did not smoke on day of surgery  Induction- intravenous  Postoperative Plan-     Informed Consent- Anesthetic plan and risks discussed with patient  I personally reviewed this patient with the CRNA  Discussed and agreed on the Anesthesia Plan with the CRNA  Carlin Putnam

## 2022-05-11 NOTE — NURSING NOTE
Awake and tolerating po well   Discharge instructions reviewed and awaiting brother arrival for ride home

## 2022-05-25 DIAGNOSIS — K21.9 GASTROESOPHAGEAL REFLUX DISEASE, UNSPECIFIED WHETHER ESOPHAGITIS PRESENT: ICD-10-CM

## 2022-05-25 RX ORDER — OMEPRAZOLE 20 MG/1
20 CAPSULE, DELAYED RELEASE ORAL DAILY
Qty: 30 CAPSULE | Refills: 3 | Status: SHIPPED | OUTPATIENT
Start: 2022-05-25

## 2022-05-27 ENCOUNTER — TELEPHONE (OUTPATIENT)
Dept: SURGERY | Facility: CLINIC | Age: 27
End: 2022-05-27

## 2022-08-09 ENCOUNTER — TELEPHONE (OUTPATIENT)
Dept: DERMATOLOGY | Facility: CLINIC | Age: 27
End: 2022-08-09

## 2022-08-31 ENCOUNTER — NURSE TRIAGE (OUTPATIENT)
Dept: OTHER | Facility: OTHER | Age: 27
End: 2022-08-31

## 2022-08-31 NOTE — TELEPHONE ENCOUNTER
Regarding: Abdominal pain   ----- Message from Brian Wright sent at 8/31/2022  5:00 PM EDT -----  "I am having abdominal pain "

## 2022-08-31 NOTE — TELEPHONE ENCOUNTER
assisted in communicating with patient  Patient calling in complaining of upper abdominal pain that has been intermittent and ongoing for about a month now  The patient denies any nausea, vomiting or diarrhea  He states he has been taking his omeprazole  Care advice given to patient and advised him to proceed with a mild diet and clear liquids as well as OTC antacids  He would like to know if he needs any follow up from his EGD back in may  Please follow up with patient

## 2022-08-31 NOTE — TELEPHONE ENCOUNTER
Reason for Disposition   Abdominal pain is a chronic symptom (recurrent or ongoing AND present > 4 weeks)    Answer Assessment - Initial Assessment Questions  1  LOCATION: "Where does it hurt?"       Upper abdomen- tenderness     2  RADIATION: "Does the pain shoot anywhere else?" (e g , chest, back)     Denies     3  ONSET: "When did the pain begin?" (Minutes, hours or days ago)       About a month ago     4  SUDDEN: "Gradual or sudden onset?"      Gradual     5  PATTERN "Does the pain come and go, or is it constant?"     - If constant: "Is it getting better, staying the same, or worsening?"       (Note: Constant means the pain never goes away completely; most serious pain is constant and it progresses)      - If intermittent: "How long does it last?" "Do you have pain now?"      (Note: Intermittent means the pain goes away completely between bouts)     Intermittent     6  SEVERITY: "How bad is the pain?"  (e g , Scale 1-10; mild, moderate, or severe)     - MILD (1-3): doesn't interfere with normal activities, abdomen soft and not tender to touch      - MODERATE (4-7): interferes with normal activities or awakens from sleep, tender to touch      - SEVERE (8-10): excruciating pain, doubled over, unable to do any normal activities        8/10    7  RECURRENT SYMPTOM: "Have you ever had this type of stomach pain before?" If Yes, ask: "When was the last time?" and "What happened that time?"      No     8  CAUSE: "What do you think is causing the stomach pain?"      Unsure     9  RELIEVING/AGGRAVATING FACTORS: "What makes it better or worse?" (e g , movement, antacids, bowel movement)      Counter pressure seems to make it better     10   OTHER SYMPTOMS: "Has there been any vomiting, diarrhea, constipation, or urine problems?"        Denies    Protocols used: [facility]

## 2022-09-14 ENCOUNTER — OFFICE VISIT (OUTPATIENT)
Dept: FAMILY MEDICINE CLINIC | Facility: CLINIC | Age: 27
End: 2022-09-14
Payer: COMMERCIAL

## 2022-09-14 VITALS
BODY MASS INDEX: 32.71 KG/M2 | TEMPERATURE: 97.6 F | DIASTOLIC BLOOD PRESSURE: 80 MMHG | SYSTOLIC BLOOD PRESSURE: 122 MMHG | WEIGHT: 246.8 LBS | HEIGHT: 73 IN | OXYGEN SATURATION: 98 % | RESPIRATION RATE: 20 BRPM | HEART RATE: 80 BPM

## 2022-09-14 DIAGNOSIS — F41.9 ANXIETY: ICD-10-CM

## 2022-09-14 DIAGNOSIS — Z00.01 ENCOUNTER FOR GENERAL ADULT MEDICAL EXAMINATION WITH ABNORMAL FINDINGS: Primary | ICD-10-CM

## 2022-09-14 DIAGNOSIS — K29.30 CHRONIC SUPERFICIAL GASTRITIS WITHOUT BLEEDING: ICD-10-CM

## 2022-09-14 DIAGNOSIS — M94.0 COSTOCHONDRITIS: ICD-10-CM

## 2022-09-14 PROCEDURE — 99395 PREV VISIT EST AGE 18-39: CPT | Performed by: FAMILY MEDICINE

## 2022-09-14 RX ORDER — PREDNISONE 20 MG/1
20 TABLET ORAL 2 TIMES DAILY WITH MEALS
Qty: 10 TABLET | Refills: 0 | Status: SHIPPED | OUTPATIENT
Start: 2022-09-14 | End: 2022-09-14 | Stop reason: SDUPTHER

## 2022-09-14 RX ORDER — ESCITALOPRAM OXALATE 10 MG/1
10 TABLET ORAL DAILY
Qty: 30 TABLET | Refills: 5 | Status: SHIPPED | OUTPATIENT
Start: 2022-09-14

## 2022-09-14 RX ORDER — OMEPRAZOLE 20 MG/1
20 CAPSULE, DELAYED RELEASE ORAL
Qty: 30 CAPSULE | Refills: 5 | Status: SHIPPED | OUTPATIENT
Start: 2022-09-14 | End: 2022-09-14 | Stop reason: SDUPTHER

## 2022-09-14 RX ORDER — OMEPRAZOLE 20 MG/1
20 CAPSULE, DELAYED RELEASE ORAL
Qty: 30 CAPSULE | Refills: 5 | Status: SHIPPED | OUTPATIENT
Start: 2022-09-14

## 2022-09-14 RX ORDER — PREDNISONE 20 MG/1
20 TABLET ORAL 2 TIMES DAILY WITH MEALS
Qty: 10 TABLET | Refills: 0 | Status: SHIPPED | OUTPATIENT
Start: 2022-09-14 | End: 2022-09-19 | Stop reason: SDUPTHER

## 2022-09-14 RX ORDER — ESCITALOPRAM OXALATE 10 MG/1
10 TABLET ORAL DAILY
Qty: 30 TABLET | Refills: 5 | Status: SHIPPED | OUTPATIENT
Start: 2022-09-14 | End: 2022-09-14 | Stop reason: SDUPTHER

## 2022-09-15 DIAGNOSIS — M94.0 COSTOCHONDRITIS: ICD-10-CM

## 2022-09-18 NOTE — PROGRESS NOTES
Name: Ida Sommer      : 1995      MRN: [id number]  Encounter Provider: Sarah Hernandez MD  Encounter Date: 2022   [facility] [place] [address]     1  Encounter for general adult medical examination with abnormal findings    2  Costochondritis  -     predniSONE 20 mg tablet; Take 1 tablet (20 mg total) by mouth 2 (two) times a day with meals    3  Anxiety  -     escitalopram (Lexapro) 10 mg tablet; Take 1 tablet (10 mg total) by mouth daily    4  Chronic superficial gastritis without bleeding  -     omeprazole (PriLOSEC) 20 mg delayed release capsule; Take 1 capsule (20 mg total) by mouth daily before breakfast           Subjective      Patient is here for PE, not having any acute distress  This is a chronic problem  Pain is on area of Xyphoid process          Review of Systems   Constitutional: Negative for diaphoresis, fatigue, fever and unexpected weight change  Respiratory: Negative for apnea, cough, choking, chest tightness and shortness of breath  Cardiovascular: Negative for chest pain, palpitations and leg swelling  Gastrointestinal: Negative for abdominal distention, abdominal pain, anal bleeding, blood in stool and constipation  Musculoskeletal: Negative for arthralgias, back pain, gait problem and joint swelling  Neurological: Negative for dizziness, facial asymmetry, light-headedness and headaches  Psychiatric/Behavioral: Negative for behavioral problems, dysphoric mood and self-injury  The patient is not nervous/anxious  Current Outpatient Medications on File Prior to Visit   Medication Sig    [DISCONTINUED] bisacodyl (DULCOLAX) 5 mg EC tablet Take 1 tablet (5 mg total) by mouth see administration instructions Take 2 tablets at 12:00 p m  on day prior to colonoscopy    Take 2 tablets at 4:00 p m  on day prior to colonoscopy    [DISCONTINUED] polyethylene glycol (GLYCOLAX) 17 GM/SCOOP powder Take 238 g by mouth see administration instructions Mix 238 g with 64 oz of clear liquid  Drink half starting at noon on the day prior to colonoscopy  Drink remaining half 6 hours prior to procedure on day of colonoscopy  Objective     /80 (BP Location: Left arm, Patient Position: Sitting, Cuff Size: Standard)   Pulse 80   Temp 97 6 °F (36 4 °C) (Tympanic)   Resp 20   Ht 6' 1" (1 854 m)   Wt 112 kg (246 lb 12 8 oz)   SpO2 98%   BMI 32 56 kg/m²     Physical Exam  Vitals and nursing note reviewed  Neck:      Thyroid: No thyroid mass or thyromegaly  Vascular: No carotid bruit or JVD  Trachea: No tracheal tenderness  Cardiovascular:      Rate and Rhythm: Normal rate and regular rhythm  No extrasystoles are present  Pulses: Normal pulses  Heart sounds: Normal heart sounds  Heart sounds not distant  No friction rub  Pulmonary:      Effort: Pulmonary effort is normal  No tachypnea or bradypnea  Breath sounds: Normal breath sounds  No stridor  Abdominal:      General: Bowel sounds are normal  There is no abdominal bruit  Palpations: Abdomen is soft  There is no hepatomegaly or splenomegaly  Hernia: No hernia is present  Musculoskeletal:         General: Normal range of motion  Cervical back: No edema or rigidity  Skin:     General: Skin is warm and dry  Neurological:      Mental Status: He is alert and oriented to person, place, and time  Deep Tendon Reflexes: Reflexes are normal and symmetric  Psychiatric:         Behavior: Behavior normal          Thought Content:  Thought content normal          Judgment: Judgment normal        Javed Tovar MD

## 2022-09-19 RX ORDER — PREDNISONE 20 MG/1
20 TABLET ORAL 2 TIMES DAILY WITH MEALS
Qty: 10 TABLET | Refills: 0 | Status: SHIPPED | OUTPATIENT
Start: 2022-09-19

## 2022-09-26 ENCOUNTER — TELEPHONE (OUTPATIENT)
Dept: FAMILY MEDICINE CLINIC | Facility: CLINIC | Age: 27
End: 2022-09-26

## 2022-09-26 NOTE — TELEPHONE ENCOUNTER
PT called stated he wants a call back from provider regarding to a question provider asked him at last visit  Will not disclosed

## 2022-09-27 ENCOUNTER — TELEPHONE (OUTPATIENT)
Dept: OTHER | Facility: OTHER | Age: 27
End: 2022-09-27

## 2022-09-27 DIAGNOSIS — N52.9 IMPOTENCE: Primary | ICD-10-CM

## 2022-09-27 DIAGNOSIS — M94.0 COSTOCHONDRITIS: ICD-10-CM

## 2022-09-27 NOTE — TELEPHONE ENCOUNTER
Patient was supposed to be prescribed a medication for impotence, but there is nothing at the pharmacy  Please follow up with patient

## 2022-09-30 ENCOUNTER — APPOINTMENT (OUTPATIENT)
Dept: LAB | Facility: HOSPITAL | Age: 27
End: 2022-09-30
Payer: COMMERCIAL

## 2022-09-30 DIAGNOSIS — N52.9 IMPOTENCE: ICD-10-CM

## 2022-09-30 PROCEDURE — 84403 ASSAY OF TOTAL TESTOSTERONE: CPT

## 2022-09-30 PROCEDURE — 36415 COLL VENOUS BLD VENIPUNCTURE: CPT

## 2022-09-30 PROCEDURE — 84402 ASSAY OF FREE TESTOSTERONE: CPT

## 2022-10-01 LAB
TESTOST FREE SERPL-MCNC: 13.3 PG/ML (ref 9.3–26.5)
TESTOST SERPL-MCNC: 474 NG/DL (ref 264–916)

## 2022-10-04 DIAGNOSIS — N52.9 IMPOTENCE: Primary | ICD-10-CM

## 2022-10-04 RX ORDER — TADALAFIL 10 MG/1
10 TABLET ORAL
Qty: 10 TABLET | Refills: 5 | Status: SHIPPED | OUTPATIENT
Start: 2022-10-04

## 2022-11-22 DIAGNOSIS — M94.0 COSTOCHONDRITIS: ICD-10-CM

## 2022-11-22 NOTE — TELEPHONE ENCOUNTER
Patient called requesting that Prednisone be prescribed again to him for his stomach issues  Patient states that he is having the same problem with it as he had on the last ov

## 2022-11-27 RX ORDER — PREDNISONE 20 MG/1
20 TABLET ORAL 2 TIMES DAILY WITH MEALS
Qty: 14 TABLET | Refills: 0 | Status: SHIPPED | OUTPATIENT
Start: 2022-11-27 | End: 2022-12-02 | Stop reason: SDUPTHER

## 2022-12-02 DIAGNOSIS — K29.30 CHRONIC SUPERFICIAL GASTRITIS WITHOUT BLEEDING: ICD-10-CM

## 2022-12-02 DIAGNOSIS — F41.9 ANXIETY: ICD-10-CM

## 2022-12-02 DIAGNOSIS — M94.0 COSTOCHONDRITIS: ICD-10-CM

## 2022-12-02 DIAGNOSIS — N52.9 IMPOTENCE: ICD-10-CM

## 2022-12-02 RX ORDER — PREDNISONE 10 MG/1
TABLET ORAL
Qty: 12 TABLET | Refills: 0 | Status: SHIPPED | OUTPATIENT
Start: 2022-12-02 | End: 2022-12-08 | Stop reason: ALTCHOICE

## 2022-12-02 RX ORDER — OMEPRAZOLE 20 MG/1
20 CAPSULE, DELAYED RELEASE ORAL
Qty: 30 CAPSULE | Refills: 5 | Status: SHIPPED | OUTPATIENT
Start: 2022-12-02 | End: 2022-12-08 | Stop reason: ALTCHOICE

## 2022-12-02 RX ORDER — TADALAFIL 10 MG/1
10 TABLET ORAL
Qty: 10 TABLET | Refills: 5 | Status: SHIPPED | OUTPATIENT
Start: 2022-12-02 | End: 2022-12-08 | Stop reason: ALTCHOICE

## 2022-12-02 RX ORDER — ESCITALOPRAM OXALATE 10 MG/1
10 TABLET ORAL DAILY
Qty: 30 TABLET | Refills: 5 | Status: SHIPPED | OUTPATIENT
Start: 2022-12-02 | End: 2022-12-08 | Stop reason: ALTCHOICE

## 2022-12-02 NOTE — TELEPHONE ENCOUNTER
Please call patient back, he needs an appointment, prednisone is not a medicine that he can take for more than 7 days like that without weaning down, I sent him a taper dose to wean down but he needs an appointment for whatever pain/inflammation he is experiencing if it does not improvement after the wean down dose  I decreased the mg to 10mg  He was taking 40mg total before and I am decreasing by 10 mg every 2 days  He can cause worse gastritis if he continues to take prednisone and maybe need to adjust his omeprazole dose

## 2022-12-06 ENCOUNTER — HOSPITAL ENCOUNTER (EMERGENCY)
Facility: HOSPITAL | Age: 27
Discharge: HOME | End: 2022-12-06
Attending: EMERGENCY MEDICINE

## 2022-12-06 ENCOUNTER — TELEPHONE (OUTPATIENT)
Dept: OTHER | Facility: OTHER | Age: 27
End: 2022-12-06

## 2022-12-06 VITALS
TEMPERATURE: 97.4 F | DIASTOLIC BLOOD PRESSURE: 65 MMHG | RESPIRATION RATE: 18 BRPM | HEART RATE: 98 BPM | OXYGEN SATURATION: 99 % | SYSTOLIC BLOOD PRESSURE: 132 MMHG

## 2022-12-06 DIAGNOSIS — R10.13 EPIGASTRIC PAIN: Primary | ICD-10-CM

## 2022-12-06 DIAGNOSIS — R11.2 NAUSEA AND VOMITING: ICD-10-CM

## 2022-12-06 DIAGNOSIS — R19.7 DIARRHEA: ICD-10-CM

## 2022-12-06 RX ORDER — ONDANSETRON 4 MG/1
4 TABLET, ORALLY DISINTEGRATING ORAL EVERY 6 HOURS PRN
Qty: 20 TABLET | Refills: 0 | Status: SHIPPED | OUTPATIENT
Start: 2022-12-06 | End: 2022-12-08 | Stop reason: ALTCHOICE

## 2022-12-06 RX ORDER — ONDANSETRON 4 MG/1
4 TABLET, ORALLY DISINTEGRATING ORAL ONCE
Status: COMPLETED | OUTPATIENT
Start: 2022-12-06 | End: 2022-12-06

## 2022-12-06 RX ORDER — SUCRALFATE 1 G/1
1 TABLET ORAL 4 TIMES DAILY
Qty: 20 TABLET | Refills: 0 | Status: SHIPPED | OUTPATIENT
Start: 2022-12-06 | End: 2022-12-08 | Stop reason: ALTCHOICE

## 2022-12-06 RX ORDER — MAGNESIUM HYDROXIDE/ALUMINUM HYDROXICE/SIMETHICONE 120; 1200; 1200 MG/30ML; MG/30ML; MG/30ML
30 SUSPENSION ORAL ONCE
Status: COMPLETED | OUTPATIENT
Start: 2022-12-06 | End: 2022-12-06

## 2022-12-06 RX ADMIN — ONDANSETRON 4 MG: 4 TABLET, ORALLY DISINTEGRATING ORAL at 08:56

## 2022-12-06 RX ADMIN — ALUMINUM HYDROXIDE, MAGNESIUM HYDROXIDE, AND SIMETHICONE 30 ML: 200; 200; 20 SUSPENSION ORAL at 08:56

## 2022-12-06 NOTE — Clinical Note
Fernanda Cardoza was seen and treated in our emergency department on 12/6/2022  Diagnosis:     Fernanda  may return to work on return date  He may return on this date: 12/08/2022         If you have any questions or concerns, please don't hesitate to call        [address], DO    ______________________________           _______________          _______________  [facility] [place]                              Date                                Time

## 2022-12-06 NOTE — ED PROVIDER NOTES
History  Chief Complaint   Patient presents with   • Abdominal Pain     Pt reports 4 days of ' stomach burning'  Reports N/V/D  Pt reports appt with doctor tomorrow; but has to come here for work note      23 y o  M w/h/o gastritis p/w x 4 days  Pt reports history of gastritis and "always" has epigastric burning  Now having N/V/D x 4 days  Sent home from work today and needs work note  Has appt with PCP tomorrow regarding gastritis  Denies F/C, headache, URI complaints, sick contacts  History provided by:  Patient   used: No    Abdominal Pain  Pain location:  Epigastric  Pain quality: burning    Pain radiates to:  Does not radiate  Duration:  4 days  Timing:  Constant  Progression:  Unchanged  Chronicity:  New  Context: not sick contacts    Relieved by:  None tried  Worsened by:  Nothing  Ineffective treatments:  None tried  Associated symptoms: diarrhea, nausea and vomiting    Associated symptoms: no cough and no fever        Prior to Admission Medications   Prescriptions Last Dose Informant Patient Reported?  Taking?   escitalopram (Lexapro) 10 mg tablet   No No   Sig: Take 1 tablet (10 mg total) by mouth daily   omeprazole (PriLOSEC) 20 mg delayed release capsule   No No   Sig: Take 1 capsule (20 mg total) by mouth daily before breakfast   predniSONE 10 mg tablet   No No   Sig: Take 3 tablets (30mg) by mouth x 2 days, then take 2 tablets (20mg) by mouth x 2 days, then 1 tablet (10mg) by mouth x 2 days, then stop   tadalafil (CIALIS) 10 MG tablet   No No   Sig: Take 1 tablet (10 mg total) by mouth every third day      Facility-Administered Medications: None       Past Medical History:   Diagnosis Date   • COVID    • Duodenitis    • Gastric ulcer    • Gastritis        Past Surgical History:   Procedure Laterality Date   • COLONOSCOPY  2022    normal   • ESOPHAGOGASTRODUODENOSCOPY  08/2018    duodenitis, repeat done 2022       Family History   Problem Relation Age of Onset   • Hypertension Mother      I have reviewed and agree with the history as documented  E-Cigarette/Vaping   • E-Cigarette Use Never User      E-Cigarette/Vaping Substances   • Nicotine No    • THC No    • CBD No    • Flavoring No    • Other No    • Unknown No      Social History     Tobacco Use   • Smoking status: Some Days     Types: Pipe   • Smokeless tobacco: Never   • Tobacco comments:     Hookah   Vaping Use   • Vaping Use: Never used   Substance Use Topics   • Alcohol use: Yes     Comment: occasionally   • Drug use: No       Review of Systems   Constitutional: Negative for fever  HENT: Negative for rhinorrhea  Respiratory: Negative for cough  Gastrointestinal: Positive for abdominal pain, diarrhea, nausea and vomiting  Musculoskeletal: Positive for myalgias  Neurological: Negative for headaches  All other systems reviewed and are negative  Physical Exam  Physical Exam  Vitals and nursing note reviewed  Constitutional:       General: He is not in acute distress  Appearance: Normal appearance  He is well-developed  He is not ill-appearing, toxic-appearing or diaphoretic  HENT:      Head: Normocephalic and atraumatic  Eyes:      General: No scleral icterus  Neck:      Vascular: No JVD  Trachea: Trachea normal    Cardiovascular:      Rate and Rhythm: Normal rate and regular rhythm  Heart sounds: Normal heart sounds  No murmur heard  No friction rub  Pulmonary:      Effort: Pulmonary effort is normal  No accessory muscle usage or respiratory distress  Breath sounds: Normal breath sounds  No stridor  No wheezing, rhonchi or rales  Abdominal:      General: There is no distension  Palpations: Abdomen is soft  Abdomen is not rigid  There is no mass  Tenderness: There is abdominal tenderness in the epigastric area  There is no guarding or rebound  Negative signs include Rodriguez's sign and McBurney's sign     Musculoskeletal:      Cervical back: Normal range of motion  Skin:     General: Skin is warm and dry  Coloration: Skin is not pale  Findings: No rash  Neurological:      Mental Status: He is alert  GCS: GCS eye subscore is 4  GCS verbal subscore is 5  GCS motor subscore is 6  Psychiatric:         Behavior: Behavior normal          Vital Signs  ED Triage Vitals [12/06/22 0802]   Temperature Pulse Respirations Blood Pressure SpO2   (!) 97 4 °F (36 3 °C) 98 18 132/65 99 %      Temp Source Heart Rate Source Patient Position - Orthostatic VS BP Location FiO2 (%)   Oral Monitor Sitting Left arm --      Pain Score       --           Vitals:    12/06/22 0802   BP: 132/65   Pulse: 98   Patient Position - Orthostatic VS: Sitting         Visual Acuity      ED Medications  Medications   ondansetron (ZOFRAN-ODT) dispersible tablet 4 mg (4 mg Oral Given 12/6/22 0856)   aluminum-magnesium hydroxide-simethicone (MYLANTA) oral suspension 30 mL (30 mL Oral Given 12/6/22 0856)       Diagnostic Studies  Results Reviewed     Procedure Component Value Units Date/Time    FLU/COVID - if FLU clinically relevant [[id number]] Collected: 12/06/22 0828    Lab Status:  In process Specimen: Nares from Nose Updated: 12/06/22 0831                 No orders to display              Procedures  Procedures         ED Course                                             MDM    Disposition  Final diagnoses:   Epigastric pain   Nausea and vomiting   Diarrhea     Time reflects when diagnosis was documented in both MDM as applicable and the Disposition within this note     Time User Action Codes Description Comment    12/6/2022  8:24 AM Naida Mclean Add [R10 13] Epigastric pain     12/6/2022  8:24 AM Naida Mclean Add [R11 2] Nausea and vomiting     12/6/2022  8:24 AM SUNIL Mclean	[place] Add [R19 7] Diarrhea       ED Disposition     ED Disposition   Discharge    Condition   Stable    Date/Time   Tue Dec 6, 2022  8:24 AM    Comment   Fernanda Styles discharge to home/self care                Follow-up Information    None         Patient's Medications   Discharge Prescriptions    ONDANSETRON (ZOFRAN ODT) 4 MG DISINTEGRATING TABLET    Take 1 tablet (4 mg total) by mouth every 6 (six) hours as needed for nausea or vomiting       Start Date: 12/6/2022 End Date: --       Order Dose: 4 mg       Quantity: 20 tablet    Refills: 0    SUCRALFATE (CARAFATE) 1 G TABLET    Take 1 tablet (1 g total) by mouth 4 (four) times a day       Start Date: 12/6/2022 End Date: --       Order Dose: 1 g       Quantity: 20 tablet    Refills: 0       No discharge procedures on file      PDMP Review     None          ED Provider  Electronically Signed by           [address], DO  12/06/22 6848

## 2022-12-07 ENCOUNTER — HOSPITAL ENCOUNTER (EMERGENCY)
Facility: HOSPITAL | Age: 27
Discharge: HOME | End: 2022-12-08
Attending: EMERGENCY MEDICINE

## 2022-12-07 DIAGNOSIS — R10.9 ABDOMINAL PAIN: Primary | ICD-10-CM

## 2022-12-07 RX ORDER — ACETAMINOPHEN 325 MG/1
650 TABLET ORAL ONCE
Status: COMPLETED | OUTPATIENT
Start: 2022-12-07 | End: 2022-12-07

## 2022-12-07 RX ORDER — SUCRALFATE 1 G/1
1 TABLET ORAL ONCE
Status: COMPLETED | OUTPATIENT
Start: 2022-12-07 | End: 2022-12-07

## 2022-12-07 RX ADMIN — ACETAMINOPHEN 650 MG: 325 TABLET, FILM COATED ORAL at 23:39

## 2022-12-07 RX ADMIN — SUCRALFATE 1 G: 1 TABLET ORAL at 23:39

## 2022-12-07 NOTE — TELEPHONE ENCOUNTER
Per patient's phone call, he was in the ED and was recommended to schedule follow up with PCP  Patient would like to schedule appt  ASAP

## 2022-12-07 NOTE — Clinical Note
Fernanda Benítez was seen and treated in our emergency department on 12/7/2022  Diagnosis:     Fernanda    He may return on this date: If you have any questions or concerns, please don't hesitate to call        Deborah Chaparro PA-C    ______________________________           _______________          _______________  Hospital Representative                              Date                                Time

## 2022-12-07 NOTE — TELEPHONE ENCOUNTER
Pt called, requesting a call back from  office at soonest and best convenience, to see if there's availability for an appointment sooner than scheduled appointment  Pt was is ED, due to upper abdominal pain   Please assist

## 2022-12-08 ENCOUNTER — APPOINTMENT (EMERGENCY)
Dept: CT IMAGING | Facility: HOSPITAL | Age: 27
End: 2022-12-08

## 2022-12-08 ENCOUNTER — OFFICE VISIT (OUTPATIENT)
Dept: FAMILY MEDICINE CLINIC | Facility: CLINIC | Age: 27
End: 2022-12-08

## 2022-12-08 VITALS
DIASTOLIC BLOOD PRESSURE: 72 MMHG | WEIGHT: 252 LBS | BODY MASS INDEX: 33.4 KG/M2 | HEIGHT: 73 IN | OXYGEN SATURATION: 96 % | HEART RATE: 68 BPM | SYSTOLIC BLOOD PRESSURE: 111 MMHG | RESPIRATION RATE: 20 BRPM | TEMPERATURE: 98 F

## 2022-12-08 VITALS
BODY MASS INDEX: 33.16 KG/M2 | WEIGHT: 251.32 LBS | OXYGEN SATURATION: 100 % | RESPIRATION RATE: 18 BRPM | DIASTOLIC BLOOD PRESSURE: 64 MMHG | TEMPERATURE: 98.2 F | SYSTOLIC BLOOD PRESSURE: 138 MMHG | HEART RATE: 73 BPM

## 2022-12-08 DIAGNOSIS — K29.80 DUODENITIS: ICD-10-CM

## 2022-12-08 DIAGNOSIS — R10.33 PERIUMBILICAL ABDOMINAL PAIN: Primary | ICD-10-CM

## 2022-12-08 DIAGNOSIS — Z11.59 SCREENING FOR VIRAL DISEASE: ICD-10-CM

## 2022-12-08 DIAGNOSIS — E78.2 MIXED HYPERLIPIDEMIA: ICD-10-CM

## 2022-12-08 PROBLEM — I50.9 CONGESTIVE HEART FAILURE, UNSPECIFIED HF CHRONICITY, UNSPECIFIED HEART FAILURE TYPE (HCC): Status: ACTIVE | Noted: 2022-12-08

## 2022-12-08 LAB
ALBUMIN SERPL BCP-MCNC: 3.7 G/DL (ref 3.5–5)
ALP SERPL-CCNC: 52 U/L (ref 46–116)
ALT SERPL W P-5'-P-CCNC: 39 U/L (ref 12–78)
ANION GAP SERPL CALCULATED.3IONS-SCNC: 7 MMOL/L (ref 4–13)
AST SERPL W P-5'-P-CCNC: 22 U/L (ref 5–45)
BASOPHILS # BLD AUTO: 0.05 THOUSANDS/ÂΜL (ref 0–0.1)
BASOPHILS NFR BLD AUTO: 1 % (ref 0–1)
BILIRUB SERPL-MCNC: 0.5 MG/DL (ref 0.2–1)
BUN SERPL-MCNC: 15 MG/DL (ref 5–25)
CALCIUM SERPL-MCNC: 8.4 MG/DL (ref 8.3–10.1)
CHLORIDE SERPL-SCNC: 104 MMOL/L (ref 96–108)
CO2 SERPL-SCNC: 29 MMOL/L (ref 21–32)
CREAT SERPL-MCNC: 1.08 MG/DL (ref 0.6–1.3)
EOSINOPHIL # BLD AUTO: 0.51 THOUSAND/ÂΜL (ref 0–0.61)
EOSINOPHIL NFR BLD AUTO: 7 % (ref 0–6)
ERYTHROCYTE [DISTWIDTH] IN BLOOD BY AUTOMATED COUNT: 12.1 % (ref 11.6–15.1)
GFR SERPL CREATININE-BSD FRML MDRD: 94 ML/MIN/1.73SQ M
GLUCOSE SERPL-MCNC: 124 MG/DL (ref 65–140)
HCT VFR BLD AUTO: 46.4 % (ref 36.5–49.3)
HGB BLD-MCNC: 15.5 G/DL (ref 12–17)
IMM GRANULOCYTES # BLD AUTO: 0.05 THOUSAND/UL (ref 0–0.2)
IMM GRANULOCYTES NFR BLD AUTO: 1 % (ref 0–2)
LIPASE SERPL-CCNC: 141 U/L (ref 73–393)
LYMPHOCYTES # BLD AUTO: 2.37 THOUSANDS/ÂΜL (ref 0.6–4.47)
LYMPHOCYTES NFR BLD AUTO: 33 % (ref 14–44)
MCH RBC QN AUTO: 30.4 PG (ref 26.8–34.3)
MCHC RBC AUTO-ENTMCNC: 33.4 G/DL (ref 31.4–37.4)
MCV RBC AUTO: 91 FL (ref 82–98)
MONOCYTES # BLD AUTO: 0.43 THOUSAND/ÂΜL (ref 0.17–1.22)
MONOCYTES NFR BLD AUTO: 6 % (ref 4–12)
NEUTROPHILS # BLD AUTO: 3.81 THOUSANDS/ÂΜL (ref 1.85–7.62)
NEUTS SEG NFR BLD AUTO: 52 % (ref 43–75)
NRBC BLD AUTO-RTO: 0 /100 WBCS
PLATELET # BLD AUTO: 215 THOUSANDS/UL (ref 149–390)
PMV BLD AUTO: 10.7 FL (ref 8.9–12.7)
POTASSIUM SERPL-SCNC: 3.5 MMOL/L (ref 3.5–5.3)
PROT SERPL-MCNC: 7.1 G/DL (ref 6.4–8.4)
RBC # BLD AUTO: 5.1 MILLION/UL (ref 3.88–5.62)
SODIUM SERPL-SCNC: 140 MMOL/L (ref 135–147)
WBC # BLD AUTO: 7.22 THOUSAND/UL (ref 4.31–10.16)

## 2022-12-08 RX ORDER — PANTOPRAZOLE SODIUM 40 MG/1
40 TABLET, DELAYED RELEASE ORAL
Qty: 90 TABLET | Refills: 3 | Status: SHIPPED | OUTPATIENT
Start: 2022-12-08 | End: 2022-12-13 | Stop reason: SDUPTHER

## 2022-12-08 RX ADMIN — IOHEXOL 100 ML: 300 INJECTION, SOLUTION INTRAVENOUS at 01:10

## 2022-12-08 NOTE — DISCHARGE INSTRUCTIONS
Please return to the ED for any concerns as outlined in the AVS or for any other concerns  Please follow-up with your primary care provider and gastroenterology at the contact number provided in 2 days for re-evaluation and further management  Continue acetaminophen and sucralfate as needed for symptomatic control

## 2022-12-08 NOTE — ED PROVIDER NOTES
History  Chief Complaint   Patient presents with   • Abdominal Pain     Pt reports abd pain, nausea and vomiting  Pt reports he was seen in ED recently for same s/s  Patient is a 21-year-old male with a past medical history significant for gastritis and gastric ulcer presenting to the ED with complaint of epigastric and generalized abdominal pain, nausea and vomiting  Patient reports he had endoscopy and was diagnosed with a peptic ulcer about 4 months ago for which has been taking omeprazole for  States over the last week has been having worsening epigastric pain  States he was seen in the ED 1 day ago for similar symptoms  States today the pain continued to get worse and is in the epigastric area and also generalized  Denies radiation of the pain  Reports the pain is coming in waves, currently rated at a 7/10 with Pepto-Bismol taken earlier in the day today with no significant improvement  States the pain does seem to be worse after eating  States he also had a few episodes of nonbilious, nonbloody vomit today  Denies fever, chills, cough, chest pain, shortness of breath, palpitations, dysuria, hematuria, malodorous urine, increased urinary frequency, urgency, diarrhea, melena, BRBPR, back pain, headache, confusion, rash,  weakness and any other complaint  Reports he has otherwise been feeling okay without any other specific complaints today  Has had slight decreased p o  intake today secondary to his symptoms  Denies any previous abdominal surgeries  Prior to Admission Medications   Prescriptions Last Dose Informant Patient Reported?  Taking?   escitalopram (Lexapro) 10 mg tablet   No No   Sig: Take 1 tablet (10 mg total) by mouth daily   omeprazole (PriLOSEC) 20 mg delayed release capsule   No No   Sig: Take 1 capsule (20 mg total) by mouth daily before breakfast   ondansetron (Zofran ODT) 4 mg disintegrating tablet   No No   Sig: Take 1 tablet (4 mg total) by mouth every 6 (six) hours as needed for nausea or vomiting   predniSONE 10 mg tablet   No No   Sig: Take 3 tablets (30mg) by mouth x 2 days, then take 2 tablets (20mg) by mouth x 2 days, then 1 tablet (10mg) by mouth x 2 days, then stop   sucralfate (CARAFATE) 1 g tablet   No No   Sig: Take 1 tablet (1 g total) by mouth 4 (four) times a day   tadalafil (CIALIS) 10 MG tablet   No No   Sig: Take 1 tablet (10 mg total) by mouth every third day      Facility-Administered Medications: None       Past Medical History:   Diagnosis Date   • COVID    • Duodenitis    • Gastric ulcer    • Gastritis        Past Surgical History:   Procedure Laterality Date   • COLONOSCOPY  2022    normal   • ESOPHAGOGASTRODUODENOSCOPY  08/2018    duodenitis, repeat done 2022       Family History   Problem Relation Age of Onset   • Hypertension Mother      I have reviewed and agree with the history as documented  E-Cigarette/Vaping   • E-Cigarette Use Never User      E-Cigarette/Vaping Substances   • Nicotine No    • THC No    • CBD No    • Flavoring No    • Other No    • Unknown No      Social History     Tobacco Use   • Smoking status: Some Days     Types: Pipe   • Smokeless tobacco: Never   • Tobacco comments:     Hookah   Vaping Use   • Vaping Use: Never used   Substance Use Topics   • Alcohol use: Yes     Comment: occasionally   • Drug use: No       Review of Systems   Constitutional: Negative for chills and fever  HENT: Negative for ear pain and sore throat  Eyes: Negative for pain and visual disturbance  Respiratory: Negative for cough and shortness of breath  Cardiovascular: Negative for chest pain and palpitations  Gastrointestinal: Positive for abdominal pain, nausea and vomiting  Negative for blood in stool, constipation and diarrhea  Genitourinary: Negative for difficulty urinating, dysuria, frequency, hematuria and urgency  Musculoskeletal: Negative for arthralgias and back pain  Skin: Negative for color change and rash     Neurological: Negative for seizures, syncope, weakness and headaches  Psychiatric/Behavioral: Negative for confusion  All other systems reviewed and are negative  Physical Exam  Physical Exam  Vitals and nursing note reviewed  Constitutional:       General: He is not in acute distress  Appearance: He is well-developed  He is not ill-appearing or toxic-appearing  HENT:      Head: Normocephalic and atraumatic  Mouth/Throat:      Mouth: Mucous membranes are moist       Pharynx: Oropharynx is clear  Eyes:      Conjunctiva/sclera: Conjunctivae normal    Cardiovascular:      Rate and Rhythm: Normal rate and regular rhythm  Heart sounds: No murmur heard  Pulmonary:      Effort: Pulmonary effort is normal  No respiratory distress  Breath sounds: Normal breath sounds  Abdominal:      Palpations: Abdomen is soft  Tenderness: There is no abdominal tenderness  Comments: Mild generalized TTP including over the epigastric area  No point TTP  No guarding or rebound  (-) CVA TTP b/l  Genitourinary:     Comments: Deferred  Musculoskeletal:         General: No swelling  Cervical back: Neck supple  Skin:     General: Skin is warm and dry  Capillary Refill: Capillary refill takes less than 2 seconds  Neurological:      Mental Status: He is alert     Psychiatric:         Mood and Affect: Mood normal          Vital Signs  ED Triage Vitals   Temperature Pulse Respirations Blood Pressure SpO2   12/07/22 2303 12/07/22 2303 12/07/22 2303 12/07/22 2303 12/07/22 2303   98 2 °F (36 8 °C) 84 20 155/98 98 %      Temp Source Heart Rate Source Patient Position - Orthostatic VS BP Location FiO2 (%)   12/07/22 2303 12/07/22 2303 12/08/22 0218 12/07/22 2303 --   Oral Monitor Sitting Right arm       Pain Score       12/07/22 2339       8           Vitals:    12/07/22 2303 12/08/22 0218   BP: 155/98 138/64   Pulse: 84 73   Patient Position - Orthostatic VS:  Sitting         Visual Acuity      ED Medications  Medications   acetaminophen (TYLENOL) tablet 650 mg (650 mg Oral Given 12/7/22 2339)   sucralfate (CARAFATE) tablet 1 g (1 g Oral Given 12/7/22 2339)   iohexol (OMNIPAQUE) 300 mg/mL injection 100 mL (100 mL Intravenous Given 12/8/22 0110)       Diagnostic Studies  Results Reviewed     Procedure Component Value Units Date/Time    Comprehensive metabolic panel [[id number]] Collected: 12/07/22 2344    Lab Status: Final result Specimen: Blood from Arm, Right Updated: 12/08/22 0032     Sodium 140 mmol/L      Potassium 3 5 mmol/L      Chloride 104 mmol/L      CO2 29 mmol/L      ANION GAP 7 mmol/L      BUN 15 mg/dL      Creatinine 1 08 mg/dL      Glucose 124 mg/dL      Calcium 8 4 mg/dL      AST 22 U/L      ALT 39 U/L      Alkaline Phosphatase 52 U/L      Total Protein 7 1 g/dL      Albumin 3 7 g/dL      Total Bilirubin 0 50 mg/dL      eGFR 94 ml/min/1 73sq m     Narrative:      [place] guidelines for Chronic Kidney Disease (CKD):   •  Stage 1 with normal or high GFR (GFR > 90 mL/min/1 73 square meters)  •  Stage 2 Mild CKD (GFR = 60-89 mL/min/1 73 square meters)  •  Stage 3A Moderate CKD (GFR = 45-59 mL/min/1 73 square meters)  •  Stage 3B Moderate CKD (GFR = 30-44 mL/min/1 73 square meters)  •  Stage 4 Severe CKD (GFR = 15-29 mL/min/1 73 square meters)  •  Stage 5 End Stage CKD (GFR <15 mL/min/1 73 square meters)  Note: GFR calculation is accurate only with a steady state creatinine    Lipase [[id number]]  (Normal) Collected: 12/07/22 2344    Lab Status: Final result Specimen: Blood from Arm, Right Updated: 12/08/22 0032     Lipase 141 u/L     CBC and differential [[id number]]  (Abnormal) Collected: 12/07/22 2344    Lab Status: Final result Specimen: Blood from Arm, Right Updated: 12/08/22 0003     WBC 7 22 Thousand/uL      RBC 5 10 Million/uL      Hemoglobin 15 5 g/dL      Hematocrit 46 4 %      MCV 91 fL      MCH 30 4 pg      MCHC 33 4 g/dL      RDW 12 1 %      MPV 10 7 fL Platelets 989 Thousands/uL      nRBC 0 /100 WBCs      Neutrophils Relative 52 %      Immat GRANS % 1 %      Lymphocytes Relative 33 %      Monocytes Relative 6 %      Eosinophils Relative 7 %      Basophils Relative 1 %      Neutrophils Absolute 3 81 Thousands/µL      Immature Grans Absolute 0 05 Thousand/uL      Lymphocytes Absolute 2 37 Thousands/µL      Monocytes Absolute 0 43 Thousand/µL      Eosinophils Absolute 0 51 Thousand/µL      Basophils Absolute 0 05 Thousands/µL                  CT abdomen pelvis with contrast   Final Result by Raheem Latham MD (12/08 0125)      No acute abnormality            Workstation performed: [id number]                    Procedures  Procedures         ED Course  ED Course as of 12/08/22 0242   u Dec 08, 2022   0132 Lipase: 141   0133 WBC: 7 22 0135 CT IMPRESSION:     No acute abnormality   0135 Creatinine: 1 08   0226 No acute findings on w/u today to account for the pt's symptoms  On bedside reevaluation patient resting in the stretcher in no acute distress  Reports significant improvement of his pain  No new complaints  States he feels well to go home  Has been able to maintain p o  intake  Verbally communicated all findings with the patient who verbalizes his understanding  Advised to follow-up with GI for reevaluation and further management, contact information provided  Advised to continue Carafate as needed for symptomatic control  Strict return precautions verbally communicated to the patient as outlined in the AVS   All patient questions and concerns were answered  Patient verbally communicated their understanding and agreement to the above plan  Patient stable at discharge                               SBIRT 22yo+    Flowsheet Row Most Recent Value   SBIRT (23 yo +)    In order to provide better care to our patients, we are screening all of our patients for alcohol and drug use  Would it be okay to ask you these screening questions?  No Filed at: 12/07/2022 2318                    [facility]  Number of Diagnoses or Management Options     Amount and/or Complexity of Data Reviewed  Clinical lab tests: ordered and reviewed  Tests in the radiology section of CPT®: ordered and reviewed    Risk of Complications, Morbidity, and/or Mortality  General comments: Patient presening to the ED with worsening abdominal pain over the last week  Was seen in the ED 1 day ago but reports symptoms are not improving  Will obtain CBC, CMP, lipase and CT A/P for further evaluation of patient's symptoms  Will treat symptomatically with acetaminophen, sucralfate and fluids  Disposition pending labs, imaging and reevaluation  Patient Progress  Patient progress: stable      Disposition  Final diagnoses:   Abdominal pain     Time reflects when diagnosis was documented in both MDM as applicable and the Disposition within this note     Time User Action Codes Description Comment    12/8/2022  2:25 AM Lor Reid Add [R10 9] Abdominal pain       ED Disposition     ED Disposition   Discharge    Condition   Stable    Date/Time   Thu Dec 8, 2022  2:25 AM    Comment   Fernanda Merida discharge to home/self care                 Follow-up Information     Follow up With Specialties Details Why Contact Info Additional [facility] [place] [facility] Call in 1 day For further evaluation [address]  [address] [facility] [address]  Corneliustika Guardado [address] [facility] [place], [address], [address], [place], [address] [facility], [address] [phone]          Discharge Medication List as of 12/8/2022  2:30 AM      CONTINUE these medications which have NOT CHANGED    Details   escitalopram (Lexapro) 10 mg tablet Take 1 tablet (10 mg total) by mouth daily, Starting Fri 12/2/2022, Normal      omeprazole (PriLOSEC) 20 mg delayed release capsule Take 1 capsule (20 mg total) by mouth daily before breakfast, Starting Fri 12/2/2022, Normal      ondansetron (Zofran ODT) 4 mg disintegrating tablet Take 1 tablet (4 mg total) by mouth every 6 (six) hours as needed for nausea or vomiting, Starting Tue 12/6/2022, Print      predniSONE 10 mg tablet Take 3 tablets (30mg) by mouth x 2 days, then take 2 tablets (20mg) by mouth x 2 days, then 1 tablet (10mg) by mouth x 2 days, then stop, Normal      sucralfate (CARAFATE) 1 g tablet Take 1 tablet (1 g total) by mouth 4 (four) times a day, Starting Tue 12/6/2022, Print      tadalafil (CIALIS) 10 MG tablet Take 1 tablet (10 mg total) by mouth every third day, Starting Fri 12/2/2022, Normal             No discharge procedures on file      PDMP Review     None          ED Provider  Electronically Signed by           Sunday Fu PA-C  12/08/22 1161

## 2022-12-08 NOTE — ASSESSMENT & PLAN NOTE
Wt Readings from Last 3 Encounters:   12/08/22 114 kg (252 lb)   12/07/22 114 kg (251 lb 5 2 oz)   09/14/22 112 kg (246 lb 12 8 oz)     Controlled, dx deleted

## 2022-12-08 NOTE — PROGRESS NOTES
Name: Autumn Morin      : 1995      MRN: [id number]  Encounter Provider: Derrick Nuñez MD  Encounter Date: 2022   Encounter department: [facility]   Abdominal pain, Unsp  Previous EGD: duodenitis, recommended continue on PPI until seen GI specialist   Checking for gluten allergy  Referral to GI  Most likely IBS  Exercise and weight control stressed  1  Periumbilical abdominal pain  -     Gluten IgE; Future    2  Screening for viral disease  -     Hepatitis A antibody, total; Future    3  Mixed hyperlipidemia  -     Lipid panel; Future    4  Duodenitis  -     pantoprazole (PROTONIX) 40 mg tablet; Take 1 tablet (40 mg total) by mouth daily before breakfast  -     Ambulatory Referral to Gastroenterology; Future           Subjective      HPI     This is a chronic problem that is going on since his arrival to the [facility] in 2017  Never suffered of abdominal pain back in [facility] he is originary from  The onset quality is sudden  The problem occurs intermittently  The problem has been waxing and waning  The pain is located in the generalized abdominal region  The pain is at a severity of 5/10  The pain is moderate  The quality of the pain is colicky  Pertinent negatives include no arthralgias, dysuria, fever or headaches  Exacerbated by: stress and eating bred and pasta    The pain is relieved by nothing  He has tried nothing for the symptoms  Prior diagnostic workup includes upper endoscopy  There is no history of abdominal surgery, colon cancer, Crohn's disease, gallstones, GERD, irritable bowel syndrome, pancreatitis, PUD or ulcerative colitis  He recalls suffering of hunger when his brother will not allow him to eat  He was in so much stress  States is better from stress, is  and have 1 daughter and another in the way  He does not have pain right now      Review of Systems   Constitutional: Negative for diaphoresis, fatigue, fever and unexpected weight change  Respiratory: Negative for apnea, cough, choking, chest tightness and shortness of breath  Cardiovascular: Negative for chest pain, palpitations and leg swelling  Gastrointestinal: Negative for abdominal distention, abdominal pain, anal bleeding, blood in stool and constipation  Musculoskeletal: Negative for arthralgias, back pain, gait problem and joint swelling  Neurological: Negative for dizziness, facial asymmetry, light-headedness and headaches  Psychiatric/Behavioral: Negative for behavioral problems, dysphoric mood and self-injury  The patient is not nervous/anxious  Current Outpatient Medications on File Prior to Visit   Medication Sig   • [DISCONTINUED] bisacodyl (DULCOLAX) 5 mg EC tablet Take 1 tablet (5 mg total) by mouth see administration instructions Take 2 tablets at 12:00 p m  on day prior to colonoscopy  Take 2 tablets at 4:00 p m  on day prior to colonoscopy   • [DISCONTINUED] polyethylene glycol (GLYCOLAX) 17 GM/SCOOP powder Take 238 g by mouth see administration instructions Mix 238 g with 64 oz of clear liquid  Drink half starting at noon on the day prior to colonoscopy  Drink remaining half 6 hours prior to procedure on day of colonoscopy  Objective     /72 (BP Location: Left arm, Patient Position: Sitting, Cuff Size: Standard)   Pulse 68   Temp 98 °F (36 7 °C) (Tympanic)   Resp 20   Ht 6' 1" (1 854 m)   Wt 114 kg (252 lb)   SpO2 96%   BMI 33 25 kg/m²     Physical Exam  Vitals and nursing note reviewed  Constitutional:       Comments: Body mass index is 33 25 kg/m²  Neck:      Thyroid: No thyroid mass or thyromegaly  Vascular: No carotid bruit or JVD  Trachea: No tracheal tenderness  Cardiovascular:      Rate and Rhythm: Normal rate and regular rhythm  No extrasystoles are present  Pulses: Normal pulses  Heart sounds: Normal heart sounds   Heart sounds not distant  No friction rub  Pulmonary:      Effort: Pulmonary effort is normal  No tachypnea or bradypnea  Breath sounds: Normal breath sounds  No stridor  Abdominal:      General: Bowel sounds are normal  There is no abdominal bruit  Palpations: Abdomen is soft  There is no hepatomegaly or splenomegaly  Hernia: No hernia is present  Musculoskeletal:         General: Normal range of motion  Cervical back: No edema or rigidity  Skin:     General: Skin is warm and dry  Neurological:      Mental Status: He is oriented to person, place, and time  Deep Tendon Reflexes: Reflexes are normal and symmetric  Psychiatric:         Behavior: Behavior normal          Thought Content:  Thought content normal          Judgment: Judgment normal        Robert Crow MD

## 2022-12-08 NOTE — LETTER
December 8, 2022     Patient: Gregorio Nascimento  YOB: 1995  Date of Visit: 12/8/2022      To Whom it May Concern:    Fernanda Nova is under my professional care  Fernanda was seen in my office on 12/8/2022  Fernanda may return to work on 12/12/2022  If you have any questions or concerns, please don't hesitate to call           Sincerely,          Alin Carreno MD

## 2022-12-10 ENCOUNTER — HOSPITAL ENCOUNTER (EMERGENCY)
Facility: HOSPITAL | Age: 27
Discharge: HOME | End: 2022-12-10
Attending: EMERGENCY MEDICINE

## 2022-12-10 VITALS
DIASTOLIC BLOOD PRESSURE: 88 MMHG | TEMPERATURE: 97.4 F | OXYGEN SATURATION: 99 % | SYSTOLIC BLOOD PRESSURE: 139 MMHG | HEART RATE: 66 BPM | RESPIRATION RATE: 18 BRPM | BODY MASS INDEX: 33.22 KG/M2 | WEIGHT: 251.77 LBS

## 2022-12-10 DIAGNOSIS — R10.13 EPIGASTRIC PAIN: Primary | ICD-10-CM

## 2022-12-10 DIAGNOSIS — R11.0 NAUSEA: ICD-10-CM

## 2022-12-10 RX ORDER — MAGNESIUM HYDROXIDE/ALUMINUM HYDROXICE/SIMETHICONE 120; 1200; 1200 MG/30ML; MG/30ML; MG/30ML
30 SUSPENSION ORAL ONCE
Status: COMPLETED | OUTPATIENT
Start: 2022-12-10 | End: 2022-12-10

## 2022-12-10 RX ORDER — METOCLOPRAMIDE 10 MG/1
10 TABLET ORAL ONCE
Status: COMPLETED | OUTPATIENT
Start: 2022-12-10 | End: 2022-12-10

## 2022-12-10 RX ORDER — FAMOTIDINE 20 MG/1
20 TABLET, FILM COATED ORAL ONCE
Status: COMPLETED | OUTPATIENT
Start: 2022-12-10 | End: 2022-12-10

## 2022-12-10 RX ORDER — ALUMINA, MAGNESIA, AND SIMETHICONE 2400; 2400; 240 MG/30ML; MG/30ML; MG/30ML
10 SUSPENSION ORAL EVERY 6 HOURS PRN
Qty: 355 ML | Refills: 0 | Status: SHIPPED | OUTPATIENT
Start: 2022-12-10

## 2022-12-10 RX ORDER — FAMOTIDINE 20 MG/1
20 TABLET, FILM COATED ORAL 2 TIMES DAILY
Qty: 30 TABLET | Refills: 0 | Status: SHIPPED | OUTPATIENT
Start: 2022-12-10

## 2022-12-10 RX ADMIN — METOCLOPRAMIDE 10 MG: 10 TABLET ORAL at 16:37

## 2022-12-10 RX ADMIN — ALUMINUM HYDROXIDE, MAGNESIUM HYDROXIDE, AND SIMETHICONE 30 ML: 200; 200; 20 SUSPENSION ORAL at 16:36

## 2022-12-10 RX ADMIN — FAMOTIDINE 20 MG: 20 TABLET ORAL at 16:37

## 2022-12-10 NOTE — ED PROVIDER NOTES
History  Chief Complaint   Patient presents with   • Abdominal Pain     Abd pain and nausea x1 week seen here for same multiple times      23 y o  M p/w epigastric pain  Pt was in this ER twice for the same complaint on 12/6 and 12/7 in which he had labs and a negative CT A/P  Pt was seen by his PCP 2 days ago for the same and was referred to GI  He was instructed to continue his PPI until evaluated by GI  He has not made the appt with GI yet  He states the PPI is not working  He states the pain is the same and unchanged  Worse after eating  History provided by:  Patient   used: No    Abdominal Pain  Pain location:  Epigastric  Pain quality: burning    Chronicity:  Recurrent  Relieved by:  Nothing  Worsened by:  Nothing  Ineffective treatments: PPI  Associated symptoms: nausea    Associated symptoms: no diarrhea and no vomiting        Prior to Admission Medications   Prescriptions Last Dose Informant Patient Reported? Taking? pantoprazole (PROTONIX) 40 mg tablet   No No   Sig: Take 1 tablet (40 mg total) by mouth daily before breakfast      Facility-Administered Medications: None       Past Medical History:   Diagnosis Date   • COVID    • Duodenitis    • Gastric ulcer    • Gastritis        Past Surgical History:   Procedure Laterality Date   • COLONOSCOPY  2022    normal   • ESOPHAGOGASTRODUODENOSCOPY  08/2018    duodenitis, repeat done 2022       Family History   Problem Relation Age of Onset   • Hypertension Mother      I have reviewed and agree with the history as documented  E-Cigarette/Vaping   • E-Cigarette Use Never User      E-Cigarette/Vaping Substances   • Nicotine No    • THC No    • CBD No    • Flavoring No    • Other No    • Unknown No      Social History     Tobacco Use   • Smoking status: Some Days     Types: Pipe   • Smokeless tobacco: Never   • Tobacco comments:     Hookah   Vaping Use   • Vaping Use: Never used   Substance Use Topics   • Alcohol use:  Yes Comment: occasionally   • Drug use: No       Review of Systems   Gastrointestinal: Positive for abdominal pain and nausea  Negative for diarrhea and vomiting  All other systems reviewed and are negative  Physical Exam  Physical Exam  Vitals and nursing note reviewed  Constitutional:       General: He is not in acute distress  Appearance: Normal appearance  He is well-developed  He is not ill-appearing, toxic-appearing or diaphoretic  HENT:      Head: Normocephalic and atraumatic  Eyes:      General: No scleral icterus  Neck:      Vascular: No JVD  Trachea: Trachea normal    Cardiovascular:      Rate and Rhythm: Normal rate and regular rhythm  Heart sounds: Normal heart sounds  No murmur heard  No friction rub  Pulmonary:      Effort: Pulmonary effort is normal  No accessory muscle usage or respiratory distress  Breath sounds: Normal breath sounds  No stridor  No wheezing, rhonchi or rales  Abdominal:      General: There is no distension  Palpations: Abdomen is soft  Abdomen is not rigid  There is no mass  Tenderness: There is abdominal tenderness in the epigastric area  There is no guarding or rebound  Negative signs include Rodriguez's sign and McBurney's sign  Musculoskeletal:      Cervical back: Normal range of motion  Skin:     General: Skin is warm and dry  Coloration: Skin is not pale  Findings: No rash  Neurological:      Mental Status: He is alert  GCS: GCS eye subscore is 4  GCS verbal subscore is 5  GCS motor subscore is 6     Psychiatric:         Behavior: Behavior normal          Vital Signs  ED Triage Vitals [12/10/22 1608]   Temperature Pulse Respirations Blood Pressure SpO2   (!) 97 4 °F (36 3 °C) 66 18 139/88 99 %      Temp Source Heart Rate Source Patient Position - Orthostatic VS BP Location FiO2 (%)   Oral Monitor Sitting Right arm --      Pain Score       10 - Worst Possible Pain           Vitals:    12/10/22 1608   BP: 139/88 Pulse: 66   Patient Position - Orthostatic VS: Sitting         Visual Acuity      ED Medications  Medications   metoclopramide (REGLAN) tablet 10 mg (10 mg Oral Given 12/10/22 1637)   famotidine (PEPCID) tablet 20 mg (20 mg Oral Given 12/10/22 1637)   aluminum-magnesium hydroxide-simethicone (MYLANTA) oral suspension 30 mL (30 mL Oral Given 12/10/22 1636)       Diagnostic Studies  Results Reviewed     None                 No orders to display              Procedures  Procedures         ED Course  ED Course as of 12/10/22 1646   Sat Dec 10, 2022   1643 Pt states he's just here to get meds and is asking to leave  SBIRT 20yo+    Flowsheet Row Most Recent Value   SBIRT (23 yo +)    In order to provide better care to our patients, we are screening all of our patients for alcohol and drug use  Would it be okay to ask you these screening questions? Unable to answer at this time Filed at: 12/10/2022 1638                    MDM    Disposition  Final diagnoses:   Epigastric pain   Nausea     Time reflects when diagnosis was documented in both MDM as applicable and the Disposition within this note     Time User Action Codes Description Comment    12/10/2022  4:41 PM Cuca Mclean 48 [R10 13] Epigastric pain     12/10/2022  4:41 PM Cuca Mclean 48 [R11 0] Nausea       ED Disposition     ED Disposition   Discharge    Condition   Stable    Date/Time   Sat Dec 10, 2022  4:42 PM    Comment   Fernanda Candelaria discharge to home/self care                 Follow-up Information     Follow up With Specialties Details Why Contact Info Additional [facility] Schedule an appointment as soon as possible for a visit   [address] [address] [phone] [facility], [address], [address] [place] [address], [place], [place], [address] [phone] Discharge Medication List as of 12/10/2022  4:42 PM      START taking these medications    Details   aluminum-magnesium hydroxide-simethicone (MAALOX MAX) 400-400-40 MG/5ML suspension Take 10 mL by mouth every 6 (six) hours as needed for indigestion or heartburn, Starting Sat 12/10/2022, Normal      famotidine (PEPCID) 20 mg tablet Take 1 tablet (20 mg total) by mouth 2 (two) times a day, Starting Sat 12/10/2022, Normal         CONTINUE these medications which have NOT CHANGED    Details   pantoprazole (PROTONIX) 40 mg tablet Take 1 tablet (40 mg total) by mouth daily before breakfast, Starting Thu 12/8/2022, Normal             No discharge procedures on file      PDMP Review     None          ED Provider  Electronically Signed by           [address], DO  12/10/22 0414

## 2022-12-12 ENCOUNTER — TELEPHONE (OUTPATIENT)
Dept: OTHER | Facility: OTHER | Age: 27
End: 2022-12-12

## 2022-12-12 NOTE — TELEPHONE ENCOUNTER
Patient is requesting a call back to schedule an appointment  He states he was referred by his doctor      Patient only speaks Surinamese

## 2022-12-13 ENCOUNTER — CONSULT (OUTPATIENT)
Dept: GASTROENTEROLOGY | Facility: CLINIC | Age: 27
End: 2022-12-13

## 2022-12-13 VITALS
HEIGHT: 73 IN | TEMPERATURE: 98.6 F | DIASTOLIC BLOOD PRESSURE: 70 MMHG | BODY MASS INDEX: 32.74 KG/M2 | SYSTOLIC BLOOD PRESSURE: 108 MMHG | WEIGHT: 247 LBS | HEART RATE: 77 BPM

## 2022-12-13 DIAGNOSIS — K27.9 PUD (PEPTIC ULCER DISEASE): Primary | ICD-10-CM

## 2022-12-13 DIAGNOSIS — R10.13 EPIGASTRIC PAIN: ICD-10-CM

## 2022-12-13 DIAGNOSIS — K29.80 DUODENITIS: ICD-10-CM

## 2022-12-13 RX ORDER — PANTOPRAZOLE SODIUM 40 MG/1
40 TABLET, DELAYED RELEASE ORAL 2 TIMES DAILY
Qty: 60 TABLET | Refills: 3 | Status: SHIPPED | OUTPATIENT
Start: 2022-12-13

## 2022-12-13 NOTE — H&P (VIEW-ONLY)
[facility] - Outpatient Consultation  Yocasta Chow [age] y o  male MRN: [id number]  Encounter: [id number]          ASSESSMENT AND PLAN:      Cathy Arthur is a 22 y/o male with PUD who presents for consultation of periumbilical pain  1  PUD  2  Epigastric Pain  Pt underwent EGD 5/2022 for his symptoms, which depicted multiple clean-based ulcers in pylorus, duodenal bulb and first part of duodenum; it also depicted erythema and granular mucosa within the stomach  Duodenal bx noted peptic duodenitis and gastric bx (of the ulcer and random gastric bx) both negative for H pylori  Pt says he was told to take omeprazole 20 mg once/day and was never told to undergo repeat EGD  He was just started on protonix 40 mg once/day last week  Unfortunately, he continues to have epigastric pain almost daily and notes that he does have NBNB n/v when the pain is severe    -I explained to pt that due to hid recent PUD findings, his PPI regimen needs to be increased ASAP with repeat EGD  -increase protonix 40 mg to BID  -repeat EGD ordered to evaluate his ulcers as I suspect that they are not properly healed due to not being on the correct tx  -anti-ulcerogenic diet discussed     ______________________________________________________________________    HPI:  Fernanda is a 22 y/o male with PUD who presents for consultation of periumbilical pain  Pt says that for the last several months-year, he has been having epigastric pain almost daily  He says he was having n/v associated with this, as well, but this is occurring only when the pain is very severe  Pt says he did have BRB in his emesis when his symptoms first began but has not had any since  Pt denies lower abdominal pain, constipation, diarrhea, or any further bloody or black BMs  Pt denies NSAID use  Pt denies unintentional weight loss, fevers, chills, night sweats  Pt denies prior abdominal surgical hx  Pt denies alcohol, tobacco and illicit drug use  REVIEW OF SYSTEMS:    CONSTITUTIONAL: Denies any fever, chills, rigors, and weight loss  HEENT: No earache or tinnitus  Denies hearing loss or visual disturbances  CARDIOVASCULAR: No chest pain or palpitations  RESPIRATORY: Denies any cough, hemoptysis, shortness of breath or dyspnea on exertion  GASTROINTESTINAL: As noted in the History of Present Illness  GENITOURINARY: No problems with urination  Denies any hematuria or dysuria  NEUROLOGIC: No dizziness or vertigo, denies headaches  MUSCULOSKELETAL: Denies any muscle or joint pain  SKIN: Denies skin rashes or itching  ENDOCRINE: Denies excessive thirst  Denies intolerance to heat or cold  PSYCHOSOCIAL: Denies depression or anxiety  Denies any recent memory loss  Historical Information   Past Medical History:   Diagnosis Date   • COVID    • Duodenitis    • Gastric ulcer    • Gastritis      Past Surgical History:   Procedure Laterality Date   • COLONOSCOPY  2022    normal   • ESOPHAGOGASTRODUODENOSCOPY  08/2018    duodenitis, repeat done 2022     Social History   Social History     Substance and Sexual Activity   Alcohol Use Yes    Comment: occasionally     Social History     Substance and Sexual Activity   Drug Use No     Social History     Tobacco Use   Smoking Status Some Days   • Types: Pipe   Smokeless Tobacco Never   Tobacco Comments    Hookah     Family History   Problem Relation Age of Onset   • Hypertension Mother        Meds/Allergies       Current Outpatient Medications:   •  aluminum-magnesium hydroxide-simethicone (MAALOX MAX) 400-400-40 MG/5ML suspension  •  famotidine (PEPCID) 20 mg tablet  •  pantoprazole (PROTONIX) 40 mg tablet    Allergies   Allergen Reactions   • Aspirin Rash   • Cyclobenzaprine Rash           Objective     Blood pressure 108/70, pulse 77, temperature 98 6 °F (37 °C), temperature source Tympanic, height 6' 1" (1 854 m), weight 112 kg (247 lb)  Body mass index is 32 59 kg/m²          PHYSICAL EXAM: General Appearance:   Alert, cooperative, no distress   HEENT:   Normocephalic, atraumatic, anicteric      Neck:  Supple, symmetrical, trachea midline   Lungs:   Clear to auscultation bilaterally; no rales, rhonchi or wheezing; respirations unlabored    Heart[de-identified]   Regular rate and rhythm; no murmur, rub, or gallop  Abdomen:   Soft, non-tender, non-distended; normal bowel sounds; no masses, no organomegaly    Genitalia:   Deferred    Rectal:   Deferred    Extremities:  No cyanosis, clubbing or edema    Pulses:  2+ and symmetric    Skin:  No jaundice, rashes, or lesions    Lymph nodes:  No palpable cervical lymphadenopathy        Lab Results:   No visits with results within 1 Day(s) from this visit     Latest known visit with results is:   Admission on 12/07/2022, Discharged on 12/08/2022   Component Date Value   • WBC 12/07/2022 7 22    • RBC 12/07/2022 5 10    • Hemoglobin 12/07/2022 15 5    • Hematocrit 12/07/2022 46 4    • MCV 12/07/2022 91    • MCH 12/07/2022 30 4    • MCHC 12/07/2022 33 4    • RDW 12/07/2022 12 1    • MPV 12/07/2022 10 7    • Platelets 12/05/3128 215    • nRBC 12/07/2022 0    • Neutrophils Relative 12/07/2022 52    • Immat GRANS % 12/07/2022 1    • Lymphocytes Relative 12/07/2022 33    • Monocytes Relative 12/07/2022 6    • Eosinophils Relative 12/07/2022 7 (H)    • Basophils Relative 12/07/2022 1    • Neutrophils Absolute 12/07/2022 3 81    • Immature Grans Absolute 12/07/2022 0 05    • Lymphocytes Absolute 12/07/2022 2 37    • Monocytes Absolute 12/07/2022 0 43    • Eosinophils Absolute 12/07/2022 0 51    • Basophils Absolute 12/07/2022 0 05    • Sodium 12/07/2022 140    • Potassium 12/07/2022 3 5    • Chloride 12/07/2022 104    • CO2 12/07/2022 29    • ANION GAP 12/07/2022 7    • BUN 12/07/2022 15    • Creatinine 12/07/2022 1 08    • Glucose 12/07/2022 124    • Calcium 12/07/2022 8 4    • AST 12/07/2022 22    • ALT 12/07/2022 39    • Alkaline Phosphatase 12/07/2022 52    • Total Protein 12/07/2022 7 1    • Albumin 12/07/2022 3 7    • Total Bilirubin 12/07/2022 0 50    • eGFR 12/07/2022 94    • Lipase 12/07/2022 141          Radiology Results:   CT abdomen pelvis with contrast    Result Date: 12/8/2022  Narrative: CT ABDOMEN AND PELVIS WITH IV CONTRAST INDICATION:   epigastric and generalized abdominal pain  Magaly Tovar History of prostatic ulcer disease COMPARISON:  CT 1/16/18, ultrasound 4/15/22 TECHNIQUE:  CT examination of the abdomen and pelvis was performed  Axial, sagittal, and coronal 2D reformatted images were created from the source data and submitted for interpretation  Radiation dose length product (DLP) for this visit:  805 67 mGy-cm   This examination, like all CT scans performed in the [facility], was performed utilizing techniques to minimize radiation dose exposure, including the use of iterative  reconstruction and automated exposure control  IV Contrast:  100 mL of iohexol (OMNIPAQUE) Enteric Contrast:  Enteric contrast was not administered  FINDINGS: ABDOMEN LOWER CHEST:  No clinically significant abnormality identified in the visualized lower chest  LIVER/BILIARY TREE:  Unremarkable  GALLBLADDER:  No calcified gallstones  No pericholecystic inflammatory change  SPLEEN:  Unremarkable  PANCREAS:  Unremarkable  ADRENAL GLANDS:  Unremarkable  KIDNEYS/URETERS:  Unremarkable  No hydronephrosis  STOMACH AND BOWEL:  Unremarkable  APPENDIX:  No findings to suggest appendicitis  ABDOMINOPELVIC CAVITY:  No ascites  No pneumoperitoneum  No lymphadenopathy  VESSELS:  Unremarkable for patient's age  PELVIS REPRODUCTIVE ORGANS:  Unremarkable for patient's age  URINARY BLADDER:  Unremarkable  ABDOMINAL WALL/INGUINAL REGIONS:  Unremarkable  OSSEOUS STRUCTURES:  No acute fracture or destructive osseous lesion       Impression: No acute abnormality Workstation performed: [id number]

## 2022-12-13 NOTE — PROGRESS NOTES
[facility] - Outpatient Consultation  Srikanth Chow [age] y o  male MRN: [id number]  Encounter: [id number]          ASSESSMENT AND PLAN:      Florin Aden is a 24 y/o male with PUD who presents for consultation of periumbilical pain  1  PUD  2  Epigastric Pain  Pt underwent EGD 5/2022 for his symptoms, which depicted multiple clean-based ulcers in pylorus, duodenal bulb and first part of duodenum; it also depicted erythema and granular mucosa within the stomach  Duodenal bx noted peptic duodenitis and gastric bx (of the ulcer and random gastric bx) both negative for H pylori  Pt says he was told to take omeprazole 20 mg once/day and was never told to undergo repeat EGD  He was just started on protonix 40 mg once/day last week  Unfortunately, he continues to have epigastric pain almost daily and notes that he does have NBNB n/v when the pain is severe    -I explained to pt that due to hid recent PUD findings, his PPI regimen needs to be increased ASAP with repeat EGD  -increase protonix 40 mg to BID  -repeat EGD ordered to evaluate his ulcers as I suspect that they are not properly healed due to not being on the correct tx  -anti-ulcerogenic diet discussed     ______________________________________________________________________    HPI:  Fernanda is a 24 y/o male with PUD who presents for consultation of periumbilical pain  Pt says that for the last several months-year, he has been having epigastric pain almost daily  He says he was having n/v associated with this, as well, but this is occurring only when the pain is very severe  Pt says he did have BRB in his emesis when his symptoms first began but has not had any since  Pt denies lower abdominal pain, constipation, diarrhea, or any further bloody or black BMs  Pt denies NSAID use  Pt denies unintentional weight loss, fevers, chills, night sweats  Pt denies prior abdominal surgical hx  Pt denies alcohol, tobacco and illicit drug use  REVIEW OF SYSTEMS:    CONSTITUTIONAL: Denies any fever, chills, rigors, and weight loss  HEENT: No earache or tinnitus  Denies hearing loss or visual disturbances  CARDIOVASCULAR: No chest pain or palpitations  RESPIRATORY: Denies any cough, hemoptysis, shortness of breath or dyspnea on exertion  GASTROINTESTINAL: As noted in the History of Present Illness  GENITOURINARY: No problems with urination  Denies any hematuria or dysuria  NEUROLOGIC: No dizziness or vertigo, denies headaches  MUSCULOSKELETAL: Denies any muscle or joint pain  SKIN: Denies skin rashes or itching  ENDOCRINE: Denies excessive thirst  Denies intolerance to heat or cold  PSYCHOSOCIAL: Denies depression or anxiety  Denies any recent memory loss  Historical Information   Past Medical History:   Diagnosis Date   • COVID    • Duodenitis    • Gastric ulcer    • Gastritis      Past Surgical History:   Procedure Laterality Date   • COLONOSCOPY  2022    normal   • ESOPHAGOGASTRODUODENOSCOPY  08/2018    duodenitis, repeat done 2022     Social History   Social History     Substance and Sexual Activity   Alcohol Use Yes    Comment: occasionally     Social History     Substance and Sexual Activity   Drug Use No     Social History     Tobacco Use   Smoking Status Some Days   • Types: Pipe   Smokeless Tobacco Never   Tobacco Comments    Hookah     Family History   Problem Relation Age of Onset   • Hypertension Mother        Meds/Allergies       Current Outpatient Medications:   •  aluminum-magnesium hydroxide-simethicone (MAALOX MAX) 400-400-40 MG/5ML suspension  •  famotidine (PEPCID) 20 mg tablet  •  pantoprazole (PROTONIX) 40 mg tablet    Allergies   Allergen Reactions   • Aspirin Rash   • Cyclobenzaprine Rash           Objective     Blood pressure 108/70, pulse 77, temperature 98 6 °F (37 °C), temperature source Tympanic, height 6' 1" (1 854 m), weight 112 kg (247 lb)  Body mass index is 32 59 kg/m²          PHYSICAL EXAM: General Appearance:   Alert, cooperative, no distress   HEENT:   Normocephalic, atraumatic, anicteric      Neck:  Supple, symmetrical, trachea midline   Lungs:   Clear to auscultation bilaterally; no rales, rhonchi or wheezing; respirations unlabored    Heart[de-identified]   Regular rate and rhythm; no murmur, rub, or gallop  Abdomen:   Soft, non-tender, non-distended; normal bowel sounds; no masses, no organomegaly    Genitalia:   Deferred    Rectal:   Deferred    Extremities:  No cyanosis, clubbing or edema    Pulses:  2+ and symmetric    Skin:  No jaundice, rashes, or lesions    Lymph nodes:  No palpable cervical lymphadenopathy        Lab Results:   No visits with results within 1 Day(s) from this visit     Latest known visit with results is:   Admission on 12/07/2022, Discharged on 12/08/2022   Component Date Value   • WBC 12/07/2022 7 22    • RBC 12/07/2022 5 10    • Hemoglobin 12/07/2022 15 5    • Hematocrit 12/07/2022 46 4    • MCV 12/07/2022 91    • MCH 12/07/2022 30 4    • MCHC 12/07/2022 33 4    • RDW 12/07/2022 12 1    • MPV 12/07/2022 10 7    • Platelets 94/10/5054 215    • nRBC 12/07/2022 0    • Neutrophils Relative 12/07/2022 52    • Immat GRANS % 12/07/2022 1    • Lymphocytes Relative 12/07/2022 33    • Monocytes Relative 12/07/2022 6    • Eosinophils Relative 12/07/2022 7 (H)    • Basophils Relative 12/07/2022 1    • Neutrophils Absolute 12/07/2022 3 81    • Immature Grans Absolute 12/07/2022 0 05    • Lymphocytes Absolute 12/07/2022 2 37    • Monocytes Absolute 12/07/2022 0 43    • Eosinophils Absolute 12/07/2022 0 51    • Basophils Absolute 12/07/2022 0 05    • Sodium 12/07/2022 140    • Potassium 12/07/2022 3 5    • Chloride 12/07/2022 104    • CO2 12/07/2022 29    • ANION GAP 12/07/2022 7    • BUN 12/07/2022 15    • Creatinine 12/07/2022 1 08    • Glucose 12/07/2022 124    • Calcium 12/07/2022 8 4    • AST 12/07/2022 22    • ALT 12/07/2022 39    • Alkaline Phosphatase 12/07/2022 52    • Total Protein 12/07/2022 7 1    • Albumin 12/07/2022 3 7    • Total Bilirubin 12/07/2022 0 50    • eGFR 12/07/2022 94    • Lipase 12/07/2022 141          Radiology Results:   CT abdomen pelvis with contrast    Result Date: 12/8/2022  Narrative: CT ABDOMEN AND PELVIS WITH IV CONTRAST INDICATION:   epigastric and generalized abdominal pain  Juan Antonio Orosco History of prostatic ulcer disease COMPARISON:  CT 1/16/18, ultrasound 4/15/22 TECHNIQUE:  CT examination of the abdomen and pelvis was performed  Axial, sagittal, and coronal 2D reformatted images were created from the source data and submitted for interpretation  Radiation dose length product (DLP) for this visit:  805 67 mGy-cm   This examination, like all CT scans performed in the [facility], was performed utilizing techniques to minimize radiation dose exposure, including the use of iterative  reconstruction and automated exposure control  IV Contrast:  100 mL of iohexol (OMNIPAQUE) Enteric Contrast:  Enteric contrast was not administered  FINDINGS: ABDOMEN LOWER CHEST:  No clinically significant abnormality identified in the visualized lower chest  LIVER/BILIARY TREE:  Unremarkable  GALLBLADDER:  No calcified gallstones  No pericholecystic inflammatory change  SPLEEN:  Unremarkable  PANCREAS:  Unremarkable  ADRENAL GLANDS:  Unremarkable  KIDNEYS/URETERS:  Unremarkable  No hydronephrosis  STOMACH AND BOWEL:  Unremarkable  APPENDIX:  No findings to suggest appendicitis  ABDOMINOPELVIC CAVITY:  No ascites  No pneumoperitoneum  No lymphadenopathy  VESSELS:  Unremarkable for patient's age  PELVIS REPRODUCTIVE ORGANS:  Unremarkable for patient's age  URINARY BLADDER:  Unremarkable  ABDOMINAL WALL/INGUINAL REGIONS:  Unremarkable  OSSEOUS STRUCTURES:  No acute fracture or destructive osseous lesion       Impression: No acute abnormality Workstation performed: [id number]

## 2022-12-13 NOTE — PATIENT INSTRUCTIONS
GERD (Gastroesophageal Reflux Disease)   WHAT YOU NEED TO KNOW:   Gastroesophageal reflux disease (GERD) is reflux that happens more than 2 times a week for a few weeks  Reflux means acid and food in your stomach back up into your esophagus  GERD can cause other health problems over time if it is not treated  DISCHARGE INSTRUCTIONS:   Call your local emergency number (911 in the [address] [facility]) if:   You have severe chest pain and sudden trouble breathing  Return to the emergency department if:   You have trouble breathing after you vomit  You have trouble swallowing, or pain with swallowing  Your bowel movements are black, bloody, or tarry-looking  Your vomit looks like coffee grounds or has blood in it  Call your doctor or gastroenterologist if:   You feel full and cannot burp or vomit  You vomit large amounts, or you vomit often  You are losing weight without trying  Your symptoms get worse or do not improve with treatment  You have questions or concerns about your condition or care  Medicines:   Medicines  are used to decrease stomach acid  Medicine may also be used to help your lower esophageal sphincter and stomach contract (tighten) more  Take your medicine as directed  Contact your healthcare provider if you think your medicine is not helping or if you have side effects  Tell him of her if you are allergic to any medicine  Keep a list of the medicines, vitamins, and herbs you take  Include the amounts, and when and why you take them  Bring the list or the pill bottles to follow-up visits  Carry your medicine list with you in case of an emergency  Manage GERD:       Do not have foods or drinks that may increase heartburn  These include chocolate, peppermint, fried or fatty foods, drinks that contain caffeine, or carbonated drinks (soda)  Other foods include spicy foods, onions, tomatoes, and tomato-based foods   Do not have foods or drinks that can irritate your esophagus, such as citrus fruits, juices, and alcohol  Do not eat large meals  When you eat a lot of food at one time, your stomach needs more acid to digest it  Eat 6 small meals each day instead of 3 large ones, and eat slowly  Do not eat meals 2 to 3 hours before bedtime  Elevate the head of your bed  Place 6-inch blocks under the head of your bed frame  You may also use more than one pillow under your head and shoulders while you sleep  Maintain a healthy weight  If you are overweight, weight loss may help relieve symptoms of GERD  Do not smoke  Smoking weakens the lower esophageal sphincter and increases the risk of GERD  Ask your healthcare provider for information if you currently smoke and need help to quit  E-cigarettes or smokeless tobacco still contain nicotine  Talk to your healthcare provider before you use these products  Do not put pressure on your abdomen  Pressure pushes acid up into your esophagus  Do not wear clothing that is tight around your waist  Do not bend over  Bend at the knees if you need to pick something up  Follow up with your doctor or gastroenterologist as directed:  Write down your questions so you remember to ask them during your visits  © Copyright [employer] 2022 Information is for End User's use only and may not be sold, redistributed or otherwise used for commercial purposes  All illustrations and images included in CareNotes® are the copyrighted property of A D A M , Inc  or [facility] [place][note 2] Smiley Davidson   The above information is an  only  It is not intended as medical advice for individual conditions or treatments  Talk to your doctor, nurse or pharmacist before following any medical regimen to see if it is safe and effective for you    Scheduled date of EGD(as of today):12/27/2022  Physician performing EGD:Dr Ballard  Location of EGD:[place]  Instructions reviewed with patient by:Ana  Clearances:  N/A

## 2022-12-14 ENCOUNTER — NURSE TRIAGE (OUTPATIENT)
Dept: OTHER | Facility: OTHER | Age: 27
End: 2022-12-14

## 2022-12-14 DIAGNOSIS — R10.13 EPIGASTRIC ABDOMINAL PAIN: Primary | ICD-10-CM

## 2022-12-14 RX ORDER — SUCRALFATE ORAL 1 G/10ML
1 SUSPENSION ORAL
Qty: 414 ML | Refills: 1 | Status: SHIPPED | OUTPATIENT
Start: 2022-12-14

## 2022-12-14 NOTE — TELEPHONE ENCOUNTER
Reason for Disposition  • [1] SEVERE pain (e g , excruciating) AND [2] present > 1 hour    Answer Assessment - Initial Assessment Questions  1  LOCATION: "Where does it hurt?"       Entire abdomen  2  RADIATION: "Does the pain shoot anywhere else?" (e g , chest, back)      He says its everywhere  3  ONSET: "When did the pain begin?" (Minutes, hours or days ago)       Abdominal pain since 2018 on/off  Two weeks now has been the worse  4       5  PATTERN "Does the pain come and go, or is it constant?"    Intermittent pain    6  SEVERITY: "How bad is the pain?"  (e g , Scale 1-10; mild, moderate, or severe)     - MILD (1-3): doesn't interfere with normal activities, abdomen soft and not tender to touch      - MODERATE (4-7): interferes with normal activities or awakens from sleep, tender to touch      - SEVERE (8-10): excruciating pain, doubled over, unable to do any normal activities       Pain is  10-20 /10 when they come  7  RECURRENT SYMPTOM: "Have you ever had this type of stomach pain before?" If Yes, ask: "When was the last time?" and "What happened that time?"       Eating makes it worse and makes him vomit  8  CAUSE: "What do you think is causing the stomach pain?"      Ulcer and gastritis  9  RELIEVING/AGGRAVATING FACTORS: "What makes it better or worse?" (e g , movement, antacids, bowel movement)      Eating  10  OTHER SYMPTOMS: "Has there been any vomiting, diarrhea, constipation, or urine problems?" He vomited today twice and he felt relief            Medications started pantoprazole 40 mg      Protocols used: ABDOMINAL PAIN - MALE-ADULT-

## 2022-12-20 ENCOUNTER — NURSE TRIAGE (OUTPATIENT)
Dept: OTHER | Facility: OTHER | Age: 27
End: 2022-12-20

## 2022-12-20 NOTE — TELEPHONE ENCOUNTER
As per on call provider please have patient take protonix 40mg 30 minutes prior to meals and if he vomits blood again go to ED  On call provider unable to reach patient, left VM

## 2022-12-20 NOTE — TELEPHONE ENCOUNTER
Reason for Disposition  • Vomiting red blood or black (coffee ground) material    Answer Assessment - Initial Assessment Questions  1  VOMITING SEVERITY: "How many times have you vomited in the past 24 hours?"      - MILD:  1 - 2 times/day     - MODERATE: 3 - 5 times/day, decreased oral intake without significant weight loss or symptoms of dehydration     - SEVERE: 6 or more times/day, vomits everything or nearly everything, with significant weight loss, symptoms of dehydration       1  2  ONSET: "When did the vomiting begin?"       Today just now  3  FLUIDS: "What fluids or food have you vomited up today?" "Have you been able to keep any fluids down?"      All fluids until now  4  ABDOMINAL PAIN: "Are your having any abdominal pain?" If yes : "How bad is it and what does it feel like?" (e g , crampy, dull, intermittent, constant)       Cramps and sharp pains  5  DIARRHEA: "Is there any diarrhea?" If Yes, ask: "How many times today?"       Yes 3 times   6  CONTACTS: "Is there anyone else in the family with the same symptoms?"       No   7  CAUSE: "What do you think is causing your vomiting?"      History of peptic ulcer   8  HYDRATION STATUS: "Any signs of dehydration?" (e g , dry mouth [not only dry lips], too weak to stand) "When did you last urinate?"      No signs of dehydration  9  OTHER SYMPTOMS: "Do you have any other symptoms?" (e g , fever, headache, vertigo, vomiting blood or coffee grounds, recent head injury)      Back pain and nausea   10   PREGNANCY: "Is there any chance you are pregnant?" "When was your last menstrual period?"        No    Protocols used: VOMITING-ADULT-[place]

## 2022-12-20 NOTE — TELEPHONE ENCOUNTER
Regarding: Vomiting blood  ----- Message from Ruy Brandon sent at 12/20/2022  4:02 PM EST -----  "I threw up blood and calling to make my gastro aware "

## 2022-12-20 NOTE — TELEPHONE ENCOUNTER
Patient is vomiting blood he has vomited blood once he states he also has diarrhea this has been an ongoing issue for him  On call provider messaged and states she will call patient now

## 2022-12-21 NOTE — TELEPHONE ENCOUNTER
Spoke with patient  Patient stated he is no longer vomiting blood  Advised him to take Pantoprazole 40 mg twice daily 30 minutes before breakfast and dinner  Advised patient to stay on a clear liquid diet until his symptoms subside  Informed patient this consists of water, jello, Gatorade, Pedialyte etc  Advised patient ER evaluation if he vomits blood again  Patient agreeable

## 2022-12-27 ENCOUNTER — HOSPITAL ENCOUNTER (OUTPATIENT)
Dept: GASTROENTEROLOGY | Facility: HOSPITAL | Age: 27
Setting detail: OUTPATIENT SURGERY
Discharge: HOME | End: 2022-12-27
Attending: INTERNAL MEDICINE | Admitting: INTERNAL MEDICINE

## 2022-12-27 ENCOUNTER — ANESTHESIA (OUTPATIENT)
Dept: GASTROENTEROLOGY | Facility: HOSPITAL | Age: 27
End: 2022-12-27

## 2022-12-27 ENCOUNTER — ANESTHESIA EVENT (OUTPATIENT)
Dept: GASTROENTEROLOGY | Facility: HOSPITAL | Age: 27
End: 2022-12-27

## 2022-12-27 VITALS
BODY MASS INDEX: 32.47 KG/M2 | RESPIRATION RATE: 18 BRPM | SYSTOLIC BLOOD PRESSURE: 112 MMHG | DIASTOLIC BLOOD PRESSURE: 80 MMHG | OXYGEN SATURATION: 100 % | HEART RATE: 63 BPM | HEIGHT: 73 IN | TEMPERATURE: 96.8 F | WEIGHT: 245 LBS

## 2022-12-27 DIAGNOSIS — K27.9 PUD (PEPTIC ULCER DISEASE): ICD-10-CM

## 2022-12-27 DIAGNOSIS — R10.13 EPIGASTRIC PAIN: ICD-10-CM

## 2022-12-27 PROBLEM — R53.83 OTHER FATIGUE: Status: RESOLVED | Noted: 2022-04-12 | Resolved: 2022-12-27

## 2022-12-27 PROBLEM — R10.9 ABDOMINAL CRAMPING: Status: RESOLVED | Noted: 2019-07-09 | Resolved: 2022-12-27

## 2022-12-27 PROBLEM — Z11.59 ENCOUNTER FOR HEPATITIS C SCREENING TEST FOR LOW RISK PATIENT: Status: RESOLVED | Noted: 2022-04-12 | Resolved: 2022-12-27

## 2022-12-27 PROBLEM — R19.7 DIARRHEA: Status: RESOLVED | Noted: 2019-07-09 | Resolved: 2022-12-27

## 2022-12-27 PROBLEM — R19.4 ENCOUNTER FOR DIAGNOSTIC COLONOSCOPY DUE TO CHANGE IN BOWEL HABITS: Status: RESOLVED | Noted: 2022-04-12 | Resolved: 2022-12-27

## 2022-12-27 PROBLEM — I50.9 CONGESTIVE HEART FAILURE, UNSPECIFIED HF CHRONICITY, UNSPECIFIED HEART FAILURE TYPE (HCC): Status: RESOLVED | Noted: 2022-12-08 | Resolved: 2022-12-27

## 2022-12-27 PROBLEM — D72.829 LEUKOCYTOSIS: Status: RESOLVED | Noted: 2020-01-09 | Resolved: 2022-12-27

## 2022-12-27 PROBLEM — Z11.4 ENCOUNTER FOR SCREENING FOR HIV: Status: RESOLVED | Noted: 2022-04-12 | Resolved: 2022-12-27

## 2022-12-27 PROBLEM — M54.9 BACK PAIN: Status: RESOLVED | Noted: 2020-01-09 | Resolved: 2022-12-27

## 2022-12-27 PROBLEM — Z00.01 ENCOUNTER FOR GENERAL ADULT MEDICAL EXAMINATION WITH ABNORMAL FINDINGS: Status: RESOLVED | Noted: 2019-05-12 | Resolved: 2022-12-27

## 2022-12-27 PROBLEM — R30.0 DYSURIA: Status: RESOLVED | Noted: 2019-10-14 | Resolved: 2022-12-27

## 2022-12-27 RX ORDER — SODIUM CHLORIDE, SODIUM LACTATE, POTASSIUM CHLORIDE, CALCIUM CHLORIDE 600; 310; 30; 20 MG/100ML; MG/100ML; MG/100ML; MG/100ML
50 INJECTION, SOLUTION INTRAVENOUS CONTINUOUS
Status: CANCELLED | OUTPATIENT
Start: 2022-12-27

## 2022-12-27 RX ORDER — LIDOCAINE HYDROCHLORIDE 10 MG/ML
INJECTION, SOLUTION EPIDURAL; INFILTRATION; INTRACAUDAL; PERINEURAL AS NEEDED
Status: DISCONTINUED | OUTPATIENT
Start: 2022-12-27 | End: 2022-12-27

## 2022-12-27 RX ORDER — SODIUM CHLORIDE, SODIUM LACTATE, POTASSIUM CHLORIDE, CALCIUM CHLORIDE 600; 310; 30; 20 MG/100ML; MG/100ML; MG/100ML; MG/100ML
50 INJECTION, SOLUTION INTRAVENOUS CONTINUOUS
Status: DISCONTINUED | OUTPATIENT
Start: 2022-12-27 | End: 2022-12-31 | Stop reason: HOSPADM

## 2022-12-27 RX ORDER — PROPOFOL 10 MG/ML
INJECTION, EMULSION INTRAVENOUS AS NEEDED
Status: DISCONTINUED | OUTPATIENT
Start: 2022-12-27 | End: 2022-12-27

## 2022-12-27 RX ORDER — ESCITALOPRAM OXALATE 10 MG/1
10 TABLET ORAL DAILY
COMMUNITY
Start: 2022-12-15

## 2022-12-27 RX ORDER — OMEPRAZOLE 20 MG/1
20 CAPSULE, DELAYED RELEASE ORAL
COMMUNITY
Start: 2022-12-15 | End: 2023-01-08

## 2022-12-27 RX ADMIN — PROPOFOL 50 MG: 10 INJECTION, EMULSION INTRAVENOUS at 13:05

## 2022-12-27 RX ADMIN — LIDOCAINE HYDROCHLORIDE 5 ML: 10 INJECTION, SOLUTION EPIDURAL; INFILTRATION; INTRACAUDAL; PERINEURAL at 13:02

## 2022-12-27 RX ADMIN — SODIUM CHLORIDE, SODIUM LACTATE, POTASSIUM CHLORIDE, AND CALCIUM CHLORIDE 50 ML/HR: .6; .31; .03; .02 INJECTION, SOLUTION INTRAVENOUS at 11:55

## 2022-12-27 RX ADMIN — PROPOFOL 200 MG: 10 INJECTION, EMULSION INTRAVENOUS at 13:02

## 2022-12-27 NOTE — INTERVAL H&P NOTE
H&P reviewed  After examining the patient I find no changes in the patients condition since the H&P had been written  Vitals:    12/27/22 1144   BP: 121/83   Pulse: 82   Resp: 18   Temp: 97 8 °F (36 6 °C)   SpO2: 99%     ASSESSMENT/PLAN:  This is a 23y o  year old male here for EGD, and he is stable and optimized for his procedure

## 2022-12-27 NOTE — ANESTHESIA POSTPROCEDURE EVALUATION
Post-Op Assessment Note    CV Status:  Stable    Pain management: adequate     Mental Status:  Awake   Hydration Status:  Euvolemic   PONV Controlled:  Controlled   Airway Patency:  Patent      Post Op Vitals Reviewed: Yes      Staff: CRNA         No notable events documented      BP   111/64   Temp      Pulse  77   Resp   20   SpO2   100

## 2022-12-27 NOTE — ANESTHESIA PREPROCEDURE EVALUATION
Procedure:  EGD    Relevant Problems   CARDIO   (+) Mixed hyperlipidemia      MUSCULOSKELETAL   (+) Back pain      Digestive   (+) Irritable bowel syndrome      Musculoskeletal and Integument   (+) Arthralgia of right temporomandibular joint      Other   (+) Abdominal cramping   (+) Blood in stool   (+) Epigastric abdominal pain        Physical Exam    Airway    Mallampati score: III  TM Distance: >3 FB  Neck ROM: full     Dental   No notable dental hx     Cardiovascular      Pulmonary      Other Findings        Anesthesia Plan  ASA Score- 2     Anesthesia Type- IV sedation with anesthesia with ASA Monitors  Additional Monitors:   Airway Plan:           Plan Factors-Exercise tolerance (METS): >4 METS  Chart reviewed  Patient summary reviewed  Patient is not a current smoker  Patient did not smoke on day of surgery  Induction- intravenous  Postoperative Plan-     Informed Consent- Anesthetic plan and risks discussed with patient  I personally reviewed this patient with the CRNA  Discussed and agreed on the Anesthesia Plan with the CRNA  Amanda Griffin

## 2023-01-02 NOTE — RESULT ENCOUNTER NOTE
Please call patient, he reports of endoscopy is of inflammation of the stomach  Biopsy has not ready yet    Follow-up with gastroenterologist

## 2023-01-06 ENCOUNTER — TELEPHONE (OUTPATIENT)
Dept: OTHER | Facility: OTHER | Age: 28
End: 2023-01-06

## 2023-01-08 ENCOUNTER — HOSPITAL ENCOUNTER (EMERGENCY)
Facility: HOSPITAL | Age: 28
Discharge: HOME | End: 2023-01-08
Attending: EMERGENCY MEDICINE

## 2023-01-08 VITALS
OXYGEN SATURATION: 99 % | BODY MASS INDEX: 31.67 KG/M2 | DIASTOLIC BLOOD PRESSURE: 98 MMHG | SYSTOLIC BLOOD PRESSURE: 139 MMHG | HEART RATE: 89 BPM | RESPIRATION RATE: 18 BRPM | TEMPERATURE: 98.1 F | WEIGHT: 240.08 LBS

## 2023-01-08 DIAGNOSIS — K29.70 GASTRITIS: ICD-10-CM

## 2023-01-08 DIAGNOSIS — R10.9 ABDOMINAL PAIN: Primary | ICD-10-CM

## 2023-01-08 DIAGNOSIS — R19.7 DIARRHEA: ICD-10-CM

## 2023-01-08 DIAGNOSIS — R11.2 NAUSEA AND VOMITING: ICD-10-CM

## 2023-01-08 DIAGNOSIS — Z87.11 HISTORY OF PEPTIC ULCER DISEASE: ICD-10-CM

## 2023-01-08 DIAGNOSIS — N39.0 UTI (URINARY TRACT INFECTION): ICD-10-CM

## 2023-01-08 LAB
ALBUMIN SERPL BCP-MCNC: 3.4 G/DL (ref 3.5–5)
ALP SERPL-CCNC: 58 U/L (ref 46–116)
ALT SERPL W P-5'-P-CCNC: 34 U/L (ref 12–78)
ANION GAP SERPL CALCULATED.3IONS-SCNC: 7 MMOL/L (ref 4–13)
AST SERPL W P-5'-P-CCNC: 28 U/L (ref 5–45)
BACTERIA UR QL AUTO: ABNORMAL /HPF
BASOPHILS # BLD AUTO: 0.02 THOUSANDS/ÂΜL (ref 0–0.1)
BASOPHILS NFR BLD AUTO: 0 % (ref 0–1)
BILIRUB SERPL-MCNC: 0.86 MG/DL (ref 0.2–1)
BILIRUB UR QL STRIP: ABNORMAL
BUN SERPL-MCNC: 6 MG/DL (ref 5–25)
CALCIUM ALBUM COR SERPL-MCNC: 9.4 MG/DL (ref 8.3–10.1)
CALCIUM SERPL-MCNC: 8.9 MG/DL (ref 8.3–10.1)
CHLORIDE SERPL-SCNC: 104 MMOL/L (ref 96–108)
CLARITY UR: CLEAR
CO2 SERPL-SCNC: 30 MMOL/L (ref 21–32)
COLOR UR: ABNORMAL
CREAT SERPL-MCNC: 1 MG/DL (ref 0.6–1.3)
EOSINOPHIL # BLD AUTO: 0.43 THOUSAND/ÂΜL (ref 0–0.61)
EOSINOPHIL NFR BLD AUTO: 4 % (ref 0–6)
ERYTHROCYTE [DISTWIDTH] IN BLOOD BY AUTOMATED COUNT: 13.8 % (ref 11.6–15.1)
GFR SERPL CREATININE-BSD FRML MDRD: 103 ML/MIN/1.73SQ M
GLUCOSE SERPL-MCNC: 95 MG/DL (ref 65–140)
GLUCOSE UR STRIP-MCNC: NEGATIVE MG/DL
HCT VFR BLD AUTO: 51.8 % (ref 36.5–49.3)
HGB BLD-MCNC: 17 G/DL (ref 12–17)
HGB UR QL STRIP.AUTO: ABNORMAL
IMM GRANULOCYTES # BLD AUTO: 0.02 THOUSAND/UL (ref 0–0.2)
IMM GRANULOCYTES NFR BLD AUTO: 0 % (ref 0–2)
KETONES UR STRIP-MCNC: ABNORMAL MG/DL
LEUKOCYTE ESTERASE UR QL STRIP: NEGATIVE
LIPASE SERPL-CCNC: 73 U/L (ref 73–393)
LYMPHOCYTES # BLD AUTO: 1.57 THOUSANDS/ÂΜL (ref 0.6–4.47)
LYMPHOCYTES NFR BLD AUTO: 16 % (ref 14–44)
MCH RBC QN AUTO: 30.7 PG (ref 26.8–34.3)
MCHC RBC AUTO-ENTMCNC: 32.8 G/DL (ref 31.4–37.4)
MCV RBC AUTO: 94 FL (ref 82–98)
MONOCYTES # BLD AUTO: 0.37 THOUSAND/ÂΜL (ref 0.17–1.22)
MONOCYTES NFR BLD AUTO: 4 % (ref 4–12)
MUCOUS THREADS UR QL AUTO: ABNORMAL
NEUTROPHILS # BLD AUTO: 7.32 THOUSANDS/ÂΜL (ref 1.85–7.62)
NEUTS SEG NFR BLD AUTO: 76 % (ref 43–75)
NITRITE UR QL STRIP: POSITIVE
NON-SQ EPI CELLS URNS QL MICRO: ABNORMAL /HPF
NRBC BLD AUTO-RTO: 0 /100 WBCS
PH UR STRIP.AUTO: 6 [PH] (ref 4.5–8)
PLATELET # BLD AUTO: 214 THOUSANDS/UL (ref 149–390)
PMV BLD AUTO: 10.2 FL (ref 8.9–12.7)
POTASSIUM SERPL-SCNC: 3.7 MMOL/L (ref 3.5–5.3)
PROT SERPL-MCNC: 6.8 G/DL (ref 6.4–8.4)
PROT UR STRIP-MCNC: ABNORMAL MG/DL
RBC # BLD AUTO: 5.54 MILLION/UL (ref 3.88–5.62)
RBC #/AREA URNS AUTO: ABNORMAL /HPF
SODIUM SERPL-SCNC: 141 MMOL/L (ref 135–147)
SP GR UR STRIP.AUTO: >=1.03 (ref 1–1.03)
UROBILINOGEN UR QL STRIP.AUTO: 1 E.U./DL
WBC # BLD AUTO: 9.73 THOUSAND/UL (ref 4.31–10.16)
WBC #/AREA URNS AUTO: ABNORMAL /HPF

## 2023-01-08 RX ORDER — ONDANSETRON 2 MG/ML
4 INJECTION INTRAMUSCULAR; INTRAVENOUS ONCE
Status: COMPLETED | OUTPATIENT
Start: 2023-01-08 | End: 2023-01-08

## 2023-01-08 RX ORDER — DICYCLOMINE HCL 20 MG
20 TABLET ORAL EVERY 6 HOURS PRN
Qty: 20 TABLET | Refills: 0 | Status: SHIPPED | OUTPATIENT
Start: 2023-01-08 | End: 2023-01-26 | Stop reason: SDUPTHER

## 2023-01-08 RX ORDER — FAMOTIDINE 10 MG/ML
20 INJECTION, SOLUTION INTRAVENOUS ONCE
Status: COMPLETED | OUTPATIENT
Start: 2023-01-08 | End: 2023-01-08

## 2023-01-08 RX ORDER — LIDOCAINE HYDROCHLORIDE 20 MG/ML
10 SOLUTION OROPHARYNGEAL ONCE
Status: COMPLETED | OUTPATIENT
Start: 2023-01-08 | End: 2023-01-08

## 2023-01-08 RX ORDER — KETOROLAC TROMETHAMINE 30 MG/ML
30 INJECTION, SOLUTION INTRAMUSCULAR; INTRAVENOUS ONCE
Status: COMPLETED | OUTPATIENT
Start: 2023-01-08 | End: 2023-01-08

## 2023-01-08 RX ORDER — ONDANSETRON 4 MG/1
4 TABLET, ORALLY DISINTEGRATING ORAL ONCE
Status: COMPLETED | OUTPATIENT
Start: 2023-01-08 | End: 2023-01-08

## 2023-01-08 RX ORDER — CEPHALEXIN 500 MG/1
500 CAPSULE ORAL EVERY 12 HOURS SCHEDULED
Qty: 14 CAPSULE | Refills: 0 | Status: SHIPPED | OUTPATIENT
Start: 2023-01-09 | End: 2023-01-16

## 2023-01-08 RX ORDER — MAGNESIUM HYDROXIDE/ALUMINUM HYDROXICE/SIMETHICONE 120; 1200; 1200 MG/30ML; MG/30ML; MG/30ML
20 SUSPENSION ORAL ONCE
Status: COMPLETED | OUTPATIENT
Start: 2023-01-08 | End: 2023-01-08

## 2023-01-08 RX ORDER — ONDANSETRON 4 MG/1
4 TABLET, ORALLY DISINTEGRATING ORAL EVERY 8 HOURS PRN
Qty: 12 TABLET | Refills: 0 | Status: SHIPPED | OUTPATIENT
Start: 2023-01-08 | End: 2023-01-26 | Stop reason: SDUPTHER

## 2023-01-08 RX ORDER — DICYCLOMINE HCL 20 MG
20 TABLET ORAL ONCE
Status: COMPLETED | OUTPATIENT
Start: 2023-01-08 | End: 2023-01-08

## 2023-01-08 RX ORDER — SUCRALFATE 1 G/1
1 TABLET ORAL 4 TIMES DAILY
Qty: 56 TABLET | Refills: 0 | Status: SHIPPED | OUTPATIENT
Start: 2023-01-08 | End: 2023-01-22

## 2023-01-08 RX ADMIN — CEFTRIAXONE SODIUM 1000 MG: 10 INJECTION, POWDER, FOR SOLUTION INTRAVENOUS at 18:21

## 2023-01-08 RX ADMIN — LIDOCAINE HYDROCHLORIDE 10 ML: 20 SOLUTION ORAL at 16:21

## 2023-01-08 RX ADMIN — SODIUM CHLORIDE 1000 ML: 0.9 INJECTION, SOLUTION INTRAVENOUS at 17:41

## 2023-01-08 RX ADMIN — FAMOTIDINE 20 MG: 10 INJECTION, SOLUTION INTRAVENOUS at 18:18

## 2023-01-08 RX ADMIN — ALUMINUM HYDROXIDE, MAGNESIUM HYDROXIDE, AND SIMETHICONE 20 ML: 200; 200; 20 SUSPENSION ORAL at 16:21

## 2023-01-08 RX ADMIN — KETOROLAC TROMETHAMINE 30 MG: 30 INJECTION, SOLUTION INTRAMUSCULAR; INTRAVENOUS at 18:19

## 2023-01-08 RX ADMIN — DICYCLOMINE HYDROCHLORIDE 20 MG: 20 TABLET ORAL at 16:21

## 2023-01-08 RX ADMIN — ONDANSETRON 4 MG: 4 TABLET, ORALLY DISINTEGRATING ORAL at 15:54

## 2023-01-08 RX ADMIN — ONDANSETRON 4 MG: 2 INJECTION INTRAMUSCULAR; INTRAVENOUS at 17:41

## 2023-01-08 NOTE — Clinical Note
Fernanda Meng was seen and treated in our emergency department on 1/8/2023  Diagnosis:     Fernanda  may return to work on return date  He may return on this date: 01/10/2023         If you have any questions or concerns, please don't hesitate to call        Lex Butterfield DO    ______________________________           _______________          _______________  Hospital Representative                              Date                                Time

## 2023-01-08 NOTE — DISCHARGE INSTRUCTIONS
Líquidos tanvi solo [address] próximas 6 a 8 horas  Si no hay vómitos recurrentes o náuseas intensas, puede avanzar a kasie dieta blanda y avanzar lentamente según lo tolere  Regrese al [employer] de [employer] por cualquier vómito persistente, [employer] en siegel vómito o heces, empeoramiento del dolor, fim[address]are de [place] [address] o cualquier inquietud

## 2023-01-08 NOTE — Clinical Note
Fernanda Benítez was seen and treated in our emergency department on 1/8/2023  Diagnosis:     Fernanda  may return to work on return date  He may return on this date: 01/10/2023         If you have any questions or concerns, please don't hesitate to call        Chad Major DO    ______________________________           _______________          _______________  Hospital Representative                              Date                                Time

## 2023-01-08 NOTE — ED PROVIDER NOTES
History  Chief Complaint   Patient presents with   • Abdominal Pain     With abd pain and vomiting for a couple weeks seen at [facility] for it yesterday not feeling any better     32y  M here with multiple complaints  Hx of known PUD, just had EGD on 12/27 w/ [facility], awaiting results  Seen by [facility] ED yesterday for abd pain, n/v/d - had labs, ct a/p  Tolerated po in ED and d/c home  Pt reports recurrence of nbnb emesis w/ last episode just pta  Still c/o diffuse abd pain and continued non-bloody diarrhea  Additionally now c/o dysuria and frequency  Denies f/c/s  Taking his protonix, not taking anything else for symptoms  History provided by:  Patient and medical records   used: No    Abdominal Pain  Pain location:  Generalized  Pain quality: aching, burning and cramping    Pain radiates to:  Does not radiate  Pain severity:  Severe  Onset quality:  Gradual  Timing:  Constant  Progression:  Unchanged  Chronicity:  New  Context: not sick contacts and not suspicious food intake    Relieved by:  Nothing  Worsened by:  Nothing  Ineffective treatments:  None tried  Associated symptoms: anorexia, diarrhea, dysuria, fatigue, nausea and vomiting    Associated symptoms: no chills, no constipation, no fever, no hematemesis, no hematochezia, no hematuria and no melena        Prior to Admission Medications   Prescriptions Last Dose Informant Patient Reported?  Taking?   aluminum-magnesium hydroxide-simethicone (MAALOX MAX) 400-400-40 MG/5ML suspension   No No   Sig: Take 10 mL by mouth every 6 (six) hours as needed for indigestion or heartburn   escitalopram (LEXAPRO) 10 mg tablet   Yes No   Sig: Take 10 mg by mouth daily   famotidine (PEPCID) 20 mg tablet   No No   Sig: Take 1 tablet (20 mg total) by mouth 2 (two) times a day   omeprazole (PriLOSEC) 20 mg delayed release capsule   Yes No   Sig: Take 20 mg by mouth daily before breakfast   pantoprazole (PROTONIX) 40 mg tablet   No No   Sig: Take 1 tablet (40 mg total) by mouth 2 (two) times a day Half an hour prior to breakfast and dinner   sucralfate (CARAFATE) 1 g/10 mL suspension   No No   Sig: Take 10 mL (1 g total) by mouth 4 (four) times a day (with meals and at bedtime)      Facility-Administered Medications: None       Past Medical History:   Diagnosis Date   • COVID    • Duodenitis    • Gastric ulcer    • Gastritis        Past Surgical History:   Procedure Laterality Date   • COLONOSCOPY  2022    normal   • ESOPHAGOGASTRODUODENOSCOPY  08/2018    duodenitis, repeat done 2022       Family History   Problem Relation Age of Onset   • Hypertension Mother      I have reviewed and agree with the history as documented  E-Cigarette/Vaping   • E-Cigarette Use Never User      E-Cigarette/Vaping Substances   • Nicotine No    • THC No    • CBD No    • Flavoring No    • Other No    • Unknown No      Social History     Tobacco Use   • Smoking status: Some Days     Types: Pipe   • Smokeless tobacco: Never   • Tobacco comments:     Hookah   Vaping Use   • Vaping Use: Never used   Substance Use Topics   • Alcohol use: Yes     Comment: occasionally   • Drug use: No       Review of Systems   Constitutional: Positive for fatigue  Negative for chills and fever  Gastrointestinal: Positive for abdominal pain, anorexia, diarrhea, nausea and vomiting  Negative for constipation, hematemesis, hematochezia and melena  Genitourinary: Positive for dysuria  Negative for hematuria  All other systems reviewed and are negative  Physical Exam  Physical Exam  Vitals and nursing note reviewed  Constitutional:       General: He is not in acute distress  Appearance: Normal appearance  He is not ill-appearing, toxic-appearing or diaphoretic  HENT:      Nose: Nose normal       Mouth/Throat:      Mouth: Mucous membranes are moist    Eyes:      Conjunctiva/sclera: Conjunctivae normal    Cardiovascular:      Rate and Rhythm: Normal rate and regular rhythm  Heart sounds:  No murmur heard  Pulmonary:      Effort: Pulmonary effort is normal       Breath sounds: Normal breath sounds  Abdominal:      Palpations: Abdomen is soft  Tenderness: There is generalized abdominal tenderness  There is no right CVA tenderness, left CVA tenderness, guarding or rebound  Musculoskeletal:         General: No tenderness  Cervical back: Normal range of motion  Skin:     General: Skin is warm  Neurological:      General: No focal deficit present  Mental Status: He is alert and oriented to person, place, and time     Psychiatric:         Mood and Affect: Mood normal          Vital Signs  ED Triage Vitals [01/08/23 1513]   Temperature Pulse Respirations Blood Pressure SpO2   98 1 °F (36 7 °C) 89 18 139/98 99 %      Temp Source Heart Rate Source Patient Position - Orthostatic VS BP Location FiO2 (%)   Oral Monitor Sitting Right arm --      Pain Score       10 - Worst Possible Pain           Vitals:    01/08/23 1513   BP: 139/98   Pulse: 89   Patient Position - Orthostatic VS: Sitting         Visual Acuity      ED Medications  Medications   ondansetron (ZOFRAN-ODT) dispersible tablet 4 mg (4 mg Oral Given 1/8/23 1554)   dicyclomine (BENTYL) tablet 20 mg (20 mg Oral Given 1/8/23 1621)   aluminum-magnesium hydroxide-simethicone (MYLANTA) oral suspension 20 mL (20 mL Oral Given 1/8/23 1621)   Lidocaine Viscous HCl (XYLOCAINE) 2 % mucosal solution 10 mL (10 mL Oral Given 1/8/23 1621)   sodium chloride 0 9 % bolus 1,000 mL (0 mL Intravenous Stopped 1/8/23 1841)   ondansetron (ZOFRAN) injection 4 mg (4 mg Intravenous Given 1/8/23 1741)   Famotidine (PF) (PEPCID) injection 20 mg (20 mg Intravenous Given 1/8/23 1818)   ketorolac (TORADOL) injection 30 mg (30 mg Intravenous Given 1/8/23 1819)   ceftriaxone (ROCEPHIN) 1 g/50 mL in dextrose IVPB (0 mg Intravenous Stopped 1/8/23 1845)       Diagnostic Studies  Results Reviewed     Procedure Component Value Units Date/Time    Urine culture [[id number]] Collected: 01/08/23 1859    Lab Status: No result Specimen: Urine, Clean Catch     Comprehensive metabolic panel [[id number]]  (Abnormal) Collected: 01/08/23 1741    Lab Status: Final result Specimen: Blood from Arm, Right Updated: 01/08/23 1812     Sodium 141 mmol/L      Potassium 3 7 mmol/L      Chloride 104 mmol/L      CO2 30 mmol/L      ANION GAP 7 mmol/L      BUN 6 mg/dL      Creatinine 1 00 mg/dL      Glucose 95 mg/dL      Calcium 8 9 mg/dL      Corrected Calcium 9 4 mg/dL      AST 28 U/L      ALT 34 U/L      Alkaline Phosphatase 58 U/L      Total Protein 6 8 g/dL      Albumin 3 4 g/dL      Total Bilirubin 0 86 mg/dL      eGFR 103 ml/min/1 73sq m     Narrative:      [place] guidelines for Chronic Kidney Disease (CKD):   •  Stage 1 with normal or high GFR (GFR > 90 mL/min/1 73 square meters)  •  Stage 2 Mild CKD (GFR = 60-89 mL/min/1 73 square meters)  •  Stage 3A Moderate CKD (GFR = 45-59 mL/min/1 73 square meters)  •  Stage 3B Moderate CKD (GFR = 30-44 mL/min/1 73 square meters)  •  Stage 4 Severe CKD (GFR = 15-29 mL/min/1 73 square meters)  •  Stage 5 End Stage CKD (GFR <15 mL/min/1 73 square meters)  Note: GFR calculation is accurate only with a steady state creatinine    Lipase [[id number]]  (Normal) Collected: 01/08/23 1741    Lab Status: Final result Specimen: Blood from Arm, Right Updated: 01/08/23 1812     Lipase 73 u/L     CBC and differential [[id number]]  (Abnormal) Collected: 01/08/23 1741    Lab Status: Final result Specimen: Blood from Arm, Right Updated: 01/08/23 1748     WBC 9 73 Thousand/uL      RBC 5 54 Million/uL      Hemoglobin 17 0 g/dL      Hematocrit 51 8 %      MCV 94 fL      MCH 30 7 pg      MCHC 32 8 g/dL      RDW 13 8 %      MPV 10 2 fL      Platelets 378 Thousands/uL      nRBC 0 /100 WBCs      Neutrophils Relative 76 %      Immat GRANS % 0 %      Lymphocytes Relative 16 %      Monocytes Relative 4 %      Eosinophils Relative 4 %      Basophils Relative 0 % Neutrophils Absolute 7 32 Thousands/µL      Immature Grans Absolute 0 02 Thousand/uL      Lymphocytes Absolute 1 57 Thousands/µL      Monocytes Absolute 0 37 Thousand/µL      Eosinophils Absolute 0 43 Thousand/µL      Basophils Absolute 0 02 Thousands/µL     Urine Microscopic [[id number]]  (Abnormal) Collected: 01/08/23 1558    Lab Status: Final result Specimen: Urine, Clean Catch Updated: 01/08/23 1648     RBC, UA None Seen /hpf      WBC, UA 1-2 /hpf      Epithelial Cells Occasional /hpf      Bacteria, UA Moderate /hpf      MUCUS THREADS Occasional    Urine Macroscopic, POC [[id number]]  (Abnormal) Collected: 01/08/23 1558    Lab Status: Final result Specimen: Urine Updated: 01/08/23 1600     Color, UA Orange     Clarity, UA Clear     pH, UA 6 0     Leukocytes, UA Negative     Nitrite, UA Positive     Protein, UA 30 (1+) mg/dl      Glucose, UA Negative mg/dl      Ketones, UA 40 (2+) mg/dl      Urobilinogen, UA 1 0 E U /dl      Bilirubin, UA Small     Occult Blood, UA Trace     Specific Gravity, UA >=1 030    Narrative:      CLINITEK RESULT                 No orders to display              Procedures  Procedures         ED Course  ED Course as of 01/08/23 1901   Bernard Mckinney Jan 08, 2023   1652 Nitrite, UA(!): Positive   1721 Pt reports he vomited again "after getting the meds"   1815 Requesting pain medication   1825 Lipase: 73   1825 Creatinine: 1 00  baseline   1825 No evidence of acute metabolic/elyte abnl  Wlll attempt to PO challenge again  1850 No vomiting, will d/c home w/ med refills  Instructed to f/u w/ GI - call them tomorrow  Instructions supplied in [place] [employer]  Persistent abd pain w/ recurrent n/v/d - w/u at [facility] yesterday unremarkable (labs, CT a/p) -- dc w/ symptomatic meds   Here c/o recurrent vomiting, persistent diarrhea and continued pain with benign exam   Will give ODT zofran and po challenge    Abdominal pain: acute illness or injury     Details: hx of PUD/gastritis, CT a/p negative yesterday - no need for repeat imaging at this time  Diarrhea: acute illness or injury     Details: will d/c w/ bentyl to use prn and instructions on diet  Gastritis: acute illness or injury     Details: has protonix  refilled carafate, instructed to contact GI tomorrow  History of peptic ulcer disease: acute illness or injury     Details: refilled carafate, continue protonix, call GI tomorrow  Nausea and vomiting: acute illness or injury     Details: rx for zofran prn  instructed on clear liquids  UTI (urinary tract infection): acute illness or injury     Details: received IV abx in ED and rx for po antibiotics starting tomorrow  ordered UCx  Amount and/or Complexity of Data Reviewed  External Data Reviewed: labs and radiology  Details: unremarkable CT a/p, labs w/ dehydration/hemoconcentration  Labs: ordered  Decision-making details documented in ED Course  Risk  OTC drugs  Prescription drug management            Disposition  Final diagnoses:   Abdominal pain   Gastritis   Nausea and vomiting   Diarrhea   History of peptic ulcer disease   UTI (urinary tract infection)     Time reflects when diagnosis was documented in both MDM as applicable and the Disposition within this note     Time User Action Codes Description Comment    1/8/2023  6:29 PM Jose Miguel Rodriguezky L Add [R10 13] Epigastric abdominal pain     1/8/2023  6:30 PM Jose Miguel Rodriguezky L Add [R10 9] Abdominal pain     1/8/2023  6:30 PM Jose Miguel Rodriguezky L Add [K29 70] Gastritis     1/8/2023  6:30 PM Jose Miguel Rodriguezky L Add [R11 2] Nausea and vomiting     1/8/2023  6:30 PM Jose Miguel Rodriguezky L Add [R19 7] Diarrhea     1/8/2023  6:30 PM Jose Miguel Rodriguezky L Add [Z87 11] History of peptic ulcer disease     1/8/2023  6:30 PM Jose Miguel Rodriguezky L Modify [R10 9] Abdominal pain     1/8/2023  6:30 PM Jose Miguel Rodriguezky L Remove [R10 13] Epigastric abdominal pain     1/8/2023  6:51 PM Jose Miguel Rodriguezky L Add [N39 0] UTI (urinary tract infection)       ED Disposition     ED Disposition   Discharge    Condition   Stable    Date/Time   Sun Jan 8, 2023  6:51 PM    Comment   Rommy Mac Phalen discharge to home/self care                 Follow-up Information     Follow up With Specialties Details Why Maninder Nicole MD Gastroenterology Call on 1/9/2023 to schedule a follow up appointment for further evaluation and treatment [address] [facility] [address] Julien Jackson MD Family Medicine Schedule an appointment as soon as possible for a visit  As needed [address]  [address] [facility]  [phone]  [phone]  [phone]            Discharge Medication List as of 1/8/2023  6:52 PM      START taking these medications    Details   cephalexin (KEFLEX) 500 mg capsule Take 1 capsule (500 mg total) by mouth every 12 (twelve) hours for 7 days Do not start before January 9, 2023 , Starting Mon 1/9/2023, Until Mon 1/16/2023, Normal      dicyclomine (BENTYL) 20 mg tablet Take 1 tablet (20 mg total) by mouth every 6 (six) hours as needed (diarrhea/crampy abdominal pain), Starting Sun 1/8/2023, Normal      ondansetron (ZOFRAN-ODT) 4 mg disintegrating tablet Take 1 tablet (4 mg total) by mouth every 8 (eight) hours as needed for nausea or vomiting, Starting Sun 1/8/2023, Normal      sucralfate (CARAFATE) 1 g tablet Take 1 tablet (1 g total) by mouth 4 (four) times a day for 14 days, Starting Sun 1/8/2023, Until Sun 1/22/2023, Normal         CONTINUE these medications which have NOT CHANGED    Details   escitalopram (LEXAPRO) 10 mg tablet Take 10 mg by mouth daily, Starting Thu 12/15/2022, Historical Med      pantoprazole (PROTONIX) 40 mg tablet Take 1 tablet (40 mg total) by mouth 2 (two) times a day Half an hour prior to breakfast and dinner, Starting Tue 12/13/2022, Normal         STOP taking these medications       aluminum-magnesium hydroxide-simethicone (MAALOX MAX) 400-400-40 MG/5ML suspension Comments:   Reason for Stopping:         famotidine (PEPCID) 20 mg tablet Comments:   Reason for Stopping:         omeprazole (PriLOSEC) 20 mg delayed release capsule Comments:   Reason for Stopping:         sucralfate (CARAFATE) 1 g/10 mL suspension Comments:   Reason for Stopping:               No discharge procedures on file      PDMP Review     None          ED Provider  Electronically Signed by           Sera Maloney DO  01/08/23 [id number]

## 2023-01-09 ENCOUNTER — TELEPHONE (OUTPATIENT)
Dept: OTHER | Facility: OTHER | Age: 28
End: 2023-01-09

## 2023-01-09 NOTE — TELEPHONE ENCOUNTER
I called pt using  services [phone]    We left vm of results and reminder for office appt in March pt may return our call with questions

## 2023-01-09 NOTE — TELEPHONE ENCOUNTER
Call from patient requesting his EGD results  He also advised he has needed to visit the ER 3 times for his stomach issues/pain and diarrhea and he would like to know what he can do to keep himself out of the hospital  Please return his call

## 2023-01-10 LAB — BACTERIA UR CULT: NORMAL

## 2023-01-10 NOTE — TELEPHONE ENCOUNTER
OV 12/13/22 Moussa  FU 3/14/23 [place]  EGD 12/27/22    H/o  1  PUD  2  Epigastric Pain    Meds  Bentyl 20 mg q 6 hrs PRN  zofran 4 mg q 8 hrs  Sucralfate 1 g tablet 4x/day  Protonix 40 mg BID    Spoke with pt using  #[id number]  Triage of symptoms- pt denies fever, + N/V with severe pain, current pain 7-8/10, constant in entire abdomen  Pt reports having a BM this morning that was loose, muddy and watery  Pt reports having multiple liquid BM's daily  Denies rectal bleeding, + flatus  Pt was in the ED for same symptoms with 10/10 pain 1/7 & 1/8  Pt is tolerating PO fluid and food  Pt taking prescription meds as listed above, pt did not sound as if in distress at the time of this call and he was at work  Recommended 2 scoops Miralax in 8 oz fluid until + BM and then BID  Reviewed prescription medications and advised to continue as prescribed  Reviewed alarming s/s that would require ED evaluation  Reviewed GERD diet and behavior interventions, advised to increase PO hydration  Pt will call back by Friday if symptoms are not improved and agrees to ED eval for alarming s/s  Pt agreeable to recommendations and verbalized understanding of all information via

## 2023-01-10 NOTE — TELEPHONE ENCOUNTER
As the patient is having multiple loose watery bowel movements daily and no signs of constipation on CT scan from 12/8/2022 I do not recommend he start MiraLAX  He should continue his antispasmodics as prescribed as well as carafate, zofran, and pantoprazole  If the carafate is ineffective can trial magic mouthwash

## 2023-02-20 ENCOUNTER — HOSPITAL ENCOUNTER (EMERGENCY)
Facility: HOSPITAL | Age: 28
Discharge: HOME | End: 2023-02-20
Attending: EMERGENCY MEDICINE

## 2023-02-20 VITALS
SYSTOLIC BLOOD PRESSURE: 131 MMHG | WEIGHT: 223.55 LBS | BODY MASS INDEX: 29.49 KG/M2 | RESPIRATION RATE: 17 BRPM | TEMPERATURE: 98.9 F | OXYGEN SATURATION: 100 % | DIASTOLIC BLOOD PRESSURE: 69 MMHG | HEART RATE: 85 BPM

## 2023-02-20 DIAGNOSIS — Z20.822 COVID-19 VIRUS TEST RESULT UNKNOWN: ICD-10-CM

## 2023-02-20 DIAGNOSIS — R50.9 FEVER: Primary | ICD-10-CM

## 2023-02-20 LAB
FLUAV RNA RESP QL NAA+PROBE: NEGATIVE
FLUBV RNA RESP QL NAA+PROBE: NEGATIVE
RSV RNA RESP QL NAA+PROBE: NEGATIVE
SARS-COV-2 RNA RESP QL NAA+PROBE: NEGATIVE

## 2023-02-20 NOTE — Clinical Note
Fernanda Jeronimo was seen and treated in our emergency department on 2/20/2023  Diagnosis:     Fernanda    He may return on this date: 02/21/2023         If you have any questions or concerns, please don't hesitate to call        Mario Whalen DO    ______________________________           _______________          _______________  Hospital Representative                              Date                                Time

## 2023-02-20 NOTE — Clinical Note
Fernanda Jauregui was seen and treated in our emergency department on 2/20/2023  No restrictions            Diagnosis:     Fernanda  may return to work on return date  He may return on this date: 02/23/2023         If you have any questions or concerns, please don't hesitate to call        Elsi Shea, DO    ______________________________           _______________          _______________  Hospital Representative                              Date                                Time

## 2023-02-20 NOTE — Clinical Note
Fernanda Chepe Nova was seen and treated in our emergency department on 2/20/2023  Diagnosis:     Fernanda    He may return on this date: 02/21/2023         If you have any questions or concerns, please don't hesitate to call        Awais Ceballos DO    ______________________________           _______________          _______________  Hospital Representative                              Date                                Time

## 2023-02-20 NOTE — Clinical Note
Fernanda Campa was seen and treated in our emergency department on 2/20/2023  No restrictions            Diagnosis:     Fernanda  may return to work on return date  He may return on this date: 02/23/2023         If you have any questions or concerns, please don't hesitate to call        Matty Lorenzo DO    ______________________________           _______________          _______________  Hospital Representative                              Date                                Time

## 2023-02-21 ENCOUNTER — OFFICE VISIT (OUTPATIENT)
Dept: FAMILY MEDICINE CLINIC | Facility: CLINIC | Age: 28
End: 2023-02-21

## 2023-02-21 VITALS
OXYGEN SATURATION: 98 % | DIASTOLIC BLOOD PRESSURE: 72 MMHG | TEMPERATURE: 98.2 F | SYSTOLIC BLOOD PRESSURE: 110 MMHG | HEART RATE: 84 BPM | BODY MASS INDEX: 29.48 KG/M2 | WEIGHT: 222.4 LBS | RESPIRATION RATE: 20 BRPM | HEIGHT: 73 IN

## 2023-02-21 DIAGNOSIS — I45.10 INCOMPLETE RBBB: ICD-10-CM

## 2023-02-21 DIAGNOSIS — R10.13 EPIGASTRIC ABDOMINAL PAIN: Primary | ICD-10-CM

## 2023-02-21 DIAGNOSIS — K29.70 GASTRITIS WITHOUT BLEEDING, UNSPECIFIED CHRONICITY, UNSPECIFIED GASTRITIS TYPE: ICD-10-CM

## 2023-02-21 DIAGNOSIS — F41.9 ANXIETY: ICD-10-CM

## 2023-02-21 DIAGNOSIS — K58.0 IRRITABLE BOWEL SYNDROME WITH DIARRHEA: ICD-10-CM

## 2023-02-21 PROBLEM — K92.1 BLOOD IN STOOL: Status: RESOLVED | Noted: 2022-04-12 | Resolved: 2023-02-21

## 2023-02-21 PROBLEM — K27.9 PUD (PEPTIC ULCER DISEASE): Status: ACTIVE | Noted: 2023-02-21

## 2023-02-21 RX ORDER — PANTOPRAZOLE SODIUM 40 MG/1
40 TABLET, DELAYED RELEASE ORAL 2 TIMES DAILY
Qty: 60 TABLET | Refills: 1 | Status: SHIPPED | OUTPATIENT
Start: 2023-02-21 | End: 2023-03-05 | Stop reason: SDUPTHER

## 2023-02-21 RX ORDER — ESCITALOPRAM OXALATE 10 MG/1
10 TABLET ORAL DAILY
Qty: 90 TABLET | Refills: 1 | Status: SHIPPED | OUTPATIENT
Start: 2023-02-21

## 2023-02-21 NOTE — PROGRESS NOTES
Name: Arya Sanford      : 1995      MRN: [id number]  Encounter Provider: Delta Foster PA-C  Encounter Date: 2023   Encounter department: [facility]     1  Epigastric abdominal pain  Assessment & Plan:  Normal abdominal ultrasound in 2022  Repeat EGD in 2022 revealed resolving gastritis  Suspect pain due to persistent gastritis symptoms  Continue pantoprazole twice daily until GI appointment  2  Gastritis without bleeding, unspecified chronicity, unspecified gastritis type  Assessment & Plan:  History of gastric and duodenal ulcers present on EGD in 2022  Resolving gastritis per EGD in 2022  Refilled pantoprazole due to persistent epigastric pain until upcoming appointment with GI already scheduled on 3/14/2023  Continue sucralfate as needed  Orders:  -     pantoprazole (PROTONIX) 40 mg tablet; Take 1 tablet (40 mg total) by mouth 2 (two) times a day Half an hour prior to breakfast and dinner    3  Irritable bowel syndrome with diarrhea  Assessment & Plan:  Primarily with diarrhea  Her stools stool scale type VI and VII  Multiple ED visits recently for abdominal pain and diarrhea on ,  and   Suspected gastroenteritis per ED? Normal colonoscopy in 2022  Suspect recurrent episodes of abdominal pain and diarrhea due to IBS and ED visits due to needing note for work to avoid point system? Follow-up with GI already scheduled for 3/14/2023  FMLA forms completed today  Continue dicyclomine as needed  Restart Lexapro due to increased anxiety related to work and diarrhea episodes  Orders:  -     escitalopram (LEXAPRO) 10 mg tablet; Take 1 tablet (10 mg total) by mouth daily    4  Incomplete RBBB  Assessment & Plan:  Stable, asymptomatic   Last seen in 2023 with normal echo done in 2023        5  Anxiety  Assessment & Plan:  Previously on lexapro with improvement but no refills  Will restart today due to increased stress with missing work for diarrhea and abdominal pain  Increased stress with mother of child, currently with new partner and younger child of 2 months  Follow-up as scheduled in 2 weeks for med management  Stable mood on exam            Lisa Michael is a 33 y o  male with a h/o PUD, anxiety, gastritis, IBS, hookah use who presents with epigastric and periumbilical pain as a same-day walk-in patient after recent ED visit on 2/20/2022  He has had these episodes on and off for several years since at least 2018  Most recent episode with diarrhea x 3 days mixed type 6 and 7  Sunday felt hot and sweating  No temperature taken but felt feverish  Monday with vomiting x 2 before eating with nausea  He takes Zofran with some improvement  Colonoscopy in 4/2022 was normal  No recent travel, no antibiotics recently, no alcohol, no different foods  He has stress with mother of his older daughter  He has two kids  3years old with 1 mother and other is 2 months with current partner  Lives with current partner mother of 1 month old  Stress with work due to missing work and points    Monday to Thursday 10 hour shifts  Smoking hookah 3x a week  History was conducted in [place] without the use of   Review of Systems   Constitutional: Positive for appetite change  Negative for fever (resolved) and unexpected weight change  Respiratory: Negative for shortness of breath  Cardiovascular: Negative for chest pain  Gastrointestinal: Positive for abdominal pain (chronic epigastric and recent periumbilical), diarrhea and nausea  Negative for abdominal distention, anal bleeding, blood in stool, constipation and vomiting  Genitourinary: Negative for dysuria, flank pain and frequency  Skin: Negative for rash  Psychiatric/Behavioral: The patient is nervous/anxious          Current Outpatient Medications on File Prior to Visit Medication Sig   • dicyclomine (BENTYL) 20 mg tablet Take 1 tablet (20 mg total) by mouth every 6 (six) hours as needed (diarrhea/crampy abdominal pain)   • ondansetron (ZOFRAN-ODT) 4 mg disintegrating tablet Take 1 tablet (4 mg total) by mouth every 8 (eight) hours as needed for nausea or vomiting   • sucralfate (CARAFATE) 1 g tablet Take 1 tablet (1 g total) by mouth 4 (four) times a day for 14 days   • [DISCONTINUED] bisacodyl (DULCOLAX) 5 mg EC tablet Take 1 tablet (5 mg total) by mouth see administration instructions Take 2 tablets at 12:00 p m  on day prior to colonoscopy  Take 2 tablets at 4:00 p m  on day prior to colonoscopy   • [DISCONTINUED] polyethylene glycol (GLYCOLAX) 17 GM/SCOOP powder Take 238 g by mouth see administration instructions Mix 238 g with 64 oz of clear liquid  Drink half starting at noon on the day prior to colonoscopy  Drink remaining half 6 hours prior to procedure on day of colonoscopy  Objective     /72 (BP Location: Left arm, Patient Position: Sitting, Cuff Size: Standard)   Pulse 84   Temp 98 2 °F (36 8 °C) (Tympanic)   Resp 20   Ht 6' 1" (1 854 m)   Wt 101 kg (222 lb 6 4 oz)   SpO2 98%   BMI 29 34 kg/m²     Physical Exam  Vitals and nursing note reviewed  Constitutional:       Appearance: Normal appearance  HENT:      Head: Normocephalic and atraumatic  Cardiovascular:      Rate and Rhythm: Normal rate and regular rhythm  Pulses: Normal pulses  Heart sounds: Normal heart sounds  Pulmonary:      Effort: Pulmonary effort is normal       Breath sounds: Normal breath sounds  Abdominal:      General: There is no distension  Palpations: Abdomen is soft  Tenderness: There is abdominal tenderness (epigastric)  There is no guarding  Neurological:      Mental Status: He is alert and oriented to person, place, and time  Mental status is at baseline         Will Arthur PA-C

## 2023-02-21 NOTE — LETTER
February 21, 2023     Patient: Ruddy Glez  YOB: 1995  Date of Visit: 2/21/2023      To Whom it May Concern:    Fernanda De Los Santos is under my professional care  Fernanda was seen in my office on 2/21/2023  Fernanda may return to work on 2/22/2023  If you have any questions or concerns, please don't hesitate to call           Sincerely,          Sarahi Vásquez PA-C        CC: No Recipients

## 2023-02-21 NOTE — ED PROVIDER NOTES
HPI: Patient is a 33 y o  male who presents with 1 days of fever, diarrhea and nausea which the patient describes at mild The patient has not had contact with people with similar symptoms  The patient has not taken any medication  Allergies   Allergen Reactions   • Aspirin Rash   • Cyclobenzaprine Rash       Past Medical History:   Diagnosis Date   • COVID    • Duodenitis    • Gastric ulcer    • Gastritis       Past Surgical History:   Procedure Laterality Date   • COLONOSCOPY  2022    normal   • ESOPHAGOGASTRODUODENOSCOPY  08/2018    duodenitis, repeat done 2022     Social History     Tobacco Use   • Smoking status: Some Days     Types: Pipe   • Smokeless tobacco: Never   • Tobacco comments:     Hookah   Vaping Use   • Vaping Use: Never used   Substance Use Topics   • Alcohol use: Yes     Comment: occasionally   • Drug use: No       Nursing notes reviewed  Physical Exam:  ED Triage Vitals [02/20/23 1756]   Temperature Pulse Respirations Blood Pressure SpO2   98 9 °F (37 2 °C) 87 16 126/60 99 %      Temp Source Heart Rate Source Patient Position - Orthostatic VS BP Location FiO2 (%)   Oral Monitor Sitting Right arm --      Pain Score       --           ROS: Positive for subjective fevers, the remainder of a 10 organ system ROS was otherwise unremarkable  General: awake, alert, no acute distress    Head: normocephalic, atraumatic  Patient maintaining airway and secretions  No stridor   No brawniness under tongue  Eyes: no scleral icterus  EOMI  PERRLA  Ears: external ears normal, hearing grossly intact  Nose: external exam grossly normal, positive nasal discharge  Neck: symmetric, No JVD noted, trachea midline  Pulmonary: no respiratory distress, no tachypnea noted  Lungs clear to auscultation bilaterally  Cardiovascular: appears well perfused  Positive S1-S2 regular rate and rhythm  Abdomen: no distention noted    Abdomen soft nontender nondistended bowel sounds x4  Musculoskeletal: no deformities noted, tone normal  Neuro: grossly non-focal   Cranial nerves II through XII grossly intact  Psych: mood and affect appropriate    The patient is stable and has a history and physical exam consistent with a viral illness  COVID19 testing has been performed  I considered the patient's other medical conditions as applicable/noted above in my medical decision making  The patient is stable upon discharge  The plan is for supportive care at home  The patient (and any family present) verbalized understanding of the discharge instructions and warnings that would necessitate return to the Emergency Department  All questions were answered prior to discharge  Medications - No data to display  Final diagnoses:   None     ED Disposition     None      Follow-up Information    None       Patient's Medications   Discharge Prescriptions    No medications on file     No discharge procedures on file      Electronically Signed by       Angie Luna DO  02/20/23 6093

## 2023-02-23 PROBLEM — R16.0 HEPATOMEGALY: Status: ACTIVE | Noted: 2023-02-23

## 2023-02-23 PROBLEM — K29.70 GASTRITIS WITHOUT BLEEDING: Status: ACTIVE | Noted: 2023-02-23

## 2023-02-23 PROBLEM — F41.9 ANXIETY: Status: ACTIVE | Noted: 2023-02-23

## 2023-02-23 NOTE — ASSESSMENT & PLAN NOTE
Previously on lexapro with improvement but no refills  Will restart today due to increased stress with missing work for diarrhea and abdominal pain  Increased stress with mother of child, currently with new partner and younger child of 2 months  Follow-up as scheduled in 3 weeks for med management   Stable mood on exam

## 2023-02-23 NOTE — ASSESSMENT & PLAN NOTE
Primarily with diarrhea  Her stools stool scale type VI and VII  Multiple ED visits recently for abdominal pain and diarrhea on January 7, 8 and February 20  Suspected gastroenteritis per ED? Normal colonoscopy in 5/2022  Suspect recurrent episodes of abdominal pain and diarrhea due to IBS and ED visits due to needing note for work to avoid point system? Follow-up with GI already scheduled for 3/14/2023  FMLA forms completed today  Continue dicyclomine as needed  Restart Lexapro due to increased anxiety related to work and diarrhea episodes

## 2023-02-23 NOTE — ASSESSMENT & PLAN NOTE
History of gastric and duodenal ulcers present on EGD in 5/2022  Resolving gastritis per EGD in 12/2022  Refilled pantoprazole due to persistent epigastric pain until upcoming appointment with GI already scheduled on 3/14/2023  Continue sucralfate as needed

## 2023-02-23 NOTE — ASSESSMENT & PLAN NOTE
Normal abdominal ultrasound in 4/2022  Repeat EGD in 12/2022 revealed resolving gastritis  Suspect pain due to persistent gastritis symptoms  Continue pantoprazole twice daily until GI appointment

## 2023-03-05 ENCOUNTER — HOSPITAL ENCOUNTER (EMERGENCY)
Facility: HOSPITAL | Age: 28
Discharge: HOME | End: 2023-03-05
Attending: EMERGENCY MEDICINE

## 2023-03-05 VITALS
TEMPERATURE: 98 F | OXYGEN SATURATION: 100 % | WEIGHT: 216.71 LBS | SYSTOLIC BLOOD PRESSURE: 121 MMHG | BODY MASS INDEX: 28.59 KG/M2 | RESPIRATION RATE: 16 BRPM | HEART RATE: 71 BPM | DIASTOLIC BLOOD PRESSURE: 72 MMHG

## 2023-03-05 DIAGNOSIS — K29.70 GASTRITIS WITHOUT BLEEDING, UNSPECIFIED CHRONICITY, UNSPECIFIED GASTRITIS TYPE: ICD-10-CM

## 2023-03-05 DIAGNOSIS — R11.10 VOMITING: ICD-10-CM

## 2023-03-05 DIAGNOSIS — K27.9 PUD (PEPTIC ULCER DISEASE): ICD-10-CM

## 2023-03-05 DIAGNOSIS — R19.7 DIARRHEA: ICD-10-CM

## 2023-03-05 DIAGNOSIS — R10.84 GENERALIZED ABDOMINAL PAIN: Primary | ICD-10-CM

## 2023-03-05 DIAGNOSIS — K29.70 GASTRITIS: ICD-10-CM

## 2023-03-05 LAB
ALBUMIN SERPL BCP-MCNC: 4.5 G/DL (ref 3.5–5)
ALP SERPL-CCNC: 56 U/L (ref 34–104)
ALT SERPL W P-5'-P-CCNC: 19 U/L (ref 7–52)
ANION GAP SERPL CALCULATED.3IONS-SCNC: 5 MMOL/L (ref 4–13)
AST SERPL W P-5'-P-CCNC: 19 U/L (ref 13–39)
BASOPHILS # BLD AUTO: 0.03 THOUSANDS/ÂΜL (ref 0–0.1)
BASOPHILS NFR BLD AUTO: 0 % (ref 0–1)
BILIRUB SERPL-MCNC: 0.79 MG/DL (ref 0.2–1)
BUN SERPL-MCNC: 13 MG/DL (ref 5–25)
CALCIUM SERPL-MCNC: 9.8 MG/DL (ref 8.4–10.2)
CHLORIDE SERPL-SCNC: 103 MMOL/L (ref 96–108)
CO2 SERPL-SCNC: 31 MMOL/L (ref 21–32)
CREAT SERPL-MCNC: 1.07 MG/DL (ref 0.6–1.3)
EOSINOPHIL # BLD AUTO: 0.15 THOUSAND/ÂΜL (ref 0–0.61)
EOSINOPHIL NFR BLD AUTO: 1 % (ref 0–6)
ERYTHROCYTE [DISTWIDTH] IN BLOOD BY AUTOMATED COUNT: 12.7 % (ref 11.6–15.1)
GFR SERPL CREATININE-BSD FRML MDRD: 95 ML/MIN/1.73SQ M
GLUCOSE SERPL-MCNC: 90 MG/DL (ref 65–140)
HCT VFR BLD AUTO: 59.1 % (ref 36.5–49.3)
HGB BLD-MCNC: 19.2 G/DL (ref 12–17)
IMM GRANULOCYTES # BLD AUTO: 0.06 THOUSAND/UL (ref 0–0.2)
IMM GRANULOCYTES NFR BLD AUTO: 1 % (ref 0–2)
LIPASE SERPL-CCNC: 16 U/L (ref 11–82)
LYMPHOCYTES # BLD AUTO: 1.89 THOUSANDS/ÂΜL (ref 0.6–4.47)
LYMPHOCYTES NFR BLD AUTO: 15 % (ref 14–44)
MCH RBC QN AUTO: 30.5 PG (ref 26.8–34.3)
MCHC RBC AUTO-ENTMCNC: 32.5 G/DL (ref 31.4–37.4)
MCV RBC AUTO: 94 FL (ref 82–98)
MONOCYTES # BLD AUTO: 0.52 THOUSAND/ÂΜL (ref 0.17–1.22)
MONOCYTES NFR BLD AUTO: 4 % (ref 4–12)
NEUTROPHILS # BLD AUTO: 10.18 THOUSANDS/ÂΜL (ref 1.85–7.62)
NEUTS SEG NFR BLD AUTO: 79 % (ref 43–75)
NRBC BLD AUTO-RTO: 0 /100 WBCS
PLATELET # BLD AUTO: 241 THOUSANDS/UL (ref 149–390)
PMV BLD AUTO: 10.6 FL (ref 8.9–12.7)
POTASSIUM SERPL-SCNC: 3.8 MMOL/L (ref 3.5–5.3)
PROT SERPL-MCNC: 7.2 G/DL (ref 6.4–8.4)
RBC # BLD AUTO: 6.29 MILLION/UL (ref 3.88–5.62)
SODIUM SERPL-SCNC: 139 MMOL/L (ref 135–147)
WBC # BLD AUTO: 12.83 THOUSAND/UL (ref 4.31–10.16)

## 2023-03-05 RX ORDER — KETOROLAC TROMETHAMINE 30 MG/ML
15 INJECTION, SOLUTION INTRAMUSCULAR; INTRAVENOUS ONCE
Status: COMPLETED | OUTPATIENT
Start: 2023-03-05 | End: 2023-03-05

## 2023-03-05 RX ORDER — DICYCLOMINE HCL 20 MG
20 TABLET ORAL EVERY 6 HOURS PRN
Qty: 40 TABLET | Refills: 0 | Status: SHIPPED | OUTPATIENT
Start: 2023-03-05 | End: 2023-03-15

## 2023-03-05 RX ORDER — ONDANSETRON 2 MG/ML
4 INJECTION INTRAMUSCULAR; INTRAVENOUS ONCE
Status: COMPLETED | OUTPATIENT
Start: 2023-03-05 | End: 2023-03-05

## 2023-03-05 RX ORDER — LIDOCAINE HYDROCHLORIDE 20 MG/ML
15 SOLUTION OROPHARYNGEAL ONCE
Status: COMPLETED | OUTPATIENT
Start: 2023-03-05 | End: 2023-03-05

## 2023-03-05 RX ORDER — SUCRALFATE 1 G/1
1 TABLET ORAL 4 TIMES DAILY
Qty: 40 TABLET | Refills: 0 | Status: SHIPPED | OUTPATIENT
Start: 2023-03-05 | End: 2023-03-15

## 2023-03-05 RX ORDER — PANTOPRAZOLE SODIUM 40 MG/1
40 TABLET, DELAYED RELEASE ORAL 2 TIMES DAILY
Qty: 20 TABLET | Refills: 0 | Status: SHIPPED | OUTPATIENT
Start: 2023-03-05 | End: 2023-03-15

## 2023-03-05 RX ORDER — SUCRALFATE 1 G/1
1 TABLET ORAL ONCE
Status: COMPLETED | OUTPATIENT
Start: 2023-03-05 | End: 2023-03-05

## 2023-03-05 RX ADMIN — SODIUM CHLORIDE 1000 ML: 0.9 INJECTION, SOLUTION INTRAVENOUS at 19:40

## 2023-03-05 RX ADMIN — SUCRALFATE 1 G: 1 TABLET ORAL at 19:42

## 2023-03-05 RX ADMIN — KETOROLAC TROMETHAMINE 15 MG: 30 INJECTION, SOLUTION INTRAMUSCULAR; INTRAVENOUS at 19:44

## 2023-03-05 RX ADMIN — LIDOCAINE HYDROCHLORIDE 15 ML: 20 SOLUTION ORAL; TOPICAL at 19:42

## 2023-03-05 RX ADMIN — ONDANSETRON HYDROCHLORIDE 4 MG: 2 SOLUTION INTRAMUSCULAR; INTRAVENOUS at 19:43

## 2023-03-06 NOTE — DISCHARGE INSTRUCTIONS
Please return to the ED for any concerns as outlined in the AVS or for any other concerns  Please follow-up with your primary care provider and gastroenterology in 2 days for re-evaluation and further management  Continue all at home medications as prescribed  Continue to drink a normal amount of clear liquids to ensure adequate hydration

## 2023-03-06 NOTE — ED PROVIDER NOTES
History  Chief Complaint   Patient presents with   • Abdominal Pain     Pt c/o generalized abdominal pain, reports he always has abdominal pain, but this is worse, reports pain also radiating into back  Also c/o vomiting x 3 since waking up       27-y o  M with a PMHx significant for PUD, gastritis, IBS, N, V, and D presenting to the ED with a complaint of N,V,D and abdominal pain  States the pain that he is experiencing is his baseline pain however is more painful today  Reports it has been present over the last week or so but has been manageable  States upon waking today the pain was ok but as the day went on the pain became worse  Has some radiation to the back which is similar to previous abdominal pain exacerbations  Reports he took his at home prescribed bentyl, zofran and protonix without significant relief  States he also has had a few episodes of NBNB emesis which he states significantly increases his pain  No nausea now  (+) Few episodes of nonbloody, nonmelanous diarrhea  Has been urinating normally without dysuria, hematuria, increased urinary frequency or urgency  (+) Decreased PO intake today 2/2 to the pain  Denies any other complaints and reports he has otherwise been well  Specifically denies fever, chills, cough, congestion, sore throat, chest pain, shortness of breath, palpitations, neck pain, headache, rash, confusion, weakness and any other complaint  Denies any previous abdominal surgeries  No known sick contacts, recent antibiotic use, concern for suspicious food intake or recent travel outside the [address] [place] [address] [address]  States occasional alcohol use and occasional hookah smoking  Denies illicit drug use  Prior to Admission Medications   Prescriptions Last Dose Informant Patient Reported?  Taking?   dicyclomine (BENTYL) 20 mg tablet 3/5/2023  No Yes   Sig: Take 1 tablet (20 mg total) by mouth every 6 (six) hours as needed (diarrhea/crampy abdominal pain)   dicyclomine (BENTYL) 20 mg tablet   No Yes   Sig: Take 1 tablet (20 mg total) by mouth every 6 (six) hours as needed (diarrhea/crampy abdominal pain) for up to 10 days   escitalopram (LEXAPRO) 10 mg tablet 3/4/2023  No Yes   Sig: Take 1 tablet (10 mg total) by mouth daily   ondansetron (ZOFRAN-ODT) 4 mg disintegrating tablet 3/5/2023  No Yes   Sig: Take 1 tablet (4 mg total) by mouth every 8 (eight) hours as needed for nausea or vomiting   pantoprazole (PROTONIX) 40 mg tablet 3/5/2023  No Yes   Sig: Take 1 tablet (40 mg total) by mouth 2 (two) times a day Half an hour prior to breakfast and dinner   pantoprazole (PROTONIX) 40 mg tablet   No Yes   Sig: Take 1 tablet (40 mg total) by mouth 2 (two) times a day for 10 days Half an hour prior to breakfast and dinner      Facility-Administered Medications: None       Past Medical History:   Diagnosis Date   • COVID    • Duodenitis    • Gastric ulcer    • Gastritis        Past Surgical History:   Procedure Laterality Date   • COLONOSCOPY  2022    normal   • ESOPHAGOGASTRODUODENOSCOPY  08/2018    duodenitis, repeat done 2022       Family History   Problem Relation Age of Onset   • Hypertension Mother      I have reviewed and agree with the history as documented  E-Cigarette/Vaping   • E-Cigarette Use Never User      E-Cigarette/Vaping Substances   • Nicotine No    • THC No    • CBD No    • Flavoring No    • Other No    • Unknown No      Social History     Tobacco Use   • Smoking status: Some Days     Types: Pipe   • Smokeless tobacco: Never   • Tobacco comments:     Hookah   Vaping Use   • Vaping Use: Never used   Substance Use Topics   • Alcohol use: Yes     Comment: occasionally   • Drug use: No       Review of Systems   Constitutional: Positive for appetite change  Negative for chills and fever  HENT: Negative for congestion, ear pain, rhinorrhea and sore throat  Eyes: Negative for pain and visual disturbance  Respiratory: Negative for cough and shortness of breath      Cardiovascular: Negative for chest pain and palpitations  Gastrointestinal: Positive for abdominal pain, diarrhea, nausea and vomiting  Negative for abdominal distention, anal bleeding, blood in stool and constipation  Genitourinary: Negative for difficulty urinating, dysuria, flank pain, frequency, hematuria, penile pain, testicular pain and urgency  Musculoskeletal: Negative for arthralgias, back pain and neck pain  Skin: Negative for color change and rash  Neurological: Negative for seizures, syncope and headaches  Psychiatric/Behavioral: Negative for confusion  All other systems reviewed and are negative  Physical Exam  Physical Exam  Vitals and nursing note reviewed  Constitutional:       General: He is not in acute distress  Appearance: He is well-developed  He is not ill-appearing or toxic-appearing  HENT:      Head: Normocephalic and atraumatic  Mouth/Throat:      Mouth: Mucous membranes are moist       Pharynx: Oropharynx is clear  No pharyngeal swelling or oropharyngeal exudate  Eyes:      Conjunctiva/sclera: Conjunctivae normal    Cardiovascular:      Rate and Rhythm: Normal rate and regular rhythm  Heart sounds: No murmur heard  Pulmonary:      Effort: Pulmonary effort is normal  No respiratory distress  Breath sounds: Normal breath sounds  Abdominal:      Palpations: Abdomen is soft  Comments: Mild subjective generalized abdominal TTP without specific point tenderness, guarding or rebound  No overlying skin changes  No CVA tenderness b/l  Genitourinary:     Comments: Deferred  Musculoskeletal:         General: No swelling  Cervical back: Neck supple  Comments: No midline or paraspinal TTP over the C/T/L-spine  Lymphadenopathy:      Cervical: No cervical adenopathy  Skin:     General: Skin is warm and dry  Capillary Refill: Capillary refill takes less than 2 seconds  Neurological:      General: No focal deficit present  Mental Status: He is alert  GCS: GCS eye subscore is 4  GCS verbal subscore is 5  GCS motor subscore is 6     Psychiatric:         Mood and Affect: Mood normal          Vital Signs  ED Triage Vitals   Temperature Pulse Respirations Blood Pressure SpO2   03/05/23 1847 03/05/23 1847 03/05/23 1847 03/05/23 1847 03/05/23 1847   98 °F (36 7 °C) 84 18 133/99 100 %      Temp Source Heart Rate Source Patient Position - Orthostatic VS BP Location FiO2 (%)   03/05/23 1847 03/05/23 1847 03/05/23 2111 03/05/23 2111 --   Oral Monitor Sitting Right arm       Pain Score       03/05/23 1847       10 - Worst Possible Pain           Vitals:    03/05/23 1847 03/05/23 2111   BP: 133/99 121/72   Pulse: 84 71   Patient Position - Orthostatic VS:  Sitting         Visual Acuity      ED Medications  Medications   sodium chloride 0 9 % bolus 1,000 mL (0 mL Intravenous Stopped 3/5/23 2110)   ketorolac (TORADOL) injection 15 mg (15 mg Intravenous Given 3/5/23 1944)   Lidocaine Viscous HCl (XYLOCAINE) 2 % mucosal solution 15 mL (15 mL Swish & Swallow Given 3/5/23 1942)   sucralfate (CARAFATE) tablet 1 g (1 g Oral Given 3/5/23 1942)   ondansetron (ZOFRAN) injection 4 mg (4 mg Intravenous Given 3/5/23 1943)       Diagnostic Studies  Results Reviewed     Procedure Component Value Units Date/Time    Comprehensive metabolic panel [[id number]] Collected: 03/05/23 1916    Lab Status: Final result Specimen: Blood from Arm, Right Updated: 03/05/23 1950     Sodium 139 mmol/L      Potassium 3 8 mmol/L      Chloride 103 mmol/L      CO2 31 mmol/L      ANION GAP 5 mmol/L      BUN 13 mg/dL      Creatinine 1 07 mg/dL      Glucose 90 mg/dL      Calcium 9 8 mg/dL      AST 19 U/L      ALT 19 U/L      Alkaline Phosphatase 56 U/L      Total Protein 7 2 g/dL      Albumin 4 5 g/dL      Total Bilirubin 0 79 mg/dL      eGFR 95 ml/min/1 73sq m     Narrative:      [place] guidelines for Chronic Kidney Disease (CKD):   •  Stage 1 with normal or high GFR (GFR > 90 mL/min/1 73 square meters)  •  Stage 2 Mild CKD (GFR = 60-89 mL/min/1 73 square meters)  •  Stage 3A Moderate CKD (GFR = 45-59 mL/min/1 73 square meters)  •  Stage 3B Moderate CKD (GFR = 30-44 mL/min/1 73 square meters)  •  Stage 4 Severe CKD (GFR = 15-29 mL/min/1 73 square meters)  •  Stage 5 End Stage CKD (GFR <15 mL/min/1 73 square meters)  Note: GFR calculation is accurate only with a steady state creatinine    Lipase [[id number]]  (Normal) Collected: 03/05/23 1916    Lab Status: Final result Specimen: Blood from Arm, Right Updated: 03/05/23 1950     Lipase 16 u/L     CBC and differential [[id number]]  (Abnormal) Collected: 03/05/23 1916    Lab Status: Final result Specimen: Blood from Arm, Right Updated: 03/05/23 1926     WBC 12 83 Thousand/uL      RBC 6 29 Million/uL      Hemoglobin 19 2 g/dL      Hematocrit 59 1 %      MCV 94 fL      MCH 30 5 pg      MCHC 32 5 g/dL      RDW 12 7 %      MPV 10 6 fL      Platelets 583 Thousands/uL      nRBC 0 /100 WBCs      Neutrophils Relative 79 %      Immat GRANS % 1 %      Lymphocytes Relative 15 %      Monocytes Relative 4 %      Eosinophils Relative 1 %      Basophils Relative 0 %      Neutrophils Absolute 10 18 Thousands/µL      Immature Grans Absolute 0 06 Thousand/uL      Lymphocytes Absolute 1 89 Thousands/µL      Monocytes Absolute 0 52 Thousand/µL      Eosinophils Absolute 0 15 Thousand/µL      Basophils Absolute 0 03 Thousands/µL                  No orders to display              Procedures  Procedures         ED Course  ED Course as of 03/05/23 2144   Sun Mar 05, 2023   2026 WBC(!): 12 83   2026 Red Blood Cell Count(!): 6 29   2026 Hemoglobin(!): 19 2   2026 HCT(!): 59 1   2026 CBC findings possibly due to hemoconcentration 2/2 dehydration although electrolytes and creatinine WNL  2101 On bedside reevaluation patient resting in the stretcher in no acute distress  Reports significant improvement of his symptoms  Has been able to maintain p o  intake    He is requesting to go home, will cancel UA  States he does need refills of his Protonix, Carafate and Bentyl  Advised patient to keep his upcoming appointment with GI, call tomorrow to see if they can move his appointment sooner  Advised to continue to drink a normal amount of clear liquids to ensure adequate hydration  Advised to follow-up with PCP in 2 days for reevaluation and further management  Strict return precautions verbally communicated to the patient as outlined in the AVS   All patient questions and concerns were answered  Patient verbally communicated their understanding and agreement to the above plan  Patient stable at discharge  Medical Decision Making  44-year-old male presenting to the ED with complaint of abdominal pain, nausea, vomiting, diarrhea for 1 day  Patient does have a history of chronic abdominal pain secondary to PUD and gastritis  States this is baseline pain however worse today  Has had a few episodes of NBNB emesis and nonbloody, nonmelanous diarrhea  Has had decreased p o  intake today 2/2 the abdominal pain and vomiting  No other specific complaints today  Has been following with gastroenterology  Per chart review patient last saw GI in 12/22 and had a repeat EGD and started on Protonix twice daily which he has been taking  States he has a follow-up appointment GI in about 10 days  On exam patient is a well-appearing 44-year-old male resting in the stretcher in no acute distress  VSS  Generalized abdominal tenderness without specific point tenderness, pt does not appear uncomfortable with palpation  No guarding, rebound or CVA tenderness B/L  In light of patient with chronic abdominal pain secondary to PUD and gastritis and reports this feels similar will treat patient symptomatically and will review first nurse ordered CBC, CMP and lipase  Using shared decision making patient was agreeable to this plan    Will reevaluate after labs and symptomatic control  Diarrhea: chronic illness or injury  Gastritis: chronic illness or injury  Generalized abdominal pain: chronic illness or injury  PUD (peptic ulcer disease): chronic illness or injury  Vomiting: chronic illness or injury      Disposition  Final diagnoses:   Generalized abdominal pain   Vomiting   Diarrhea   Gastritis   PUD (peptic ulcer disease)   Gastritis without bleeding, unspecified chronicity, unspecified gastritis type     Time reflects when diagnosis was documented in both MDM as applicable and the Disposition within this note     Time User Action Codes Description Comment    3/5/2023  9:02 PM Ina Setting Add [R10 84] Generalized abdominal pain     3/5/2023  9:02 PM Ina Setting Add [R11 10] Vomiting     3/5/2023  9:02 PM Ina Setting Add [R19 7] Diarrhea     3/5/2023  9:02 PM BrukardtRajat Heman Add [K29 70] Gastritis     3/5/2023  9:02 PM BrukardtRajat Heman Add [K27 9] PUD (peptic ulcer disease)     3/5/2023  9:05 PM BrukardtRajat Heman Add [K29 70] Gastritis without bleeding, unspecified chronicity, unspecified gastritis type       ED Disposition     ED Disposition   Discharge    Condition   Stable    Date/Time   Sun Mar 5, 2023  9:11 PM    Comment   Fernanda Miguel discharge to home/self care                 Follow-up Information     Follow up With Specialties Details Why Contact Info Additional [address] [facility] Emergency Department Emergency Medicine Go to  As needed, If symptoms worsen [facility] [id number]  [address] [place] [address] Emergency Department, [address] , Reji Clifton MD Gastroenterology Call in 1 day For further evaluation [address]   [phone]             Discharge Medication List as of 3/5/2023  9:16 PM      START taking these medications    Details   sucralfate (CARAFATE) 1 g tablet Take 1 tablet (1 g total) by mouth 4 (four) times a day for 10 days, Starting Sun 3/5/2023, Until Wed 3/15/2023, Normal         CONTINUE these medications which have CHANGED    Details   dicyclomine (BENTYL) 20 mg tablet Take 1 tablet (20 mg total) by mouth every 6 (six) hours as needed (diarrhea/crampy abdominal pain) for up to 10 days, Starting Sun 3/5/2023, Until Wed 3/15/2023 at 2359, Normal      pantoprazole (PROTONIX) 40 mg tablet Take 1 tablet (40 mg total) by mouth 2 (two) times a day for 10 days Half an hour prior to breakfast and dinner, Starting Sun 3/5/2023, Until Wed 3/15/2023, Normal         CONTINUE these medications which have NOT CHANGED    Details   escitalopram (LEXAPRO) 10 mg tablet Take 1 tablet (10 mg total) by mouth daily, Starting Tue 2/21/2023, Normal      ondansetron (ZOFRAN-ODT) 4 mg disintegrating tablet Take 1 tablet (4 mg total) by mouth every 8 (eight) hours as needed for nausea or vomiting, Starting Thu 1/26/2023, Normal             No discharge procedures on file      PDMP Review     None          ED Provider  Electronically Signed by           Virgilio Kilgore PA-C  03/05/23 4317

## 2023-03-06 NOTE — ED NOTES
Patient reports dry heaving, diarrhea, reports on going for years, reports he has been seen for this in the emergency department, reports everything is always normal, reports he has been told that he has gastritis, patient reports he has not followed up with GI at this time  Denies blood in stool or vomit       Carlie Elena RN  03/05/23 [id number]

## 2023-03-07 ENCOUNTER — TELEPHONE (OUTPATIENT)
Dept: FAMILY MEDICINE CLINIC | Facility: CLINIC | Age: 28
End: 2023-03-07

## 2023-03-07 NOTE — TELEPHONE ENCOUNTER
[phone]  Piero Green  Por favor, llámame para atrás  He tenido unos cuántos episodios  ¿ He tenido que ir para emergencia  Estoy llamando, marielle quiero hablar con mi doctor, [employer]

## 2023-03-08 ENCOUNTER — OFFICE VISIT (OUTPATIENT)
Dept: FAMILY MEDICINE CLINIC | Facility: CLINIC | Age: 28
End: 2023-03-08

## 2023-03-08 ENCOUNTER — APPOINTMENT (OUTPATIENT)
Dept: LAB | Facility: HOSPITAL | Age: 28
End: 2023-03-08

## 2023-03-08 VITALS
OXYGEN SATURATION: 99 % | RESPIRATION RATE: 20 BRPM | WEIGHT: 218.4 LBS | DIASTOLIC BLOOD PRESSURE: 70 MMHG | HEIGHT: 73 IN | SYSTOLIC BLOOD PRESSURE: 115 MMHG | HEART RATE: 74 BPM | BODY MASS INDEX: 28.94 KG/M2 | TEMPERATURE: 98.5 F

## 2023-03-08 DIAGNOSIS — R63.4 WEIGHT LOSS: ICD-10-CM

## 2023-03-08 DIAGNOSIS — R30.0 DYSURIA: ICD-10-CM

## 2023-03-08 DIAGNOSIS — K58.9 IRRITABLE BOWEL SYNDROME, UNSPECIFIED TYPE: ICD-10-CM

## 2023-03-08 DIAGNOSIS — R10.84 GENERALIZED ABDOMINAL PAIN: Primary | ICD-10-CM

## 2023-03-08 DIAGNOSIS — D75.1 ERYTHROCYTOSIS: ICD-10-CM

## 2023-03-08 DIAGNOSIS — R16.0 HEPATOMEGALY: ICD-10-CM

## 2023-03-08 PROBLEM — K90.41 GLUTEN INTOLERANCE: Status: ACTIVE | Noted: 2023-03-08

## 2023-03-08 LAB
BASOPHILS # BLD AUTO: 0.02 THOUSANDS/ÂΜL (ref 0–0.1)
BASOPHILS NFR BLD AUTO: 0 % (ref 0–1)
BILIRUB UR QL STRIP: NEGATIVE
CLARITY UR: CLEAR
COLOR UR: ABNORMAL
EOSINOPHIL # BLD AUTO: 0.69 THOUSAND/ÂΜL (ref 0–0.61)
EOSINOPHIL NFR BLD AUTO: 11 % (ref 0–6)
ERYTHROCYTE [DISTWIDTH] IN BLOOD BY AUTOMATED COUNT: 12.5 % (ref 11.6–15.1)
FERRITIN SERPL-MCNC: 83 NG/ML (ref 8–388)
GLUCOSE UR STRIP-MCNC: NEGATIVE MG/DL
HCT VFR BLD AUTO: 52.2 % (ref 36.5–49.3)
HGB BLD-MCNC: 16.5 G/DL (ref 12–17)
HGB UR QL STRIP.AUTO: NEGATIVE
IMM GRANULOCYTES # BLD AUTO: 0.01 THOUSAND/UL (ref 0–0.2)
IMM GRANULOCYTES NFR BLD AUTO: 0 % (ref 0–2)
IRON SATN MFR SERPL: 37 % (ref 20–50)
IRON SERPL-MCNC: 124 UG/DL (ref 65–175)
KETONES UR STRIP-MCNC: NEGATIVE MG/DL
LEUKOCYTE ESTERASE UR QL STRIP: NEGATIVE
LYMPHOCYTES # BLD AUTO: 2.59 THOUSANDS/ÂΜL (ref 0.6–4.47)
LYMPHOCYTES NFR BLD AUTO: 40 % (ref 14–44)
MCH RBC QN AUTO: 30.1 PG (ref 26.8–34.3)
MCHC RBC AUTO-ENTMCNC: 31.6 G/DL (ref 31.4–37.4)
MCV RBC AUTO: 95 FL (ref 82–98)
MONOCYTES # BLD AUTO: 0.31 THOUSAND/ÂΜL (ref 0.17–1.22)
MONOCYTES NFR BLD AUTO: 5 % (ref 4–12)
NEUTROPHILS # BLD AUTO: 2.89 THOUSANDS/ÂΜL (ref 1.85–7.62)
NEUTS SEG NFR BLD AUTO: 44 % (ref 43–75)
NITRITE UR QL STRIP: NEGATIVE
NRBC BLD AUTO-RTO: 0 /100 WBCS
PH UR STRIP.AUTO: 7 [PH]
PLATELET # BLD AUTO: 230 THOUSANDS/UL (ref 149–390)
PMV BLD AUTO: 11.5 FL (ref 8.9–12.7)
PROT UR STRIP-MCNC: NEGATIVE MG/DL
RBC # BLD AUTO: 5.48 MILLION/UL (ref 3.88–5.62)
SP GR UR STRIP.AUTO: 1.01 (ref 1–1.04)
TIBC SERPL-MCNC: 332 UG/DL (ref 250–450)
UROBILINOGEN UA: 1 MG/DL
WBC # BLD AUTO: 6.51 THOUSAND/UL (ref 4.31–10.16)

## 2023-03-08 NOTE — ASSESSMENT & PLAN NOTE
Reviewed last abdominal ultrasound in 4/2022  Repeat ultrasound due to worsening nausea and vomiting       Lab Results   Component Value Date    ALT 19 03/05/2023    AST 19 03/05/2023    ALKPHOS 56 03/05/2023

## 2023-03-08 NOTE — ASSESSMENT & PLAN NOTE
33 pound weight loss since December  Suspected due to poor diet intake due to nausea and vomiting and epigastric pain  Consider CT chest abdomen pelvis pending GI consult  Stable weight since last visit  Smokes hookah  No other risk factors for malignancy

## 2023-03-08 NOTE — ASSESSMENT & PLAN NOTE
History of gastric and duodenal ulcers present on EGD in 5/2022 with resolving gastritis per EGD in 12/2022  Improved with pantoprazole 40 mg twice daily and follow-up with GI already scheduled 3/14/2023

## 2023-03-08 NOTE — ASSESSMENT & PLAN NOTE
Primarily with diarrhea  [place] stool scale type VI and VII  Recent ED visit on 3/5/23  With previous multiple ED visits on January 7, 8 and February 20  Normal colonoscopy in 5/2022  FODMAP diet handout given today  Repeat FMLA forms completed today due to needing more time off  Continue avoid gluten due to intolerance  Continue dicyclomine as needed  Restarted Lexapro last visit due to increased anxiety with work and diarrhea episodes  Continue same dose

## 2023-03-08 NOTE — PROGRESS NOTES
Name: Jason Romo      : 1995      MRN: [id number]  Encounter Provider: Amanda Higuera PA-C  Encounter Date: 3/8/2023   Encounter department: [facility]     1  Generalized abdominal pain  Assessment & Plan:  Primarily epigastric, periumbilical, suprapubic  Suspected IBS in the past   Unclear etiology  Multiple ED visits  Follow-up GI on 3/14/2023  Reviewed normal CT abdomen in 2022  Repeat abdominal ultrasound due to hepatomegaly  2  Weight loss  Assessment & Plan:  33 pound weight loss since December  Suspected due to poor diet intake due to nausea and vomiting and epigastric pain  Consider CT chest abdomen pelvis pending GI consult  Stable weight since last visit  Smokes hookah  No other risk factors for malignancy  3  Erythrocytosis  Comments:  Suspected in the ER due to dehydration, will repeat and check iron panel  Orders:  -     CBC and differential; Future  -     Iron Panel (Includes Ferritin, Iron Sat%, Iron, and TIBC); Future    4  Dysuria  Comments:  Check UA and culture, no fevers or chills, no flank pain  Orders:  -     UA w Reflex to Microscopic w Reflex to Culture - Clinic Collect    5  Hepatomegaly  Assessment & Plan:  Reviewed last abdominal ultrasound in 2022  Repeat ultrasound due to worsening nausea and vomiting  Lab Results   Component Value Date    ALT 19 2023    AST 19 2023    ALKPHOS 56 2023         Orders:  -     US abdomen complete; Future; Expected date: 2023    6  Irritable bowel syndrome, unspecified type  Assessment & Plan:  Primarily with diarrhea  [place] stool scale type VI and VII  Recent ED visit on 3/5/23  With previous multiple ED visits on  and   Normal colonoscopy in 2022  FODMAP diet handout given today  Repeat FMLA forms completed today due to needing more time off  Continue avoid gluten due to intolerance  Continue dicyclomine as needed  Restarted Lexapro last visit due to increased anxiety with work and diarrhea episodes  Continue same dose  Lisa Michael is a 33 y o  male with a h/o PUD, anxiety, gastritis, IBS, hookah use who presents with epigastric, umbilical and suprapubic pain with recent worsening after recent ED visit on 3/5/2023  He has had these episodes on and off for several years since at least 2018  The difference since time is that the pain was worse and included pressure/heaviness in the suprapubic region with burning when he peed that went to his back  No current back pain  Diarrhea has resolved  He reports baseline pain that improves with PPI but had nausea and vomiting which prompted him to go to the ER on Sunday  He takes Zofran with some improvement  Colonoscopy in 5/2022 was normal  No recent travel, no antibiotics recently, no alcohol, no different foods  He has stress with mother of his older daughter  Stress with work due to missing work and points    Restarted Lexapro last appointment with no significant improvement  Monday to Thursday 10 hour shifts  Smoking hookah 3x a week  He has upcoming appointment with GI on 3/14/2023  He also reports some pain with pushing to poop  No blood in his stool  History was conducted in [place] without the use of   Review of Systems   Constitutional: Negative for fever and unexpected weight change  HENT: Negative for trouble swallowing and voice change  Respiratory: Negative for shortness of breath  Cardiovascular: Negative for chest pain  Gastrointestinal: Positive for abdominal pain, diarrhea (Resolved), nausea (None currently), rectal pain and vomiting (Resolved)  Negative for abdominal distention, anal bleeding, blood in stool and constipation  Genitourinary: Positive for dysuria   Negative for decreased urine volume, difficulty urinating, flank pain, frequency, hematuria, penile discharge, penile pain, penile swelling, scrotal swelling, testicular pain and urgency  Current Outpatient Medications on File Prior to Visit   Medication Sig   • dicyclomine (BENTYL) 20 mg tablet Take 1 tablet (20 mg total) by mouth every 6 (six) hours as needed (diarrhea/crampy abdominal pain) for up to 10 days   • escitalopram (LEXAPRO) 10 mg tablet Take 1 tablet (10 mg total) by mouth daily   • ondansetron (ZOFRAN-ODT) 4 mg disintegrating tablet Take 1 tablet (4 mg total) by mouth every 8 (eight) hours as needed for nausea or vomiting   • pantoprazole (PROTONIX) 40 mg tablet Take 1 tablet (40 mg total) by mouth 2 (two) times a day for 10 days Half an hour prior to breakfast and dinner   • sucralfate (CARAFATE) 1 g tablet Take 1 tablet (1 g total) by mouth 4 (four) times a day for 10 days   • [DISCONTINUED] bisacodyl (DULCOLAX) 5 mg EC tablet Take 1 tablet (5 mg total) by mouth see administration instructions Take 2 tablets at 12:00 p m  on day prior to colonoscopy  Take 2 tablets at 4:00 p m  on day prior to colonoscopy   • [DISCONTINUED] polyethylene glycol (GLYCOLAX) 17 GM/SCOOP powder Take 238 g by mouth see administration instructions Mix 238 g with 64 oz of clear liquid  Drink half starting at noon on the day prior to colonoscopy  Drink remaining half 6 hours prior to procedure on day of colonoscopy  Objective     /70 (BP Location: Left arm, Patient Position: Sitting, Cuff Size: Standard)   Pulse 74   Temp 98 5 °F (36 9 °C) (Temporal)   Resp 20   Ht 6' 1" (1 854 m)   Wt 99 1 kg (218 lb 6 4 oz)   SpO2 99%   BMI 28 81 kg/m²     Physical Exam  Vitals and nursing note reviewed  Constitutional:       Appearance: Normal appearance  HENT:      Head: Normocephalic and atraumatic  Cardiovascular:      Rate and Rhythm: Normal rate and regular rhythm  Pulses: Normal pulses  Heart sounds: Normal heart sounds     Pulmonary:      Effort: Pulmonary effort is normal  Breath sounds: Normal breath sounds  Abdominal:      Tenderness: There is generalized abdominal tenderness and tenderness in the right upper quadrant, epigastric area, periumbilical area and suprapubic area  Neurological:      Mental Status: He is alert and oriented to person, place, and time  Mental status is at baseline         Lizzy Hemphill PA-C

## 2023-03-08 NOTE — ASSESSMENT & PLAN NOTE
Primarily epigastric, periumbilical, suprapubic  Suspected IBS in the past   Unclear etiology  Multiple ED visits  Follow-up GI on 3/14/2023  Reviewed normal CT abdomen in 12/2022  Repeat abdominal ultrasound due to hepatomegaly

## 2023-03-13 PROBLEM — R89.8 EOSINOPHIL COUNT RAISED: Status: ACTIVE | Noted: 2023-03-13

## 2023-03-14 ENCOUNTER — OFFICE VISIT (OUTPATIENT)
Dept: GASTROENTEROLOGY | Facility: CLINIC | Age: 28
End: 2023-03-14

## 2023-03-14 VITALS
WEIGHT: 217 LBS | DIASTOLIC BLOOD PRESSURE: 70 MMHG | HEIGHT: 73 IN | SYSTOLIC BLOOD PRESSURE: 107 MMHG | TEMPERATURE: 97.7 F | BODY MASS INDEX: 28.76 KG/M2

## 2023-03-14 DIAGNOSIS — R10.13 EPIGASTRIC PAIN: Primary | ICD-10-CM

## 2023-03-14 DIAGNOSIS — R19.4 CHANGE IN BOWEL HABITS: ICD-10-CM

## 2023-03-14 DIAGNOSIS — K62.89 RECTAL PAIN: ICD-10-CM

## 2023-03-14 RX ORDER — PEPPERMINT OIL 90 MG
90 CAPSULE, DELAYED, AND EXTENDED RELEASE ORAL DAILY
Qty: 90 CAPSULE | Refills: 3 | Status: SHIPPED | OUTPATIENT
Start: 2023-03-14

## 2023-03-14 NOTE — PROGRESS NOTES
[facility] - Outpatient Consultation  [facility] Francie Chow [age] y o  male MRN: [id number]  Encounter: [id number]          ASSESSMENT AND PLAN:        1  Epigastric pain  NM gastric emptying    Peppermint Oil (IBgard) 90 MG CPCR      2  Rectal pain        3  Change in bowel habits          Will obtain gastric emptying study to rule out gastroparesis as a cause of his symptomology  Also encouraged him to stop smoking hookah as this can increase acid in his stomach and cause gastritis/peptic ulcer disease  If above work-up is negative, can consider repeat upper endoscopy  Also will have him try peppermint oil/IBgard to see if this helps with his pain/discomfort         ______________________________________________________________    HPI:  Romayne Slaughter is a 33y o  year old male who presents to the office for evaluation of change in bowel habits  Patient was having diarrhea in the past   This had resolved  He underwent colonoscopy in May 2022 which was within normal limits  He does have significant stress at home and at work  Common symptom now is painful defecation and needing to push  He does have rectal pain after bowel movements  Currently, continues to have pain  Pain is very difficult for him to describe  Pain is often combination of sharp, cramping, stabbing pains  This occurs typically after he eats but can be present at all times  This does radiate to the back  He has had upper endoscopy and colonoscopy which were within normal limits  Patient does drink alcohol but minimally/socially  Does smoke hookah  REVIEW OF SYSTEMS:    CONSTITUTIONAL: Denies any fever, chills, rigors, and weight loss  HEENT: No earache or tinnitus  Denies hearing loss or visual disturbances  CARDIOVASCULAR: No chest pain or palpitations  RESPIRATORY: Denies any cough, hemoptysis, shortness of breath or dyspnea on exertion    GASTROINTESTINAL: As noted in the History of Present Illness  GENITOURINARY: No problems with urination  Denies any hematuria or dysuria  NEUROLOGIC: No dizziness or vertigo, denies headaches  MUSCULOSKELETAL: Denies any muscle or joint pain  SKIN: Denies skin rashes or itching  ENDOCRINE: Denies excessive thirst  Denies intolerance to heat or cold  PSYCHOSOCIAL: Denies depression or anxiety  Denies any recent memory loss  Historical Information   Past Medical History:   Diagnosis Date   • COVID    • Duodenitis    • Gastric ulcer    • Gastritis      Past Surgical History:   Procedure Laterality Date   • COLONOSCOPY  2022    normal   • ESOPHAGOGASTRODUODENOSCOPY  08/2018    duodenitis, repeat done 2022     Social History   Social History     Substance and Sexual Activity   Alcohol Use Yes    Comment: occasionally     Social History     Substance and Sexual Activity   Drug Use No     Social History     Tobacco Use   Smoking Status Some Days   • Types: Pipe   Smokeless Tobacco Never   Tobacco Comments    Hookah     Family History   Problem Relation Age of Onset   • Hypertension Mother        Meds/Allergies       Current Outpatient Medications:   •  dicyclomine (BENTYL) 20 mg tablet  •  escitalopram (LEXAPRO) 10 mg tablet  •  ondansetron (ZOFRAN-ODT) 4 mg disintegrating tablet  •  pantoprazole (PROTONIX) 40 mg tablet  •  sucralfate (CARAFATE) 1 g tablet    Allergies   Allergen Reactions   • Aspirin Rash   • Cyclobenzaprine Rash           Objective     Blood pressure 107/70, temperature 97 7 °F (36 5 °C), temperature source Tympanic, height 6' 1" (1 854 m), weight 98 4 kg (217 lb)  Body mass index is 28 63 kg/m²  PHYSICAL EXAM:      General Appearance:   Alert, cooperative, no distress   HEENT:   Normocephalic, atraumatic, anicteric           Lungs:   Equal chest rise and unlabored breathing, normal cough   Heart:   No visualized JVD   Abdomen:   Soft, non-tender, non-distended; no masses, no organomegaly    Genitalia:   Deferred    Rectal: Deferred    Extremities:  No cyanosis, clubbing or edema    Pulses:  Musculoskeletal:  2+ and symmetric  Normal range of motion visualized    Skin:  Neuro:  No jaundice, rashes, or lesions   Alert and appropriate       Lab Results:   No visits with results within 1 Day(s) from this visit  Latest known visit with results is:   Appointment on 03/08/2023   Component Date Value   • WBC 03/08/2023 6 51    • RBC 03/08/2023 5 48    • Hemoglobin 03/08/2023 16 5    • Hematocrit 03/08/2023 52 2 (H)    • MCV 03/08/2023 95    • MCH 03/08/2023 30 1    • MCHC 03/08/2023 31 6    • RDW 03/08/2023 12 5    • MPV 03/08/2023 11 5    • Platelets 63/47/9526 230    • nRBC 03/08/2023 0    • Neutrophils Relative 03/08/2023 44    • Immat GRANS % 03/08/2023 0    • Lymphocytes Relative 03/08/2023 40    • Monocytes Relative 03/08/2023 5    • Eosinophils Relative 03/08/2023 11 (H)    • Basophils Relative 03/08/2023 0    • Neutrophils Absolute 03/08/2023 2 89    • Immature Grans Absolute 03/08/2023 0 01    • Lymphocytes Absolute 03/08/2023 2 59    • Monocytes Absolute 03/08/2023 0 31    • Eosinophils Absolute 03/08/2023 0 69 (H)    • Basophils Absolute 03/08/2023 0 02    • Iron Saturation 03/08/2023 37    • TIBC 03/08/2023 332    • Iron 03/08/2023 124    • Ferritin 03/08/2023 83          Radiology Results:   No results found

## 2023-05-15 ENCOUNTER — OFFICE VISIT (OUTPATIENT)
Dept: FAMILY MEDICINE CLINIC | Facility: CLINIC | Age: 28
End: 2023-05-15

## 2023-05-15 VITALS
BODY MASS INDEX: 28.49 KG/M2 | SYSTOLIC BLOOD PRESSURE: 110 MMHG | OXYGEN SATURATION: 98 % | RESPIRATION RATE: 20 BRPM | HEART RATE: 78 BPM | DIASTOLIC BLOOD PRESSURE: 70 MMHG | WEIGHT: 215 LBS | TEMPERATURE: 98 F | HEIGHT: 73 IN

## 2023-05-15 DIAGNOSIS — K58.0 IRRITABLE BOWEL SYNDROME WITH DIARRHEA: Primary | ICD-10-CM

## 2023-05-15 DIAGNOSIS — R19.4 ENCOUNTER FOR DIAGNOSTIC COLONOSCOPY DUE TO CHANGE IN BOWEL HABITS: ICD-10-CM

## 2023-05-15 RX ORDER — PAROXETINE 10 MG/1
10 TABLET, FILM COATED ORAL DAILY
Qty: 30 TABLET | Refills: 5 | Status: SHIPPED | OUTPATIENT
Start: 2023-05-15

## 2023-05-15 RX ORDER — DICYCLOMINE HCL 20 MG
20 TABLET ORAL 3 TIMES DAILY
Qty: 90 TABLET | Refills: 5 | Status: SHIPPED | OUTPATIENT
Start: 2023-05-15 | End: 2023-05-16 | Stop reason: ALTCHOICE

## 2023-05-15 NOTE — PROGRESS NOTES
Name: Domenico Conteh      : 1995      MRN: [id number]  Encounter Provider: Gosia Parker MD  Encounter Date: 5/15/2023   Encounter department: [facility]   I explained to patient that in patients who have symptoms of irritable bowel syndrome (IBS), the differential diagnosis includes celiac sprue, microscopic and collagenous colitis, atypical Crohn’s disease for patients with diarrhea-predominant IBS, and chronic constipation (witho pain) for those with constipation-predominant IBS  The current symptoms  are been treated in the needed basis explained to Fernanda  Recommended  Paxil since a important anxiety component  and probiotic as previous papers mention important in its treatment  Reassess  Encouraged make appt for US and NM gastric study  1  Irritable bowel syndrome with diarrhea  -     PARoxetine (PAXIL) 10 mg tablet; Take 1 tablet (10 mg total) by mouth daily  -     dicyclomine (BENTYL) 20 mg tablet; Take 1 tablet (20 mg total) by mouth 3 (three) times a day    2  BMI 28 0-28 9,adult    3  Encounter for diagnostic colonoscopy due to change in bowel habits           Subjective      HPI       This is a chronic problem  The current episode started more than 1 year ago  The onset quality is sudden  The problem occurs intermittently  The problem has been waxing and waning  The pain is located in the generalized abdominal region  The pain is at a severity of 5/10  The pain is moderate  The quality of the pain is colicky  Pertinent negatives include no arthralgias, dysuria, fever or headaches  Exacerbated by: stress  The pain is relieved by nothing  He has tried nothing for the symptoms  Prior diagnostic workup includes upper endoscopy  There is no history of abdominal surgery, colon cancer, Crohn's disease, gallstones, GERD, irritable bowel syndrome, pancreatitis, PUD or ulcerative colitis       Review of Systems   Constitutional: "Positive for fatigue  Negative for chills, diaphoresis and fever  HENT: Negative for hearing loss, sinus pressure, sore throat and trouble swallowing  Eyes: Negative for photophobia, pain, redness and visual disturbance  Respiratory: Negative for cough, choking, chest tightness and shortness of breath  Cardiovascular: Negative for chest pain, palpitations and leg swelling  Gastrointestinal: Positive for abdominal pain and diarrhea  Genitourinary: Negative for difficulty urinating, dysuria, enuresis and flank pain  Musculoskeletal: Negative for arthralgias, back pain, gait problem and joint swelling  Neurological: Negative for dizziness, facial asymmetry, light-headedness and headaches  Psychiatric/Behavioral: Negative for agitation, behavioral problems, confusion and decreased concentration  Current Outpatient Medications on File Prior to Visit   Medication Sig   • Peppermint Oil (IBgard) 90 MG CPCR Take 90 mg by mouth in the morning   • sucralfate (CARAFATE) 1 g tablet Take 1 tablet (1 g total) by mouth 4 (four) times a day for 10 days   • [DISCONTINUED] bisacodyl (DULCOLAX) 5 mg EC tablet Take 1 tablet (5 mg total) by mouth see administration instructions Take 2 tablets at 12:00 p m  on day prior to colonoscopy  Take 2 tablets at 4:00 p m  on day prior to colonoscopy   • [DISCONTINUED] polyethylene glycol (GLYCOLAX) 17 GM/SCOOP powder Take 238 g by mouth see administration instructions Mix 238 g with 64 oz of clear liquid  Drink half starting at noon on the day prior to colonoscopy  Drink remaining half 6 hours prior to procedure on day of colonoscopy  Objective     /70 (BP Location: Left arm, Patient Position: Sitting, Cuff Size: Standard)   Pulse 78   Temp 98 °F (36 7 °C) (Tympanic)   Resp 20   Ht 6' 1\" (1 854 m)   Wt 97 5 kg (215 lb)   SpO2 98%   BMI 28 37 kg/m²     Physical Exam  Vitals and nursing note reviewed     Neck:      Thyroid: No thyroid mass or " thyromegaly  Vascular: No carotid bruit or JVD  Trachea: No tracheal tenderness  Cardiovascular:      Rate and Rhythm: Normal rate and regular rhythm  No extrasystoles are present  Pulses: Normal pulses  Heart sounds: Normal heart sounds  Heart sounds not distant  No friction rub  Pulmonary:      Effort: Pulmonary effort is normal  No tachypnea or bradypnea  Breath sounds: Normal breath sounds  No stridor  Abdominal:      General: Bowel sounds are normal  There is no abdominal bruit  Palpations: Abdomen is soft  There is no hepatomegaly or splenomegaly  Tenderness: There is generalized abdominal tenderness  There is guarding  Hernia: No hernia is present  Musculoskeletal:         General: Normal range of motion  Cervical back: No edema or rigidity  Skin:     General: Skin is warm and dry  Neurological:      Mental Status: He is oriented to person, place, and time  Deep Tendon Reflexes: Reflexes are normal and symmetric  Psychiatric:         Behavior: Behavior normal          Thought Content: Thought content normal          Judgment: Judgment normal        Lilibeth Dominguez MD  BMI Counseling: Body mass index is 28 37 kg/m²  The BMI is above normal  Nutrition recommendations include reducing portion sizes  Exercise recommendations include exercising 3-5 times per week

## 2023-05-16 RX ORDER — DICYCLOMINE HCL 20 MG
20 TABLET ORAL 3 TIMES DAILY
Qty: 90 TABLET | Refills: 5 | Status: SHIPPED | OUTPATIENT
Start: 2023-05-16 | End: 2023-06-15

## 2023-05-17 ENCOUNTER — HOSPITAL ENCOUNTER (EMERGENCY)
Facility: HOSPITAL | Age: 28
Discharge: HOME | End: 2023-05-17
Attending: EMERGENCY MEDICINE

## 2023-05-17 VITALS
BODY MASS INDEX: 28.62 KG/M2 | TEMPERATURE: 98.4 F | OXYGEN SATURATION: 99 % | HEART RATE: 85 BPM | WEIGHT: 216.93 LBS | DIASTOLIC BLOOD PRESSURE: 81 MMHG | SYSTOLIC BLOOD PRESSURE: 137 MMHG | RESPIRATION RATE: 16 BRPM

## 2023-05-17 DIAGNOSIS — J30.2 SEASONAL ALLERGIES: Primary | ICD-10-CM

## 2023-05-17 RX ORDER — METHYLPREDNISOLONE 4 MG/1
TABLET ORAL
Qty: 21 TABLET | Refills: 0 | Status: SHIPPED | OUTPATIENT
Start: 2023-05-17

## 2023-05-17 RX ORDER — DIPHENHYDRAMINE HCL 25 MG
25 TABLET ORAL EVERY 6 HOURS
Qty: 20 TABLET | Refills: 0 | Status: SHIPPED | OUTPATIENT
Start: 2023-05-17

## 2023-05-17 RX ORDER — FLUTICASONE PROPIONATE 50 MCG
1 SPRAY, SUSPENSION (ML) NASAL DAILY
Qty: 16 G | Refills: 0 | Status: SHIPPED | OUTPATIENT
Start: 2023-05-17

## 2023-05-17 RX ORDER — CETIRIZINE HYDROCHLORIDE 10 MG/1
10 TABLET ORAL DAILY
Qty: 30 TABLET | Refills: 0 | Status: SHIPPED | OUTPATIENT
Start: 2023-05-17

## 2023-05-17 NOTE — Clinical Note
Fernanda Cain was seen and treated in our emergency department on 5/17/2023  Diagnosis:     Fernanda    He may return on this date: 05/18/2023         If you have any questions or concerns, please don't hesitate to call        Erin Castorena PA-C    ______________________________           _______________          _______________  Hospital Representative                              Date                                Time

## 2023-05-18 NOTE — ED PROVIDER NOTES
History  Chief Complaint   Patient presents with   • Nasal Congestion     Pt reports nasal congestion for 2 days  Reports seasonal allergies     Rommy Lamont Hamman is a 33 y o  male with a past medical history of seasonal allergies presenting to the ER complaining of worsening of his seasonal allergies over the past 2 days  Patient reports that every year around this time has seasonal allergies flare and are usually alleviated with over-the-counter medications  Patient reports congestion, bilateral eye itching, watery discharge from eyes, and sinus pressure  He reports that he has been attempting treatment with over-the-counter antihistamine but cannot remember the name  He denies any difficulty swallowing, throat swelling, pain with eye movement, rashes, chest pain, shortness of breath, cough, or any sick contacts  Patient reports that last year he was seen in the ER and was given medications and eyedrops which significantly helped his symptoms  Prior to Admission Medications   Prescriptions Last Dose Informant Patient Reported? Taking?    PARoxetine (PAXIL) 10 mg tablet   No No   Sig: Take 1 tablet (10 mg total) by mouth daily   Peppermint Oil (IBgard) 90 MG CPCR   No No   Sig: Take 90 mg by mouth in the morning   dicyclomine (BENTYL) 20 mg tablet   No No   Sig: Take 1 tablet (20 mg total) by mouth 3 (three) times a day   sucralfate (CARAFATE) 1 g tablet   No No   Sig: Take 1 tablet (1 g total) by mouth 4 (four) times a day for 10 days      Facility-Administered Medications: None       Past Medical History:   Diagnosis Date   • COVID    • Duodenitis    • Gastric ulcer    • Gastritis        Past Surgical History:   Procedure Laterality Date   • COLONOSCOPY  2022    normal   • ESOPHAGOGASTRODUODENOSCOPY  08/2018    duodenitis, repeat done 2022       Family History   Problem Relation Age of Onset   • Hypertension Mother      I have reviewed and agree with the history as documented  E-Cigarette/Vaping   • E-Cigarette Use Current Some Day User    • Comments [place]      E-Cigarette/Vaping Substances   • Nicotine No    • THC No    • CBD No    • Flavoring No    • Other No    • Unknown No      Social History     Tobacco Use   • Smoking status: Some Days     Types: Pipe   • Smokeless tobacco: Never   • Tobacco comments:     Hookah   Vaping Use   • Vaping Use: Some days   Substance Use Topics   • Alcohol use: Yes     Comment: occasionally   • Drug use: No       Review of Systems   Constitutional: Negative for chills and fever  HENT: Positive for congestion and sinus pressure  Negative for ear pain and sore throat  Eyes: Positive for discharge and itching  Negative for pain and visual disturbance  Respiratory: Negative for cough and shortness of breath  Cardiovascular: Negative for chest pain and palpitations  Gastrointestinal: Negative for abdominal pain and vomiting  Genitourinary: Negative for dysuria and hematuria  Musculoskeletal: Negative for arthralgias and back pain  Skin: Negative for color change and rash  Neurological: Negative for seizures and syncope  All other systems reviewed and are negative  Physical Exam  Physical Exam  Vitals and nursing note reviewed  Constitutional:       General: He is not in acute distress  Appearance: He is well-developed  He is not ill-appearing, toxic-appearing or diaphoretic  HENT:      Head: Normocephalic and atraumatic  No raccoon eyes, right periorbital erythema or left periorbital erythema  Right Ear: Tympanic membrane normal       Left Ear: Tympanic membrane normal       Nose: Congestion and rhinorrhea present  No nasal deformity  Mouth/Throat:      Mouth: Mucous membranes are moist       Pharynx: No oropharyngeal exudate or posterior oropharyngeal erythema  Eyes:      General:         Right eye: Discharge (Clear, watery) present  Left eye: Discharge (Clear, watery) present  Extraocular Movements: Extraocular movements intact  Right eye: Normal extraocular motion and no nystagmus  Left eye: Normal extraocular motion and no nystagmus  Conjunctiva/sclera:      Right eye: Right conjunctiva is not injected  Left eye: Left conjunctiva is not injected  Pupils: Pupils are equal, round, and reactive to light  Cardiovascular:      Rate and Rhythm: Normal rate and regular rhythm  Heart sounds: No murmur heard  Pulmonary:      Effort: Pulmonary effort is normal  No respiratory distress  Breath sounds: Normal breath sounds  No stridor  No wheezing, rhonchi or rales  Abdominal:      General: There is no distension  Palpations: Abdomen is soft  There is no mass  Tenderness: There is no abdominal tenderness  Musculoskeletal:         General: No swelling  Cervical back: Neck supple  Skin:     General: Skin is warm and dry  Capillary Refill: Capillary refill takes less than 2 seconds  Neurological:      Mental Status: He is alert  Psychiatric:         Mood and Affect: Mood normal          Vital Signs  ED Triage Vitals [05/17/23 1913]   Temperature Pulse Respirations Blood Pressure SpO2   98 4 °F (36 9 °C) 85 16 137/81 99 %      Temp Source Heart Rate Source Patient Position - Orthostatic VS BP Location FiO2 (%)   Oral Monitor -- -- --      Pain Score       No Pain           Vitals:    05/17/23 1913   BP: 137/81   Pulse: 85         Visual Acuity      ED Medications  Medications - No data to display    Diagnostic Studies  Results Reviewed     None                 No orders to display              Procedures  Procedures         ED Course                               SBIRT 20yo+    Flowsheet Row Most Recent Value   Initial Alcohol Screen: US AUDIT-C     1  How often do you have a drink containing alcohol? 0 Filed at: 05/17/2023 1953   2  How many drinks containing alcohol do you have on a typical day you are drinking?   0 Filed at: 05/17/2023 1953   3a  Male UNDER 65: How often do you have five or more drinks on one occasion? 0 Filed at: 05/17/2023 1953   3b  FEMALE Any Age, or MALE 65+: How often do you have 4 or more drinks on one occassion? 0 Filed at: 05/17/2023 1953   Audit-C Score 0 Filed at: 05/17/2023 1953   OSMAR: How many times in the past year have you    Used an illegal drug or used a prescription medication for non-medical reasons? Never Filed at: 05/17/2023 Calvin is a 33 y o  male with a past medical history of seasonal allergies presenting to the ER complaining of worsening of his seasonal allergies over the past 2 days  Reports nasal congestion, bilateral eye itching, bilateral watery discharge from eyes, and sinus pressure  On exam patient is well-appearing in no acute distress  Vital signs are within normal limits  Physical examination reveals watery clear discharge from bilateral eyes, nasal congestion, and rhinorrhea  There is no periorbital erythema or conjunctival injection  No pain with eye movement  No pharyngeal erythema or exudates  Suspect seasonal allergy exacerbation  Extensively discussed supportive care with patient at home  Sent prescription for various antihistamines, Medrol Dosepak, and Flonase  Extensively discussed with patient appropriate use of these medications and appropriate dosing of antihistamines  Advise close follow-up with PCP for reevaluation and advised strict return precautions to the ER  Patient is understanding and agreeable with plan  Seasonal allergies: acute illness or injury  Risk  OTC drugs  Prescription drug management            Disposition  Final diagnoses:   Seasonal allergies     Time reflects when diagnosis was documented in both MDM as applicable and the Disposition within this note     Time User Action Codes Description Comment    5/17/2023  7:34 PM Kristopher Mario Add [J30 2] Seasonal allergies ED Disposition     ED Disposition   Discharge    Condition   Stable    Date/Time   Wed May 17, 2023  7:33 PM    Comment   Fernanda Mary Codding discharge to home/self care                 Follow-up Information     Follow up With Specialties Details Why Contact Info Additional Information    Mahi Nance MD Family Medicine   [address]  [address] [place] [address] [place]   [address]  [id number]  [address] [facility] Department Emergency Medicine  If symptoms worsen [facility] [id number]  [address] [place] [address] Emergency Department, [address] [place], [place], [address]          Discharge Medication List as of 5/17/2023  7:38 PM      START taking these medications    Details   cetirizine (ZyrTEC) 10 mg tablet Take 1 tablet (10 mg total) by mouth daily, Starting Wed 5/17/2023, Normal      diphenhydrAMINE (BENADRYL) 25 mg tablet Take 1 tablet (25 mg total) by mouth every 6 (six) hours, Starting Wed 5/17/2023, Normal      fluticasone (FLONASE) 50 mcg/act nasal spray 1 spray into each nostril daily, Starting Wed 5/17/2023, Normal      methylPREDNISolone 4 MG tablet therapy pack Use as directed on package, Normal      naphazoline-pheniramine (NAPHCON-A) 0 025-0 3 % ophthalmic solution Apply 1 drop to eye 2 (two) times a day, Starting Wed 5/17/2023, Normal         CONTINUE these medications which have NOT CHANGED    Details   dicyclomine (BENTYL) 20 mg tablet Take 1 tablet (20 mg total) by mouth 3 (three) times a day, Starting Tue 5/16/2023, Until Thu 6/15/2023, Normal      PARoxetine (PAXIL) 10 mg tablet Take 1 tablet (10 mg total) by mouth daily, Starting Mon 5/15/2023, Normal      Peppermint Oil (IBgard) 90 MG CPCR Take 90 mg by mouth in the morning, Starting Tue 3/14/2023, Normal      sucralfate (CARAFATE) 1 g tablet Take 1 tablet (1 g total) by mouth 4 (four) times a day for 10 days, Starting Sun 3/5/2023, Until Wed 3/15/2023, Normal             No discharge procedures on file      PDMP Review     None          ED Provider  Electronically Signed by           Sherry Batista PA-C  05/17/23 0833

## 2024-08-06 ENCOUNTER — TELEPHONE (OUTPATIENT)
Dept: FAMILY MEDICINE CLINIC | Facility: CLINIC | Age: 29
End: 2024-08-06

## 2025-01-08 ENCOUNTER — HOSPITAL ENCOUNTER (EMERGENCY)
Facility: HOSPITAL | Age: 30
Discharge: HOME | End: 2025-01-08
Attending: EMERGENCY MEDICINE

## 2025-01-08 VITALS
DIASTOLIC BLOOD PRESSURE: 70 MMHG | OXYGEN SATURATION: 98 % | WEIGHT: 233.25 LBS | BODY MASS INDEX: 30.77 KG/M2 | RESPIRATION RATE: 16 BRPM | TEMPERATURE: 97.6 F | HEART RATE: 95 BPM | SYSTOLIC BLOOD PRESSURE: 149 MMHG

## 2025-01-08 DIAGNOSIS — J11.1 INFLUENZA-LIKE ILLNESS: Primary | ICD-10-CM

## 2025-01-08 LAB
FLUAV AG UPPER RESP QL IA.RAPID: NEGATIVE
FLUBV AG UPPER RESP QL IA.RAPID: NEGATIVE
SARS-COV+SARS-COV-2 AG RESP QL IA.RAPID: NEGATIVE

## 2025-01-08 PROCEDURE — 87811 SARS-COV-2 COVID19 W/OPTIC: CPT | Performed by: EMERGENCY MEDICINE

## 2025-01-08 PROCEDURE — 99283 EMERGENCY DEPT VISIT LOW MDM: CPT

## 2025-01-08 PROCEDURE — 87804 INFLUENZA ASSAY W/OPTIC: CPT | Performed by: EMERGENCY MEDICINE

## 2025-01-08 PROCEDURE — 99284 EMERGENCY DEPT VISIT MOD MDM: CPT | Performed by: EMERGENCY MEDICINE

## 2025-01-08 NOTE — Clinical Note
Fernanda Davis was seen and treated in our emergency department on 1/8/2025.    No restrictions            Diagnosis:     Fernanda  may return to work on return date.    He may return on this date: 01/10/2025         If you have any questions or concerns, please don't hesitate to call.      Tomi Carreon MD    ______________________________           _______________          _______________  Hospital Representative                              Date                                Time

## 2025-01-08 NOTE — ED PROVIDER NOTES
Time reflects when diagnosis was documented in both MDM as applicable and the Disposition within this note       Time User Action Codes Description Comment    1/8/2025  5:13 PM Tomi Carreon Add [J11.1] Influenza-like illness           ED Disposition       ED Disposition   Discharge    Condition   Stable    Date/Time   Wed Jan 8, 2025  5:13 PM    Comment   Fernanda Samm Susan discharge to home/self care.                   Assessment & Plan       Medical Decision Making  Influenza-like illness with a benign exam.  No history exam findings suggest bacterial infection and antibiotics are not indicated will reassure, cough, treat symptoms.    Amount and/or Complexity of Data Reviewed  Labs: ordered.             Medications - No data to display    ED Risk Strat Scores                                              History of Present Illness       Chief Complaint   Patient presents with    Flu Symptoms     Body aches, watery eyes, subjective fevers, runny nose       Past Medical History:   Diagnosis Date    COVID     Duodenitis     Gastric ulcer     Gastritis       Past Surgical History:   Procedure Laterality Date    COLONOSCOPY  2022    normal    ESOPHAGOGASTRODUODENOSCOPY  08/2018    duodenitis, repeat done 2022      Family History   Problem Relation Age of Onset    Hypertension Mother       Social History     Tobacco Use    Smoking status: Some Days     Types: Pipe    Smokeless tobacco: Never    Tobacco comments:     Hookah   Vaping Use    Vaping status: Some Days   Substance Use Topics    Alcohol use: Yes     Comment: occasionally    Drug use: No      E-Cigarette/Vaping    E-Cigarette Use Current Some Day User     Comments [place]       E-Cigarette/Vaping Substances    Nicotine No     THC No     CBD No     Flavoring No     Other No     Unknown No       I have reviewed and agree with the history as documented.       Flu Symptoms      Review of Systems        Objective       ED Triage Vitals   Temperature Pulse Blood  Pressure Respirations SpO2 Patient Position - Orthostatic VS   01/08/25 1656 01/08/25 1656 01/08/25 1658 01/08/25 1656 01/08/25 1656 01/08/25 1656   97.6 °F (36.4 °C) 95 149/70 16 98 % Sitting      Temp Source Heart Rate Source BP Location FiO2 (%) Pain Score    01/08/25 1656 01/08/25 1656 01/08/25 1656 -- --    Oral Monitor Right arm        Vitals      Date and Time Temp Pulse SpO2 Resp BP Pain Score FACES Pain Rating User   01/08/25 1658 -- -- -- -- 149/70 -- -- AW   01/08/25 1656 97.6 °F (36.4 °C) 95 98 % 16 -- -- -- AW            Physical Exam  Constitutional:       Appearance: He is well-developed.   HENT:      Head: Normocephalic and atraumatic.      Nose: Congestion and rhinorrhea present.      Comments: Nttp over sinuses       Mouth/Throat:      Pharynx: Posterior oropharyngeal erythema present. No oropharyngeal exudate.   Eyes:      General: No scleral icterus.     Conjunctiva/sclera: Conjunctivae normal.   Neck:      Vascular: No JVD.      Trachea: No tracheal deviation.   Cardiovascular:      Rate and Rhythm: Normal rate and regular rhythm.   Pulmonary:      Effort: Pulmonary effort is normal. No respiratory distress.      Breath sounds: Normal breath sounds.   Abdominal:      General: There is no distension.      Palpations: There is no mass.      Tenderness: There is no abdominal tenderness.   Musculoskeletal:         General: No deformity. Normal range of motion.      Cervical back: Normal range of motion and neck supple. No rigidity or tenderness.   Lymphadenopathy:      Cervical: Cervical adenopathy present.   Skin:     Coloration: Skin is not pale.      Findings: No erythema or rash.   Neurological:      Mental Status: He is alert and oriented to person, place, and time.   Psychiatric:         Behavior: Behavior normal.         Results Reviewed       Procedure Component Value Units Date/Time    FLU/COVID Rapid Antigen (30 min. TAT) - Preferred screening test in ED [[id number]]     Lab Status: No  result Specimen: Nares from Nose             No orders to display       Procedures    ED Medication and Procedure Management   Prior to Admission Medications   Prescriptions Last Dose Informant Patient Reported? Taking?   PARoxetine (PAXIL) 10 mg tablet   No No   Sig: Take 1 tablet (10 mg total) by mouth daily   Peppermint Oil (IBgard) 90 MG CPCR   No No   Sig: Take 90 mg by mouth in the morning   cetirizine (ZyrTEC) 10 mg tablet   No No   Sig: Take 1 tablet (10 mg total) by mouth daily   dicyclomine (BENTYL) 20 mg tablet   No No   Sig: Take 1 tablet (20 mg total) by mouth 3 (three) times a day   diphenhydrAMINE (BENADRYL) 25 mg tablet   No No   Sig: Take 1 tablet (25 mg total) by mouth every 6 (six) hours   fluticasone (FLONASE) 50 mcg/act nasal spray   No No   Si spray into each nostril daily   methylPREDNISolone 4 MG tablet therapy pack   No No   Sig: Use as directed on package   naphazoline-pheniramine (NAPHCON-A) 0.025-0.3 % ophthalmic solution   No No   Sig: Apply 1 drop to eye 2 (two) times a day      Facility-Administered Medications: None     Current Discharge Medication List        CONTINUE these medications which have NOT CHANGED    Details   cetirizine (ZyrTEC) 10 mg tablet Take 1 tablet (10 mg total) by mouth daily  Qty: 30 tablet, Refills: 0    Associated Diagnoses: Seasonal allergies      dicyclomine (BENTYL) 20 mg tablet Take 1 tablet (20 mg total) by mouth 3 (three) times a day  Qty: 90 tablet, Refills: 5    Associated Diagnoses: Irritable bowel syndrome with diarrhea      diphenhydrAMINE (BENADRYL) 25 mg tablet Take 1 tablet (25 mg total) by mouth every 6 (six) hours  Qty: 20 tablet, Refills: 0    Associated Diagnoses: Seasonal allergies      fluticasone (FLONASE) 50 mcg/act nasal spray 1 spray into each nostril daily  Qty: 16 g, Refills: 0    Associated Diagnoses: Seasonal allergies      methylPREDNISolone 4 MG tablet therapy pack Use as directed on package  Qty: 21 tablet, Refills: 0     Associated Diagnoses: Seasonal allergies      naphazoline-pheniramine (NAPHCON-A) 0.025-0.3 % ophthalmic solution Apply 1 drop to eye 2 (two) times a day  Qty: 15 mL, Refills: 0    Associated Diagnoses: Seasonal allergies      PARoxetine (PAXIL) 10 mg tablet Take 1 tablet (10 mg total) by mouth daily  Qty: 30 tablet, Refills: 5    Associated Diagnoses: Irritable bowel syndrome with diarrhea      Peppermint Oil (IBgard) 90 MG CPCR Take 90 mg by mouth in the morning  Qty: 90 capsule, Refills: 3    Associated Diagnoses: Epigastric pain           No discharge procedures on file.  ED SEPSIS DOCUMENTATION   Time reflects when diagnosis was documented in both MDM as applicable and the Disposition within this note       Time User Action Codes Description Comment    1/8/2025  5:13 PM Tomi Carreon Add [J11.1] Influenza-like illness                  Tomi Carreon MD  01/08/25 4126

## 2025-01-09 ENCOUNTER — APPOINTMENT (OUTPATIENT)
Dept: URGENT CARE | Facility: MEDICAL CENTER | Age: 30
End: 2025-01-09

## 2025-01-09 RX ORDER — MECLIZINE HYDROCHLORIDE 25 MG/1
25 TABLET ORAL 3 TIMES DAILY PRN
COMMUNITY
Start: 2024-07-21 | End: 2025-07-21

## 2025-01-09 RX ORDER — PANTOPRAZOLE SODIUM 20 MG/1
20 TABLET, DELAYED RELEASE ORAL DAILY
COMMUNITY
Start: 2024-10-08